# Patient Record
Sex: FEMALE | Race: WHITE | Employment: UNEMPLOYED | RURAL
[De-identification: names, ages, dates, MRNs, and addresses within clinical notes are randomized per-mention and may not be internally consistent; named-entity substitution may affect disease eponyms.]

---

## 2021-08-04 ENCOUNTER — APPOINTMENT (OUTPATIENT)
Dept: CT IMAGING | Age: 31
End: 2021-08-04
Attending: EMERGENCY MEDICINE
Payer: COMMERCIAL

## 2021-08-04 ENCOUNTER — HOSPITAL ENCOUNTER (EMERGENCY)
Age: 31
Discharge: HOME | End: 2021-08-04
Attending: EMERGENCY MEDICINE
Payer: COMMERCIAL

## 2021-08-04 VITALS
BODY MASS INDEX: 48.15 KG/M2 | WEIGHT: 255 LBS | RESPIRATION RATE: 18 BRPM | SYSTOLIC BLOOD PRESSURE: 134 MMHG | HEIGHT: 61 IN | HEART RATE: 100 BPM | OXYGEN SATURATION: 99 % | DIASTOLIC BLOOD PRESSURE: 87 MMHG | TEMPERATURE: 99.4 F

## 2021-08-04 DIAGNOSIS — K62.5 RECTAL BLEEDING: Primary | ICD-10-CM

## 2021-08-04 LAB
ALBUMIN SERPL-MCNC: 3.5 G/DL (ref 3.5–5)
ALBUMIN/GLOB SERPL: 0.9 {RATIO} (ref 1.1–2.2)
ALP SERPL-CCNC: 115 U/L (ref 45–117)
ALT SERPL-CCNC: 43 U/L (ref 12–78)
ANION GAP SERPL CALC-SCNC: 11 MMOL/L (ref 5–15)
AST SERPL-CCNC: 23 U/L (ref 15–37)
BASOPHILS # BLD: 0 K/UL (ref 0–0.1)
BASOPHILS NFR BLD: 0 % (ref 0–1)
BILIRUB SERPL-MCNC: 0.3 MG/DL (ref 0.2–1)
BUN SERPL-MCNC: 10 MG/DL (ref 6–20)
BUN/CREAT SERPL: 11 (ref 12–20)
CALCIUM SERPL-MCNC: 9.1 MG/DL (ref 8.5–10.1)
CHLORIDE SERPL-SCNC: 103 MMOL/L (ref 97–108)
CO2 SERPL-SCNC: 26 MMOL/L (ref 21–32)
COMMENT, HOLDF: NORMAL
CREAT SERPL-MCNC: 0.92 MG/DL (ref 0.55–1.02)
DIFFERENTIAL METHOD BLD: ABNORMAL
EOSINOPHIL # BLD: 0.2 K/UL (ref 0–0.4)
EOSINOPHIL NFR BLD: 2 % (ref 0–7)
ERYTHROCYTE [DISTWIDTH] IN BLOOD BY AUTOMATED COUNT: 17.1 % (ref 11.5–14.5)
GLOBULIN SER CALC-MCNC: 3.9 G/DL (ref 2–4)
GLUCOSE SERPL-MCNC: 134 MG/DL (ref 65–100)
HCT VFR BLD AUTO: 38.5 % (ref 35–47)
HEMOCCULT STL QL: POSITIVE
HGB BLD-MCNC: 12.4 G/DL (ref 11.5–16)
IMM GRANULOCYTES # BLD AUTO: 0 K/UL (ref 0–0.04)
IMM GRANULOCYTES NFR BLD AUTO: 0 % (ref 0–0.5)
LYMPHOCYTES # BLD: 1.6 K/UL (ref 0.8–3.5)
LYMPHOCYTES NFR BLD: 17 % (ref 12–49)
MCH RBC QN AUTO: 26.9 PG (ref 26–34)
MCHC RBC AUTO-ENTMCNC: 32.2 G/DL (ref 30–36.5)
MCV RBC AUTO: 83.5 FL (ref 80–99)
MONOCYTES # BLD: 0.7 K/UL (ref 0–1)
MONOCYTES NFR BLD: 7 % (ref 5–13)
NEUTS SEG # BLD: 7.2 K/UL (ref 1.8–8)
NEUTS SEG NFR BLD: 74 % (ref 32–75)
NRBC # BLD: 0 K/UL (ref 0–0.01)
NRBC BLD-RTO: 0 PER 100 WBC
PLATELET # BLD AUTO: 249 K/UL (ref 150–400)
PMV BLD AUTO: 9.3 FL (ref 8.9–12.9)
POTASSIUM SERPL-SCNC: 3.8 MMOL/L (ref 3.5–5.1)
PROT SERPL-MCNC: 7.4 G/DL (ref 6.4–8.2)
RBC # BLD AUTO: 4.61 M/UL (ref 3.8–5.2)
SAMPLES BEING HELD,HOLD: NORMAL
SODIUM SERPL-SCNC: 140 MMOL/L (ref 136–145)
WBC # BLD AUTO: 9.7 K/UL (ref 3.6–11)

## 2021-08-04 PROCEDURE — 74176 CT ABD & PELVIS W/O CONTRAST: CPT

## 2021-08-04 PROCEDURE — 80053 COMPREHEN METABOLIC PANEL: CPT

## 2021-08-04 PROCEDURE — 99282 EMERGENCY DEPT VISIT SF MDM: CPT

## 2021-08-04 PROCEDURE — 85025 COMPLETE CBC W/AUTO DIFF WBC: CPT

## 2021-08-04 PROCEDURE — 82272 OCCULT BLD FECES 1-3 TESTS: CPT

## 2021-08-04 PROCEDURE — 36415 COLL VENOUS BLD VENIPUNCTURE: CPT

## 2021-08-04 RX ORDER — DOCUSATE SODIUM 100 MG/1
100 CAPSULE, LIQUID FILLED ORAL 2 TIMES DAILY
Qty: 60 CAPSULE | Refills: 2 | Status: SHIPPED | OUTPATIENT
Start: 2021-08-04 | End: 2021-11-02

## 2021-08-04 NOTE — ED TRIAGE NOTES
Patient presents to the ED with a complaint of rectal bleeding that is intermittent. Per the patient she started off with bright red blood now it's dark. Complains of abd pain one week ago, none now.

## 2021-08-04 NOTE — ED PROVIDER NOTES
EMERGENCY DEPARTMENT HISTORY AND PHYSICAL EXAM      Date: 8/4/2021  Patient Name: Sara Carlton    History of Presenting Illness     Chief Complaint   Patient presents with    Rectal Bleeding       History Provided By: Patient    HPI: Sara Carlton, 26 y.o. female with PMHx significant for ADHD, presents ambulatory to the ED with cc of rectal bleeding. Patient reports last week on Tuesday she had a bowel movement with some bright red blood in the toilet bowl afterwards. Directly before this bowel movement she had a brief episode of upper abdominal pain that only lasted a few seconds. It was an isolated event. She had no further bleeding. Last evening and again this morning she had 2 more bowel movements with bright red blood in the toilet although it was slightly darker than before. She denies any abdominal pain. She denies any nausea, vomiting. She denies any fevers or chills. She denies any family history of Crohn's or ulcerative colitis. She recently delivered a healthy baby 8 weeks ago and has not resumed her normal periods. She is currently breast-feeding. She is not on any NSAIDs or anticoagulants. PMHx: Significant for kidney stones  PSHx: Significant for lithotripsy  Social Hx: Non-smoker. There are no other complaints, changes, or physical findings at this time. PCP: Ralph, MD Brie    No current facility-administered medications on file prior to encounter. No current outpatient medications on file prior to encounter. Past History     Past Medical History:  History reviewed. No pertinent past medical history. Past Surgical History:  No past surgical history on file. Family History:  History reviewed. No pertinent family history.     Social History:  Social History     Tobacco Use    Smoking status: Not on file   Substance Use Topics    Alcohol use: Not on file    Drug use: Not on file       Allergies:  No Known Allergies      Review of Systems   Review of Systems   Constitutional: Negative for activity change, chills and fever. HENT: Negative for congestion and sore throat. Eyes: Negative for pain and redness. Respiratory: Negative for cough, chest tightness and shortness of breath. Cardiovascular: Negative for chest pain and palpitations. Gastrointestinal: Positive for anal bleeding and blood in stool. Negative for abdominal pain, constipation, diarrhea, nausea, rectal pain and vomiting. Genitourinary: Negative for dysuria, frequency and urgency. Musculoskeletal: Negative for back pain and neck pain. Skin: Negative for rash. Neurological: Negative for syncope, light-headedness and headaches. Psychiatric/Behavioral: Negative for confusion. All other systems reviewed and are negative. Physical Exam   Physical Exam  Vitals and nursing note reviewed. Constitutional:       General: She is not in acute distress. Appearance: She is well-developed. She is not diaphoretic. HENT:      Head: Normocephalic. Nose: Nose normal.      Mouth/Throat:      Pharynx: No oropharyngeal exudate. Eyes:      General: No scleral icterus. Conjunctiva/sclera: Conjunctivae normal.      Pupils: Pupils are equal, round, and reactive to light. Neck:      Thyroid: No thyromegaly. Vascular: No JVD. Trachea: No tracheal deviation. Cardiovascular:      Rate and Rhythm: Normal rate and regular rhythm. Heart sounds: No murmur heard. No friction rub. No gallop. Pulmonary:      Effort: Pulmonary effort is normal. No respiratory distress. Breath sounds: Normal breath sounds. No stridor. No wheezing or rales. Abdominal:      General: Bowel sounds are normal. There is no distension. Palpations: Abdomen is soft. Tenderness: There is no abdominal tenderness. There is no guarding or rebound. Genitourinary:     Comments: Rectal exam: External exam with 2 small nonthrombosed hemorrhoids at the 12 o'clock position.   Light brown stool. Trace bright red blood on FAIZAN  Musculoskeletal:         General: Normal range of motion. Cervical back: Normal range of motion and neck supple. Lymphadenopathy:      Cervical: No cervical adenopathy. Skin:     General: Skin is warm and dry. Findings: No erythema or rash. Neurological:      Mental Status: She is alert and oriented to person, place, and time. Cranial Nerves: No cranial nerve deficit. Motor: No abnormal muscle tone. Coordination: Coordination normal.   Psychiatric:         Behavior: Behavior normal.             Diagnostic Study Results     Labs -     Recent Results (from the past 12 hour(s))   CBC WITH AUTOMATED DIFF    Collection Time: 08/04/21 12:45 PM   Result Value Ref Range    WBC 9.7 3.6 - 11.0 K/uL    RBC 4.61 3.80 - 5.20 M/uL    HGB 12.4 11.5 - 16.0 g/dL    HCT 38.5 35.0 - 47.0 %    MCV 83.5 80.0 - 99.0 FL    MCH 26.9 26.0 - 34.0 PG    MCHC 32.2 30.0 - 36.5 g/dL    RDW 17.1 (H) 11.5 - 14.5 %    PLATELET 702 378 - 119 K/uL    MPV 9.3 8.9 - 12.9 FL    NRBC 0.0 0  WBC    ABSOLUTE NRBC 0.00 0.00 - 0.01 K/uL    NEUTROPHILS 74 32 - 75 %    LYMPHOCYTES 17 12 - 49 %    MONOCYTES 7 5 - 13 %    EOSINOPHILS 2 0 - 7 %    BASOPHILS 0 0 - 1 %    IMMATURE GRANULOCYTES 0 0.0 - 0.5 %    ABS. NEUTROPHILS 7.2 1.8 - 8.0 K/UL    ABS. LYMPHOCYTES 1.6 0.8 - 3.5 K/UL    ABS. MONOCYTES 0.7 0.0 - 1.0 K/UL    ABS. EOSINOPHILS 0.2 0.0 - 0.4 K/UL    ABS. BASOPHILS 0.0 0.0 - 0.1 K/UL    ABS. IMM.  GRANS. 0.0 0.00 - 0.04 K/UL    DF AUTOMATED     METABOLIC PANEL, COMPREHENSIVE    Collection Time: 08/04/21 12:45 PM   Result Value Ref Range    Sodium 140 136 - 145 mmol/L    Potassium 3.8 3.5 - 5.1 mmol/L    Chloride 103 97 - 108 mmol/L    CO2 26 21 - 32 mmol/L    Anion gap 11 5 - 15 mmol/L    Glucose 134 (H) 65 - 100 mg/dL    BUN 10 6 - 20 MG/DL    Creatinine 0.92 0.55 - 1.02 MG/DL    BUN/Creatinine ratio 11 (L) 12 - 20      GFR est AA >60 >60 ml/min/1.73m2    GFR est non-AA >60 >60 ml/min/1.73m2    Calcium 9.1 8.5 - 10.1 MG/DL    Bilirubin, total 0.3 0.2 - 1.0 MG/DL    ALT (SGPT) 43 12 - 78 U/L    AST (SGOT) 23 15 - 37 U/L    Alk. phosphatase 115 45 - 117 U/L    Protein, total 7.4 6.4 - 8.2 g/dL    Albumin 3.5 3.5 - 5.0 g/dL    Globulin 3.9 2.0 - 4.0 g/dL    A-G Ratio 0.9 (L) 1.1 - 2.2     SAMPLES BEING HELD    Collection Time: 08/04/21 12:45 PM   Result Value Ref Range    SAMPLES BEING HELD 1 SST     COMMENT        Add-on orders for these samples will be processed based on acceptable specimen integrity and analyte stability, which may vary by analyte. OCCULT BLOOD, STOOL    Collection Time: 08/04/21  1:04 PM   Result Value Ref Range    Occult blood, stool Positive (A) NEG         Radiologic Studies -   CT ABD PELV WO CONT   Final Result   1. No acute process. 2. Moderate hepatic steatosis. 3. Nonobstructing renal calculi. 4. Moderate, bilateral, sacroiliac osteoarthritis; advanced for age. CT Results  (Last 48 hours)               08/04/21 1338  CT ABD PELV WO CONT Final result    Impression:  1. No acute process. 2. Moderate hepatic steatosis. 3. Nonobstructing renal calculi. 4. Moderate, bilateral, sacroiliac osteoarthritis; advanced for age. Narrative:  CT ABDOMEN AND PELVIS WITHOUT CONTRAST. 8/4/2021 1:38 PM        INDICATION: Abdominal pain, gastrointestinal hemorrhage. COMPARISON: None. TECHNIQUE: CT of the abdomen and pelvis was performed without contrast. CT dose   reduction was achieved through use of a standardized protocol tailored for this   examination and automatic exposure control for dose modulation. FINDINGS: The study is limited by patient body habitus: redundant soft tissue   causes photon starvation, and the flank(s) touching the gantry cause(s)   truncation artifact. Abdomen: Hepatic steatosis is moderate, with focal fatty sparing around the   gallbladder fossa. Bilateral renal calculi are nonobstructing.  Lung bases are   clear. The heart size is normal. The distal esophagus, stomach, duodenum,   gallbladder, pancreas, spleen, and adrenals are normal.       Pelvis: The unenhanced small bowel, ileocecal junction, appendix, colon, and   bladder are normal. No free air or fluid, and no abdominopelvic lymphadenopathy. Bilateral sacroiliac osteoarthritis is moderate, and advanced for age. CXR Results  (Last 48 hours)    None            Medical Decision Making   I am the first provider for this patient. I reviewed the vital signs, available nursing notes, past medical history, past surgical history, family history and social history. Vital Signs-Reviewed the patient's vital signs. Patient Vitals for the past 12 hrs:   Temp Pulse Resp BP SpO2   08/04/21 1200 99.4 °F (37.4 °C) 100 18 134/87 99 %           Records Reviewed: Nursing notes reviewed    Provider Notes (Medical Decision Making):   DDX: Hemorrhoidal bleeding, internal hemorrhoid, GI bleed. Acute blood loss anemia. ED Course:   Initial assessment performed. The patients presenting problems have been discussed, and they are in agreement with the care plan formulated and outlined with them. I have encouraged them to ask questions as they arise throughout their visit. PROGRESS NOTE    Pt reevaluated. Start hemorrhoidal bleeding. Globin normal at 12.4. BMP grossly unremarkable. Normal BUN and creatinine. CT abdomen pelvis without acute findings. Incidental findings of hepatic steatosis, bilateral nonobstructing kidney stones and SI joint arthritis. Reviewed all these findings with patient. Recommended GI follow-up for possible colonoscopy. Patient agreed to return if any continued GI bleeding. Recommended daily Colace. Written by Emily Cotton MD     Progress note:    Pt noted to be feeling better and ready for discharge. Updated pt and/or family on all final lab and/or  imaging findings. Will follow up as instructed.  All questions have been answered, pt voiced understanding and agreement with plan. Specific return precautions provided as well as instructions to return to the ED should sx worsen at any time. Vital signs stable for discharge. I have also put together some discharge instructions for them that include: 1) educational information regarding their diagnosis, 2) how to care for their diagnosis at home, as well a 3) list of reasons why they would want to return to the ED prior to their follow-up appointment, should their condition change. Written by Alcides Childress MD        Critical Care Time:   0    Disposition:  Discharge    PLAN:  1. There are no discharge medications for this patient. 2.   Follow-up Information     Follow up With Specialties Details Why [address] [place] [address] [facility]  Schedule an appointment as soon as possible for a visit in 1 week  [facility] [address]  [facility] [address]    [address] [place] [address] Emergency Medicine Go in 1 day If symptoms worsen [address] [id number] [phone]        Return to ED if worse     Diagnosis     Clinical Impression:   1. Rectal bleeding              Please note that this dictation was completed with [employer], the computer voice recognition software. Quite often unanticipated grammatical, syntax, homophones, and other interpretive errors are inadvertently transcribed by the computer software. Please disregard these errors. Please excuse any errors that have escaped final proofreading.

## 2022-05-04 ENCOUNTER — APPOINTMENT (OUTPATIENT)
Dept: GENERAL RADIOLOGY | Age: 32
DRG: 720 | End: 2022-05-04
Attending: EMERGENCY MEDICINE
Payer: MEDICAID

## 2022-05-04 ENCOUNTER — ANESTHESIA EVENT (OUTPATIENT)
Dept: SURGERY | Age: 32
DRG: 720 | End: 2022-05-04
Payer: MEDICAID

## 2022-05-04 ENCOUNTER — APPOINTMENT (OUTPATIENT)
Dept: CT IMAGING | Age: 32
End: 2022-05-04
Attending: EMERGENCY MEDICINE
Payer: COMMERCIAL

## 2022-05-04 ENCOUNTER — HOSPITAL ENCOUNTER (EMERGENCY)
Age: 32
Discharge: HOME | End: 2022-05-04
Attending: EMERGENCY MEDICINE
Payer: COMMERCIAL

## 2022-05-04 ENCOUNTER — HOSPITAL ENCOUNTER (INPATIENT)
Age: 32
LOS: 7 days | Discharge: HOME | DRG: 720 | End: 2022-05-11
Attending: EMERGENCY MEDICINE | Admitting: STUDENT IN AN ORGANIZED HEALTH CARE EDUCATION/TRAINING PROGRAM
Payer: MEDICAID

## 2022-05-04 ENCOUNTER — ANESTHESIA (OUTPATIENT)
Dept: SURGERY | Age: 32
DRG: 720 | End: 2022-05-04
Payer: MEDICAID

## 2022-05-04 VITALS
HEART RATE: 70 BPM | SYSTOLIC BLOOD PRESSURE: 122 MMHG | WEIGHT: 255 LBS | HEIGHT: 61 IN | DIASTOLIC BLOOD PRESSURE: 64 MMHG | RESPIRATION RATE: 18 BRPM | BODY MASS INDEX: 48.15 KG/M2 | OXYGEN SATURATION: 99 % | TEMPERATURE: 97.5 F

## 2022-05-04 DIAGNOSIS — E66.01 MORBID OBESITY WITH BMI OF 45.0-49.9, ADULT (HCC): ICD-10-CM

## 2022-05-04 DIAGNOSIS — R65.21 SEPTIC SHOCK (HCC): ICD-10-CM

## 2022-05-04 DIAGNOSIS — A41.9 SEVERE SEPSIS (HCC): ICD-10-CM

## 2022-05-04 DIAGNOSIS — N20.1 URETERAL CALCULUS, LEFT: Primary | ICD-10-CM

## 2022-05-04 DIAGNOSIS — S40.262A TICK BITE OF LEFT SHOULDER, INITIAL ENCOUNTER: ICD-10-CM

## 2022-05-04 DIAGNOSIS — N20.1 URETERIC CALCULUS: ICD-10-CM

## 2022-05-04 DIAGNOSIS — R50.9 PERSISTENT FEVER: ICD-10-CM

## 2022-05-04 DIAGNOSIS — R65.20 SEVERE SEPSIS (HCC): ICD-10-CM

## 2022-05-04 DIAGNOSIS — N11.1 OBSTRUCTIVE PYELONEPHRITIS: ICD-10-CM

## 2022-05-04 DIAGNOSIS — N20.2 CALCULUS OF KIDNEY WITH CALCULUS OF URETER: ICD-10-CM

## 2022-05-04 DIAGNOSIS — R10.9 FLANK PAIN: ICD-10-CM
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
## 2022-05-04 DIAGNOSIS — A41.9 SEPTIC SHOCK (HCC): ICD-10-CM

## 2022-05-04 DIAGNOSIS — N13.30 HYDRONEPHROSIS OF LEFT KIDNEY: ICD-10-CM

## 2022-05-04 DIAGNOSIS — N20.0 BILATERAL NEPHROLITHIASIS: Primary | ICD-10-CM

## 2022-05-04 PROBLEM — N20.9 UROLITHIASIS: Status: ACTIVE | Noted: 2022-05-04

## 2022-05-04 PROBLEM — N39.0 UTI (URINARY TRACT INFECTION): Status: ACTIVE | Noted: 2022-05-04

## 2022-05-04 LAB
ALBUMIN SERPL-MCNC: 3.5 G/DL (ref 3.5–5)
ALBUMIN SERPL-MCNC: 3.7 G/DL (ref 3.5–5)
ALBUMIN/GLOB SERPL: 0.9 {RATIO} (ref 1.1–2.2)
ALBUMIN/GLOB SERPL: 0.9 {RATIO} (ref 1.1–2.2)
ALP SERPL-CCNC: 112 U/L (ref 45–117)
ALP SERPL-CCNC: 119 U/L (ref 45–117)
ALT SERPL-CCNC: 53 U/L (ref 12–78)
ALT SERPL-CCNC: 57 U/L (ref 12–78)
ANION GAP SERPL CALC-SCNC: 13 MMOL/L (ref 5–15)
ANION GAP SERPL CALC-SCNC: 8 MMOL/L (ref 5–15)
APPEARANCE UR: ABNORMAL
AST SERPL-CCNC: 28 U/L (ref 15–37)
AST SERPL-CCNC: 33 U/L (ref 15–37)
BACTERIA URNS QL MICRO: ABNORMAL /HPF
BASOPHILS # BLD: 0 K/UL (ref 0–0.1)
BASOPHILS # BLD: 0.1 K/UL (ref 0–0.1)
BASOPHILS NFR BLD: 0 % (ref 0–1)
BASOPHILS NFR BLD: 0 % (ref 0–1)
BILIRUB SERPL-MCNC: 0.2 MG/DL (ref 0.2–1)
BILIRUB SERPL-MCNC: 0.7 MG/DL (ref 0.2–1)
BILIRUB UR QL: NEGATIVE
BUN SERPL-MCNC: 14 MG/DL (ref 6–20)
BUN SERPL-MCNC: 15 MG/DL (ref 6–20)
BUN/CREAT SERPL: 12 (ref 12–20)
BUN/CREAT SERPL: 14 (ref 12–20)
CALCIUM SERPL-MCNC: 8 MG/DL (ref 8.5–10.1)
CALCIUM SERPL-MCNC: 8.3 MG/DL (ref 8.5–10.1)
CHLORIDE SERPL-SCNC: 104 MMOL/L (ref 97–108)
CHLORIDE SERPL-SCNC: 105 MMOL/L (ref 97–108)
CO2 SERPL-SCNC: 21 MMOL/L (ref 21–32)
CO2 SERPL-SCNC: 23 MMOL/L (ref 21–32)
COLOR UR: ABNORMAL
COMMENT, HOLDF: NORMAL
COVID-19 RAPID TEST, COVR: NOT DETECTED
CREAT SERPL-MCNC: 0.98 MG/DL (ref 0.55–1.02)
CREAT SERPL-MCNC: 1.28 MG/DL (ref 0.55–1.02)
DIFFERENTIAL METHOD BLD: ABNORMAL
DIFFERENTIAL METHOD BLD: ABNORMAL
EOSINOPHIL # BLD: 0 K/UL (ref 0–0.4)
EOSINOPHIL # BLD: 0.2 K/UL (ref 0–0.4)
EOSINOPHIL NFR BLD: 0 % (ref 0–7)
EOSINOPHIL NFR BLD: 1 % (ref 0–7)
EPITH CASTS URNS QL MICRO: ABNORMAL /LPF
ERYTHROCYTE [DISTWIDTH] IN BLOOD BY AUTOMATED COUNT: 14.6 % (ref 11.5–14.5)
ERYTHROCYTE [DISTWIDTH] IN BLOOD BY AUTOMATED COUNT: 15 % (ref 11.5–14.5)
FLUAV AG NPH QL IA: NEGATIVE
FLUBV AG NOSE QL IA: NEGATIVE
GLOBULIN SER CALC-MCNC: 3.7 G/DL (ref 2–4)
GLOBULIN SER CALC-MCNC: 4.1 G/DL (ref 2–4)
GLUCOSE SERPL-MCNC: 122 MG/DL (ref 65–100)
GLUCOSE SERPL-MCNC: 95 MG/DL (ref 65–100)
GLUCOSE UR STRIP.AUTO-MCNC: NEGATIVE MG/DL
HCG UR QL: NEGATIVE
HCT VFR BLD AUTO: 36.6 % (ref 35–47)
HCT VFR BLD AUTO: 41.3 % (ref 35–47)
HGB BLD-MCNC: 11.8 G/DL (ref 11.5–16)
HGB BLD-MCNC: 13.7 G/DL (ref 11.5–16)
HGB UR QL STRIP: ABNORMAL
IMM GRANULOCYTES # BLD AUTO: 0 K/UL (ref 0–0.04)
IMM GRANULOCYTES # BLD AUTO: 0 K/UL (ref 0–0.04)
IMM GRANULOCYTES NFR BLD AUTO: 0 % (ref 0–0.5)
IMM GRANULOCYTES NFR BLD AUTO: 0 % (ref 0–0.5)
KETONES UR QL STRIP.AUTO: NEGATIVE MG/DL
LACTATE BLD-SCNC: 3.48 MMOL/L (ref 0.4–2)
LACTATE BLD-SCNC: 4.08 MMOL/L (ref 0.4–2)
LACTATE SERPL-SCNC: 2.5 MMOL/L (ref 0.4–2)
LEUKOCYTE ESTERASE UR QL STRIP.AUTO: NEGATIVE
LIPASE SERPL-CCNC: 111 U/L (ref 73–393)
LYMPHOCYTES # BLD: 0.3 K/UL (ref 0.8–3.5)
LYMPHOCYTES # BLD: 2.6 K/UL (ref 0.8–3.5)
LYMPHOCYTES NFR BLD: 2 % (ref 12–49)
LYMPHOCYTES NFR BLD: 21 % (ref 12–49)
MCH RBC QN AUTO: 28.1 PG (ref 26–34)
MCH RBC QN AUTO: 28.3 PG (ref 26–34)
MCHC RBC AUTO-ENTMCNC: 32.2 G/DL (ref 30–36.5)
MCHC RBC AUTO-ENTMCNC: 33.2 G/DL (ref 30–36.5)
MCV RBC AUTO: 84.8 FL (ref 80–99)
MCV RBC AUTO: 87.8 FL (ref 80–99)
MONOCYTES # BLD: 0 K/UL (ref 0–1)
MONOCYTES # BLD: 1 K/UL (ref 0–1)
MONOCYTES NFR BLD: 0 % (ref 5–13)
MONOCYTES NFR BLD: 8 % (ref 5–13)
NEUTS BAND NFR BLD MANUAL: 19 %
NEUTS SEG # BLD: 16.3 K/UL (ref 1.8–8)
NEUTS SEG # BLD: 8.5 K/UL (ref 1.8–8)
NEUTS SEG NFR BLD: 70 % (ref 32–75)
NEUTS SEG NFR BLD: 79 % (ref 32–75)
NITRITE UR QL STRIP.AUTO: POSITIVE
NRBC # BLD: 0 K/UL (ref 0–0.01)
NRBC # BLD: 0 K/UL (ref 0–0.01)
NRBC BLD-RTO: 0 PER 100 WBC
NRBC BLD-RTO: 0 PER 100 WBC
PH UR STRIP: 6 [PH] (ref 5–8)
PLATELET # BLD AUTO: 186 K/UL (ref 150–400)
PLATELET # BLD AUTO: 290 K/UL (ref 150–400)
PLATELET COMMENTS,PCOM: ABNORMAL
PMV BLD AUTO: 10.2 FL (ref 8.9–12.9)
PMV BLD AUTO: 9.8 FL (ref 8.9–12.9)
POTASSIUM SERPL-SCNC: 3.9 MMOL/L (ref 3.5–5.1)
POTASSIUM SERPL-SCNC: 4 MMOL/L (ref 3.5–5.1)
PROT SERPL-MCNC: 7.2 G/DL (ref 6.4–8.2)
PROT SERPL-MCNC: 7.8 G/DL (ref 6.4–8.2)
PROT UR STRIP-MCNC: 30 MG/DL
RBC # BLD AUTO: 4.17 M/UL (ref 3.8–5.2)
RBC # BLD AUTO: 4.87 M/UL (ref 3.8–5.2)
RBC #/AREA URNS HPF: ABNORMAL /HPF (ref 0–5)
RBC MORPH BLD: ABNORMAL
SAMPLES BEING HELD,HOLD: NORMAL
SODIUM SERPL-SCNC: 135 MMOL/L (ref 136–145)
SODIUM SERPL-SCNC: 139 MMOL/L (ref 136–145)
SOURCE, COVRS: NORMAL
SP GR UR REFRACTOMETRY: 1.02 (ref 1–1.03)
TROPONIN-HIGH SENSITIVITY: <4 NG/L (ref 0–51)
UROBILINOGEN UR QL STRIP.AUTO: 0.2 EU/DL (ref 0.2–1)
WBC # BLD AUTO: 12.4 K/UL (ref 3.6–11)
WBC # BLD AUTO: 16.6 K/UL (ref 3.6–11)
WBC MORPH BLD: ABNORMAL
WBC URNS QL MICRO: ABNORMAL /HPF (ref 0–4)

## 2022-05-04 PROCEDURE — 80053 COMPREHEN METABOLIC PANEL: CPT

## 2022-05-04 PROCEDURE — 74011250636 HC RX REV CODE- 250/636: Performed by: NURSE ANESTHETIST, CERTIFIED REGISTERED

## 2022-05-04 PROCEDURE — 87086 URINE CULTURE/COLONY COUNT: CPT

## 2022-05-04 PROCEDURE — 74011000250 HC RX REV CODE- 250: Performed by: NURSE ANESTHETIST, CERTIFIED REGISTERED

## 2022-05-04 PROCEDURE — 99285 EMERGENCY DEPT VISIT HI MDM: CPT

## 2022-05-04 PROCEDURE — 87186 SC STD MICRODIL/AGAR DIL: CPT

## 2022-05-04 PROCEDURE — 83605 ASSAY OF LACTIC ACID: CPT

## 2022-05-04 PROCEDURE — 96374 THER/PROPH/DIAG INJ IV PUSH: CPT

## 2022-05-04 PROCEDURE — 36415 COLL VENOUS BLD VENIPUNCTURE: CPT

## 2022-05-04 PROCEDURE — 74011250636 HC RX REV CODE- 250/636: Performed by: EMERGENCY MEDICINE

## 2022-05-04 PROCEDURE — 87077 CULTURE AEROBIC IDENTIFY: CPT

## 2022-05-04 PROCEDURE — 74420 UROGRAPHY RTRGR +-KUB: CPT

## 2022-05-04 PROCEDURE — 87804 INFLUENZA ASSAY W/OPTIC: CPT

## 2022-05-04 PROCEDURE — 74011250637 HC RX REV CODE- 250/637: Performed by: EMERGENCY MEDICINE

## 2022-05-04 PROCEDURE — 93005 ELECTROCARDIOGRAM TRACING: CPT

## 2022-05-04 PROCEDURE — 74011000272 HC RX REV CODE- 272: Performed by: UROLOGY

## 2022-05-04 PROCEDURE — 87635 SARS-COV-2 COVID-19 AMP PRB: CPT

## 2022-05-04 PROCEDURE — 96375 TX/PRO/DX INJ NEW DRUG ADDON: CPT

## 2022-05-04 PROCEDURE — 74011000250 HC RX REV CODE- 250: Performed by: STUDENT IN AN ORGANIZED HEALTH CARE EDUCATION/TRAINING PROGRAM

## 2022-05-04 PROCEDURE — 81025 URINE PREGNANCY TEST: CPT

## 2022-05-04 PROCEDURE — 76010000138 HC OR TIME 0.5 TO 1 HR: Performed by: UROLOGY

## 2022-05-04 PROCEDURE — C2617 STENT, NON-COR, TEM W/O DEL: HCPCS | Performed by: UROLOGY

## 2022-05-04 PROCEDURE — 87040 BLOOD CULTURE FOR BACTERIA: CPT

## 2022-05-04 PROCEDURE — 74011000258 HC RX REV CODE- 258: Performed by: EMERGENCY MEDICINE

## 2022-05-04 PROCEDURE — 0T778DZ DILATION OF LEFT URETER WITH INTRALUMINAL DEVICE, VIA NATURAL OR ARTIFICIAL OPENING ENDOSCOPIC: ICD-10-PCS | Performed by: UROLOGY

## 2022-05-04 PROCEDURE — 96376 TX/PRO/DX INJ SAME DRUG ADON: CPT

## 2022-05-04 PROCEDURE — 81001 URINALYSIS AUTO W/SCOPE: CPT

## 2022-05-04 PROCEDURE — 71045 X-RAY EXAM CHEST 1 VIEW: CPT

## 2022-05-04 PROCEDURE — 74011250636 HC RX REV CODE- 250/636: Performed by: ANESTHESIOLOGY

## 2022-05-04 PROCEDURE — 76210000006 HC OR PH I REC 0.5 TO 1 HR: Performed by: UROLOGY

## 2022-05-04 PROCEDURE — 65270000046 HC RM TELEMETRY

## 2022-05-04 PROCEDURE — 83690 ASSAY OF LIPASE: CPT

## 2022-05-04 PROCEDURE — 74011250636 HC RX REV CODE- 250/636: Performed by: STUDENT IN AN ORGANIZED HEALTH CARE EDUCATION/TRAINING PROGRAM

## 2022-05-04 PROCEDURE — 74176 CT ABD & PELVIS W/O CONTRAST: CPT

## 2022-05-04 PROCEDURE — 85025 COMPLETE CBC W/AUTO DIFF WBC: CPT

## 2022-05-04 PROCEDURE — 77030010509 HC AIRWY LMA MSK TELE -A: Performed by: ANESTHESIOLOGY

## 2022-05-04 PROCEDURE — 84484 ASSAY OF TROPONIN QUANT: CPT

## 2022-05-04 PROCEDURE — 2709999900 HC NON-CHARGEABLE SUPPLY: Performed by: UROLOGY

## 2022-05-04 PROCEDURE — 77030012961 HC IRR KT CYSTO/TUR ICUM -A: Performed by: UROLOGY

## 2022-05-04 PROCEDURE — 76060000032 HC ANESTHESIA 0.5 TO 1 HR: Performed by: UROLOGY

## 2022-05-04 DEVICE — URETERAL STENT
Type: IMPLANTABLE DEVICE | Site: URETER | Status: FUNCTIONAL
Brand: POLARIS™ ULTRA

## 2022-05-04 RX ORDER — ONDANSETRON 2 MG/ML
INJECTION INTRAMUSCULAR; INTRAVENOUS AS NEEDED
Status: DISCONTINUED | OUTPATIENT
Start: 2022-05-04 | End: 2022-05-04 | Stop reason: HOSPADM

## 2022-05-04 RX ORDER — ONDANSETRON 4 MG/1
4 TABLET, ORALLY DISINTEGRATING ORAL
Qty: 6 TABLET | Refills: 0 | Status: SHIPPED | OUTPATIENT
Start: 2022-05-04

## 2022-05-04 RX ORDER — KETOROLAC TROMETHAMINE 30 MG/ML
30 INJECTION, SOLUTION INTRAMUSCULAR; INTRAVENOUS
Status: COMPLETED | OUTPATIENT
Start: 2022-05-04 | End: 2022-05-04

## 2022-05-04 RX ORDER — FENTANYL CITRATE 50 UG/ML
25 INJECTION, SOLUTION INTRAMUSCULAR; INTRAVENOUS
Status: DISCONTINUED | OUTPATIENT
Start: 2022-05-04 | End: 2022-05-04 | Stop reason: HOSPADM

## 2022-05-04 RX ORDER — ONDANSETRON 2 MG/ML
4 INJECTION INTRAMUSCULAR; INTRAVENOUS AS NEEDED
Status: DISCONTINUED | OUTPATIENT
Start: 2022-05-04 | End: 2022-05-04 | Stop reason: HOSPADM

## 2022-05-04 RX ORDER — SODIUM CHLORIDE 0.9 % (FLUSH) 0.9 %
5-40 SYRINGE (ML) INJECTION EVERY 8 HOURS
Status: DISCONTINUED | OUTPATIENT
Start: 2022-05-04 | End: 2022-05-11 | Stop reason: HOSPADM

## 2022-05-04 RX ORDER — HYDROMORPHONE HYDROCHLORIDE 1 MG/ML
1 INJECTION, SOLUTION INTRAMUSCULAR; INTRAVENOUS; SUBCUTANEOUS
Status: COMPLETED | OUTPATIENT
Start: 2022-05-04 | End: 2022-05-04

## 2022-05-04 RX ORDER — POLYETHYLENE GLYCOL 3350 17 G/17G
17 POWDER, FOR SOLUTION ORAL DAILY PRN
Status: DISCONTINUED | OUTPATIENT
Start: 2022-05-04 | End: 2022-05-11 | Stop reason: HOSPADM

## 2022-05-04 RX ORDER — ONDANSETRON 2 MG/ML
8 INJECTION INTRAMUSCULAR; INTRAVENOUS ONCE
Status: COMPLETED | OUTPATIENT
Start: 2022-05-04 | End: 2022-05-04

## 2022-05-04 RX ORDER — HYDROCODONE BITARTRATE AND ACETAMINOPHEN 5; 325 MG/1; MG/1
1 TABLET ORAL
Status: COMPLETED | OUTPATIENT
Start: 2022-05-04 | End: 2022-05-04

## 2022-05-04 RX ORDER — SODIUM CHLORIDE 0.9 % (FLUSH) 0.9 %
5-40 SYRINGE (ML) INJECTION EVERY 8 HOURS
Status: DISCONTINUED | OUTPATIENT
Start: 2022-05-04 | End: 2022-05-04 | Stop reason: HOSPADM

## 2022-05-04 RX ORDER — TAMSULOSIN HYDROCHLORIDE 0.4 MG/1
0.4 CAPSULE ORAL DAILY
Qty: 7 CAPSULE | Refills: 0 | Status: SHIPPED | OUTPATIENT
Start: 2022-05-04 | End: 2022-05-11

## 2022-05-04 RX ORDER — LIDOCAINE HYDROCHLORIDE 20 MG/ML
INJECTION, SOLUTION EPIDURAL; INFILTRATION; INTRACAUDAL; PERINEURAL AS NEEDED
Status: DISCONTINUED | OUTPATIENT
Start: 2022-05-04 | End: 2022-05-04 | Stop reason: HOSPADM

## 2022-05-04 RX ORDER — SODIUM CHLORIDE 9 MG/ML
75 INJECTION, SOLUTION INTRAVENOUS CONTINUOUS
Status: DISCONTINUED | OUTPATIENT
Start: 2022-05-04 | End: 2022-05-10

## 2022-05-04 RX ORDER — MIDAZOLAM HYDROCHLORIDE 1 MG/ML
0.5 INJECTION, SOLUTION INTRAMUSCULAR; INTRAVENOUS
Status: DISCONTINUED | OUTPATIENT
Start: 2022-05-04 | End: 2022-05-04 | Stop reason: HOSPADM

## 2022-05-04 RX ORDER — HYDROMORPHONE HYDROCHLORIDE 1 MG/ML
.2-.5 INJECTION, SOLUTION INTRAMUSCULAR; INTRAVENOUS; SUBCUTANEOUS
Status: DISCONTINUED | OUTPATIENT
Start: 2022-05-04 | End: 2022-05-04 | Stop reason: HOSPADM

## 2022-05-04 RX ORDER — FENTANYL CITRATE 50 UG/ML
INJECTION, SOLUTION INTRAMUSCULAR; INTRAVENOUS AS NEEDED
Status: DISCONTINUED | OUTPATIENT
Start: 2022-05-04 | End: 2022-05-04 | Stop reason: HOSPADM

## 2022-05-04 RX ORDER — IBUPROFEN 600 MG/1
600 TABLET ORAL
Qty: 20 TABLET | Refills: 0 | Status: SHIPPED | OUTPATIENT
Start: 2022-05-04

## 2022-05-04 RX ORDER — SODIUM CHLORIDE 0.9 % (FLUSH) 0.9 %
5-40 SYRINGE (ML) INJECTION AS NEEDED
Status: DISCONTINUED | OUTPATIENT
Start: 2022-05-04 | End: 2022-05-04 | Stop reason: HOSPADM

## 2022-05-04 RX ORDER — PROPOFOL 10 MG/ML
INJECTION, EMULSION INTRAVENOUS AS NEEDED
Status: DISCONTINUED | OUTPATIENT
Start: 2022-05-04 | End: 2022-05-04 | Stop reason: HOSPADM

## 2022-05-04 RX ORDER — CEFTRIAXONE 1 G/1
INJECTION, POWDER, FOR SOLUTION INTRAMUSCULAR; INTRAVENOUS
Status: DISPENSED
Start: 2022-05-04 | End: 2022-05-05

## 2022-05-04 RX ORDER — MORPHINE SULFATE 4 MG/ML
8 INJECTION INTRAVENOUS ONCE
Status: COMPLETED | OUTPATIENT
Start: 2022-05-04 | End: 2022-05-04

## 2022-05-04 RX ORDER — PHENYLEPHRINE HCL IN 0.9% NACL 0.4MG/10ML
SYRINGE (ML) INTRAVENOUS AS NEEDED
Status: DISCONTINUED | OUTPATIENT
Start: 2022-05-04 | End: 2022-05-04 | Stop reason: HOSPADM

## 2022-05-04 RX ORDER — HYDROCODONE BITARTRATE AND ACETAMINOPHEN 5; 325 MG/1; MG/1
1 TABLET ORAL
Qty: 10 TABLET | Refills: 0 | Status: SHIPPED | OUTPATIENT
Start: 2022-05-04 | End: 2022-05-11

## 2022-05-04 RX ORDER — MORPHINE SULFATE 2 MG/ML
2 INJECTION, SOLUTION INTRAMUSCULAR; INTRAVENOUS
Status: DISCONTINUED | OUTPATIENT
Start: 2022-05-04 | End: 2022-05-05

## 2022-05-04 RX ORDER — SODIUM CHLORIDE 0.9 % (FLUSH) 0.9 %
5-40 SYRINGE (ML) INJECTION AS NEEDED
Status: DISCONTINUED | OUTPATIENT
Start: 2022-05-04 | End: 2022-05-11 | Stop reason: HOSPADM

## 2022-05-04 RX ORDER — SODIUM CHLORIDE 0.9 % (FLUSH) 0.9 %
5-10 SYRINGE (ML) INJECTION ONCE
Status: DISCONTINUED | OUTPATIENT
Start: 2022-05-04 | End: 2022-05-04 | Stop reason: HOSPADM

## 2022-05-04 RX ORDER — ACETAMINOPHEN 325 MG/1
650 TABLET ORAL
Status: DISCONTINUED | OUTPATIENT
Start: 2022-05-04 | End: 2022-05-10

## 2022-05-04 RX ORDER — HYDROMORPHONE HYDROCHLORIDE 2 MG/ML
INJECTION, SOLUTION INTRAMUSCULAR; INTRAVENOUS; SUBCUTANEOUS AS NEEDED
Status: DISCONTINUED | OUTPATIENT
Start: 2022-05-04 | End: 2022-05-04 | Stop reason: HOSPADM

## 2022-05-04 RX ORDER — ONDANSETRON 2 MG/ML
4 INJECTION INTRAMUSCULAR; INTRAVENOUS
Status: DISCONTINUED | OUTPATIENT
Start: 2022-05-04 | End: 2022-05-11 | Stop reason: HOSPADM

## 2022-05-04 RX ORDER — MIDAZOLAM HYDROCHLORIDE 1 MG/ML
INJECTION, SOLUTION INTRAMUSCULAR; INTRAVENOUS AS NEEDED
Status: DISCONTINUED | OUTPATIENT
Start: 2022-05-04 | End: 2022-05-04 | Stop reason: HOSPADM

## 2022-05-04 RX ORDER — KETOROLAC TROMETHAMINE 30 MG/ML
10 INJECTION, SOLUTION INTRAMUSCULAR; INTRAVENOUS
Status: COMPLETED | OUTPATIENT
Start: 2022-05-04 | End: 2022-05-04

## 2022-05-04 RX ORDER — HYDROMORPHONE HYDROCHLORIDE 1 MG/ML
0.5 INJECTION, SOLUTION INTRAMUSCULAR; INTRAVENOUS; SUBCUTANEOUS
Status: DISCONTINUED | OUTPATIENT
Start: 2022-05-04 | End: 2022-05-04

## 2022-05-04 RX ORDER — MORPHINE SULFATE 2 MG/ML
2 INJECTION, SOLUTION INTRAMUSCULAR; INTRAVENOUS
Status: DISCONTINUED | OUTPATIENT
Start: 2022-05-04 | End: 2022-05-04 | Stop reason: HOSPADM

## 2022-05-04 RX ORDER — LORAZEPAM 2 MG/ML
1 INJECTION INTRAMUSCULAR
Status: COMPLETED | OUTPATIENT
Start: 2022-05-04 | End: 2022-05-04

## 2022-05-04 RX ORDER — FENTANYL CITRATE 50 UG/ML
50 INJECTION, SOLUTION INTRAMUSCULAR; INTRAVENOUS AS NEEDED
Status: DISCONTINUED | OUTPATIENT
Start: 2022-05-04 | End: 2022-05-04 | Stop reason: HOSPADM

## 2022-05-04 RX ORDER — LIDOCAINE HYDROCHLORIDE 10 MG/ML
0.1 INJECTION, SOLUTION EPIDURAL; INFILTRATION; INTRACAUDAL; PERINEURAL AS NEEDED
Status: DISCONTINUED | OUTPATIENT
Start: 2022-05-04 | End: 2022-05-04 | Stop reason: HOSPADM

## 2022-05-04 RX ORDER — DIPHENHYDRAMINE HYDROCHLORIDE 50 MG/ML
12.5 INJECTION, SOLUTION INTRAMUSCULAR; INTRAVENOUS AS NEEDED
Status: DISCONTINUED | OUTPATIENT
Start: 2022-05-04 | End: 2022-05-04 | Stop reason: HOSPADM

## 2022-05-04 RX ORDER — DEXAMETHASONE SODIUM PHOSPHATE 4 MG/ML
INJECTION, SOLUTION INTRA-ARTICULAR; INTRALESIONAL; INTRAMUSCULAR; INTRAVENOUS; SOFT TISSUE AS NEEDED
Status: DISCONTINUED | OUTPATIENT
Start: 2022-05-04 | End: 2022-05-04 | Stop reason: HOSPADM

## 2022-05-04 RX ORDER — ACETAMINOPHEN 650 MG/1
650 SUPPOSITORY RECTAL
Status: DISCONTINUED | OUTPATIENT
Start: 2022-05-04 | End: 2022-05-10

## 2022-05-04 RX ORDER — ONDANSETRON 4 MG/1
4 TABLET, ORALLY DISINTEGRATING ORAL
Status: DISCONTINUED | OUTPATIENT
Start: 2022-05-04 | End: 2022-05-11 | Stop reason: HOSPADM

## 2022-05-04 RX ORDER — ONDANSETRON 2 MG/ML
4 INJECTION INTRAMUSCULAR; INTRAVENOUS
Status: COMPLETED | OUTPATIENT
Start: 2022-05-04 | End: 2022-05-04

## 2022-05-04 RX ORDER — SODIUM CHLORIDE 0.9 % (FLUSH) 0.9 %
5-10 SYRINGE (ML) INJECTION AS NEEDED
Status: DISCONTINUED | OUTPATIENT
Start: 2022-05-04 | End: 2022-05-06 | Stop reason: SDUPTHER

## 2022-05-04 RX ORDER — SODIUM CHLORIDE, SODIUM LACTATE, POTASSIUM CHLORIDE, CALCIUM CHLORIDE 600; 310; 30; 20 MG/100ML; MG/100ML; MG/100ML; MG/100ML
25 INJECTION, SOLUTION INTRAVENOUS CONTINUOUS
Status: DISCONTINUED | OUTPATIENT
Start: 2022-05-04 | End: 2022-05-04 | Stop reason: HOSPADM

## 2022-05-04 RX ORDER — SODIUM CHLORIDE 900 MG/100ML
INJECTION INTRAVENOUS
Status: DISPENSED
Start: 2022-05-04 | End: 2022-05-05

## 2022-05-04 RX ADMIN — MORPHINE SULFATE 8 MG: 4 INJECTION INTRAVENOUS at 11:21

## 2022-05-04 RX ADMIN — KETOROLAC TROMETHAMINE 10 MG: 30 INJECTION, SOLUTION INTRAMUSCULAR at 11:20

## 2022-05-04 RX ADMIN — SODIUM CHLORIDE, PRESERVATIVE FREE 10 ML: 5 INJECTION INTRAVENOUS at 22:38

## 2022-05-04 RX ADMIN — LIDOCAINE HYDROCHLORIDE 100 MG: 20 INJECTION, SOLUTION EPIDURAL; INFILTRATION; INTRACAUDAL; PERINEURAL at 20:24

## 2022-05-04 RX ADMIN — SODIUM CHLORIDE 1000 ML: 9 INJECTION, SOLUTION INTRAVENOUS at 18:12

## 2022-05-04 RX ADMIN — Medication 120 MCG: at 20:29

## 2022-05-04 RX ADMIN — Medication 120 MCG: at 20:31

## 2022-05-04 RX ADMIN — PROPOFOL 200 MG: 10 INJECTION, EMULSION INTRAVENOUS at 20:24

## 2022-05-04 RX ADMIN — FENTANYL CITRATE 25 MCG: 50 INJECTION, SOLUTION INTRAMUSCULAR; INTRAVENOUS at 21:55

## 2022-05-04 RX ADMIN — HYDROMORPHONE HYDROCHLORIDE 1 MG: 1 INJECTION, SOLUTION INTRAMUSCULAR; INTRAVENOUS; SUBCUTANEOUS at 18:11

## 2022-05-04 RX ADMIN — ONDANSETRON HYDROCHLORIDE 4 MG: 2 INJECTION, SOLUTION INTRAMUSCULAR; INTRAVENOUS at 20:33

## 2022-05-04 RX ADMIN — HYDROMORPHONE HYDROCHLORIDE 1 MG: 1 INJECTION, SOLUTION INTRAMUSCULAR; INTRAVENOUS; SUBCUTANEOUS at 14:23

## 2022-05-04 RX ADMIN — Medication 120 MCG: at 20:34

## 2022-05-04 RX ADMIN — LORAZEPAM 1 MG: 2 INJECTION INTRAMUSCULAR; INTRAVENOUS at 14:20

## 2022-05-04 RX ADMIN — ONDANSETRON 4 MG: 2 INJECTION INTRAMUSCULAR; INTRAVENOUS at 18:11

## 2022-05-04 RX ADMIN — Medication 120 MCG: at 20:41

## 2022-05-04 RX ADMIN — MORPHINE SULFATE 8 MG: 4 INJECTION INTRAVENOUS at 12:02

## 2022-05-04 RX ADMIN — FENTANYL CITRATE 25 MCG: 50 INJECTION, SOLUTION INTRAMUSCULAR; INTRAVENOUS at 21:50

## 2022-05-04 RX ADMIN — CEFTRIAXONE SODIUM 1 G: 1 INJECTION, POWDER, FOR SOLUTION INTRAMUSCULAR; INTRAVENOUS at 20:29

## 2022-05-04 RX ADMIN — Medication 120 MCG: at 20:36

## 2022-05-04 RX ADMIN — SODIUM CHLORIDE 1000 ML: 9 INJECTION, SOLUTION INTRAVENOUS at 11:28

## 2022-05-04 RX ADMIN — FENTANYL CITRATE 100 MCG: 50 INJECTION, SOLUTION INTRAMUSCULAR; INTRAVENOUS at 20:22

## 2022-05-04 RX ADMIN — SODIUM CHLORIDE 75 ML/HR: 9 INJECTION, SOLUTION INTRAVENOUS at 22:38

## 2022-05-04 RX ADMIN — HYDROMORPHONE HYDROCHLORIDE 0.6 MG: 2 INJECTION, SOLUTION INTRAMUSCULAR; INTRAVENOUS; SUBCUTANEOUS at 20:39

## 2022-05-04 RX ADMIN — SODIUM CHLORIDE 434 ML: 9 INJECTION, SOLUTION INTRAVENOUS at 18:15

## 2022-05-04 RX ADMIN — HYDROMORPHONE HYDROCHLORIDE 1 MG: 1 INJECTION, SOLUTION INTRAMUSCULAR; INTRAVENOUS; SUBCUTANEOUS at 13:14

## 2022-05-04 RX ADMIN — MIDAZOLAM HYDROCHLORIDE 2 MG: 1 INJECTION, SOLUTION INTRAMUSCULAR; INTRAVENOUS at 20:17

## 2022-05-04 RX ADMIN — HYDROMORPHONE HYDROCHLORIDE 0.6 MG: 2 INJECTION, SOLUTION INTRAMUSCULAR; INTRAVENOUS; SUBCUTANEOUS at 21:01

## 2022-05-04 RX ADMIN — SODIUM CHLORIDE 500 ML: 9 INJECTION, SOLUTION INTRAVENOUS at 22:38

## 2022-05-04 RX ADMIN — HYDROCODONE BITARTRATE AND ACETAMINOPHEN 1 TABLET: 5; 325 TABLET ORAL at 12:01

## 2022-05-04 RX ADMIN — DIPHENHYDRAMINE HYDROCHLORIDE 12.5 MG: 50 INJECTION, SOLUTION INTRAMUSCULAR; INTRAVENOUS at 21:40

## 2022-05-04 RX ADMIN — FENTANYL CITRATE 25 MCG: 50 INJECTION, SOLUTION INTRAMUSCULAR; INTRAVENOUS at 21:40

## 2022-05-04 RX ADMIN — ONDANSETRON 8 MG: 2 INJECTION INTRAMUSCULAR; INTRAVENOUS at 11:19

## 2022-05-04 RX ADMIN — KETOROLAC TROMETHAMINE 30 MG: 30 INJECTION, SOLUTION INTRAMUSCULAR at 18:11

## 2022-05-04 RX ADMIN — DEXAMETHASONE SODIUM PHOSPHATE 8 MG: 4 INJECTION, SOLUTION INTRAMUSCULAR; INTRAVENOUS at 20:33

## 2022-05-04 RX ADMIN — KETOROLAC TROMETHAMINE 10 MG: 30 INJECTION, SOLUTION INTRAMUSCULAR at 14:22

## 2022-05-04 RX ADMIN — Medication 120 MCG: at 20:39

## 2022-05-04 RX ADMIN — CEFTRIAXONE SODIUM 1 G: 1 INJECTION, POWDER, FOR SOLUTION INTRAMUSCULAR; INTRAVENOUS at 20:35

## 2022-05-04 RX ADMIN — FENTANYL CITRATE 25 MCG: 50 INJECTION, SOLUTION INTRAMUSCULAR; INTRAVENOUS at 21:45

## 2022-05-04 RX ADMIN — Medication 120 MCG: at 20:44

## 2022-05-04 NOTE — ANESTHESIA PREPROCEDURE EVALUATION
Relevant Problems   No relevant active problems       Anesthetic History   No history of anesthetic complications            Review of Systems / Medical History  Patient summary reviewed, nursing notes reviewed and pertinent labs reviewed    Pulmonary  Within defined limits                 Neuro/Psych   Within defined limits           Cardiovascular                  Exercise tolerance: <4 METS  Comments: EKG: sinus tach @ 133   GI/Hepatic/Renal         Renal disease: stones       Endo/Other        Morbid obesity     Other Findings   Comments: OBSTRUCTING LEFT KIDNEY STONE           Physical Exam    Airway  Mallampati: I    Neck ROM: normal range of motion   Mouth opening: Normal     Cardiovascular  Regular rate and rhythm,  S1 and S2 normal,  no murmur, click, rub, or gallop             Dental      Comments: 1 broken tooth   Pulmonary      Decreased breath sounds: bibasilar           Abdominal  GI exam deferred       Other Findings            Anesthetic Plan    ASA: 3  Anesthesia type: general    Monitoring Plan: BIS      Induction: Intravenous  Anesthetic plan and risks discussed with: Patient

## 2022-05-04 NOTE — ED NOTES
Assumed care of pt at this time, report received from Steven, [facility]; pt sitting up in bed, requesting to have her breast pump set up, given to pt and plugged in, she is tearful, alert, oriented.

## 2022-05-04 NOTE — ED TRIAGE NOTES
Pt arrives very tearful with c/o left flank pain that began at 0900. Pt reports N/V with the pain. She took ibuprofen PTA. Also of note is BF her infant.

## 2022-05-04 NOTE — ED PROVIDER NOTES
EMERGENCY DEPARTMENT HISTORY AND PHYSICAL EXAM      Date: 5/4/2022  Patient Name: Jordan Choe    History of Presenting Illness     Chief Complaint   Patient presents with    Transfer Of Care     arrives with EMS from [facility] for further tx of kidney stones x2       History Provided By: Patient    HPI: Jordan Choe, 33 y.o. female presents to the ED with cc of nephrolithiasis. 33 YOF with a history of nephrolithiasis presents emergency department with a chief complaint of transfer of care. Patient reports left flank pain that began at noon. Went to outside ED, underwent CT which showed a 4 mm obstructing stone. Was initially planned to be discharged however developed a fever in the emergency department. Blood cultures and elevated lactate of 2.4 was obtained. Patient was transferred prior to antibiotics. On arrival patient still moaning complaining of left flank pain. Reports some nausea and vomiting. Denies rhinorrhea, sore throat, chest pain but does report mild cough. Denies abdominal pain, nausea, vomiting or diarrhea. Patient also endorses palpitations here. HR elevated to 120s on monitor. There are no other complaints, changes, or physical findings at this time.     PCP: Other, MD Brie    Current Facility-Administered Medications on File Prior to Encounter   Medication Dose Route Frequency Provider Last Rate Last Admin    [COMPLETED] sodium chloride 0.9 % bolus infusion 1,000 mL  1,000 mL IntraVENous NOW Vivian Sanders MD   IV Completed at 05/04/22 1305    [COMPLETED] ketorolac (TORADOL) injection 10 mg  10 mg IntraVENous NOW Arnie Orozco MD   10 mg at 05/04/22 1120    [COMPLETED] morphine injection 8 mg  8 mg IntraVENous ONCE Arnie Orozco MD   8 mg at 05/04/22 1121    [COMPLETED] ondansetron (ZOFRAN) injection 8 mg  8 mg IntraVENous ONCE Arnie Orozco MD   8 mg at 05/04/22 1119    [COMPLETED] morphine injection 8 mg  8 mg IntraVENous ONCE Pam Yadira Landis MD   8 mg at 05/04/22 1202    [COMPLETED] HYDROcodone-acetaminophen (NORCO) 5-325 mg per tablet 1 Tablet  1 Tablet Oral NOW Yadira Orozco MD   1 Tablet at 05/04/22 1201    [COMPLETED] HYDROmorphone (DILAUDID) syringe 1 mg  1 mg IntraVENous NOW Yadira Orozco MD   1 mg at 05/04/22 1314    [COMPLETED] LORazepam (ATIVAN) injection 1 mg  1 mg IntraVENous NOW Yadira Orozco MD   1 mg at 05/04/22 1420    [COMPLETED] ketorolac (TORADOL) injection 10 mg  10 mg IntraVENous NOW Yadira Orozco MD   10 mg at 05/04/22 1422    [COMPLETED] HYDROmorphone (DILAUDID) syringe 1 mg  1 mg IntraVENous NOW Yadira Orozco MD   1 mg at 05/04/22 1423    [DISCONTINUED] sodium chloride (NS) flush 5-10 mL  5-10 mL IntraVENous ONCE Yadira Orozco MD         Current Outpatient Medications on File Prior to Encounter   Medication Sig Dispense Refill    tamsulosin (Flomax) 0.4 mg capsule Take 1 Capsule by mouth daily for 7 doses. 7 Capsule 0    ibuprofen (MOTRIN) 600 mg tablet Take 1 Tablet by mouth every six (6) hours as needed for Pain. 20 Tablet 0    HYDROcodone-acetaminophen (Norco) 5-325 mg per tablet Take 1 Tablet by mouth every six (6) hours as needed for Pain for up to 3 days. Max Daily Amount: 4 Tablets. 10 Tablet 0    ondansetron (ZOFRAN ODT) 4 mg disintegrating tablet Take 1 Tablet by mouth every eight (8) hours as needed for Nausea. 6 Tablet 0       Past History     Past Medical History:  History reviewed. No pertinent past medical history. Past Surgical History:  History reviewed. No pertinent surgical history. Family History:  History reviewed. No pertinent family history.     Social History:  Social History     Tobacco Use    Smoking status: Not on file    Smokeless tobacco: Not on file   Substance Use Topics    Alcohol use: Not on file    Drug use: Not on file       Allergies:  No Known Allergies      Review of Systems   Review of Systems   Constitutional: Positive for fever. Negative for activity change and chills. HENT: Negative for facial swelling and voice change. Eyes: Negative for redness. Respiratory: Negative for cough, shortness of breath and wheezing. Cardiovascular: Negative for chest pain and leg swelling. Gastrointestinal: Positive for abdominal pain, nausea and vomiting. Negative for diarrhea. Genitourinary: Negative for decreased urine volume. Musculoskeletal: Positive for back pain. Negative for gait problem. Skin: Negative for pallor and rash. Neurological: Negative for tremors and facial asymmetry. Psychiatric/Behavioral: Negative for agitation. All other systems reviewed and are negative. Physical Exam   Physical Exam  Vitals and nursing note reviewed. Constitutional:       Comments: Tearful, laying in bed. Appears uncomfortable   HENT:      Head: Normocephalic and atraumatic. Cardiovascular:      Rate and Rhythm: Regular rhythm. Tachycardia present. Heart sounds: No murmur heard. No friction rub. No gallop. Pulmonary:      Effort: Pulmonary effort is normal.      Breath sounds: Normal breath sounds. Abdominal:      Palpations: Abdomen is soft. Tenderness: There is no abdominal tenderness. Musculoskeletal:         General: Normal range of motion. Cervical back: Normal range of motion. Skin:     General: Skin is warm. Capillary Refill: Capillary refill takes less than 2 seconds. Neurological:      General: No focal deficit present. Mental Status: She is alert.    Psychiatric:         Mood and Affect: Mood normal.         Diagnostic Study Results     Labs -     Recent Results (from the past 12 hour(s))   CBC WITH AUTOMATED DIFF    Collection Time: 05/04/22 11:00 AM   Result Value Ref Range    WBC 12.4 (H) 3.6 - 11.0 K/uL    RBC 4.87 3.80 - 5.20 M/uL    HGB 13.7 11.5 - 16.0 g/dL    HCT 41.3 35.0 - 47.0 %    MCV 84.8 80.0 - 99.0 FL    MCH 28.1 26.0 - 34.0 PG    MCHC 33.2 30.0 - 36.5 g/dL    RDW 14.6 (H) 11.5 - 14.5 %    PLATELET 062 320 - 448 K/uL    MPV 10.2 8.9 - 12.9 FL    NRBC 0.0 0  WBC    ABSOLUTE NRBC 0.00 0.00 - 0.01 K/uL    NEUTROPHILS 70 32 - 75 %    LYMPHOCYTES 21 12 - 49 %    MONOCYTES 8 5 - 13 %    EOSINOPHILS 1 0 - 7 %    BASOPHILS 0 0 - 1 %    IMMATURE GRANULOCYTES 0 0.0 - 0.5 %    ABS. NEUTROPHILS 8.5 (H) 1.8 - 8.0 K/UL    ABS. LYMPHOCYTES 2.6 0.8 - 3.5 K/UL    ABS. MONOCYTES 1.0 0.0 - 1.0 K/UL    ABS. EOSINOPHILS 0.2 0.0 - 0.4 K/UL    ABS. BASOPHILS 0.1 0.0 - 0.1 K/UL    ABS. IMM. GRANS. 0.0 0.00 - 0.04 K/UL    DF AUTOMATED     METABOLIC PANEL, COMPREHENSIVE    Collection Time: 05/04/22 11:00 AM   Result Value Ref Range    Sodium 139 136 - 145 mmol/L    Potassium 4.0 3.5 - 5.1 mmol/L    Chloride 105 97 - 108 mmol/L    CO2 21 21 - 32 mmol/L    Anion gap 13 5 - 15 mmol/L    Glucose 122 (H) 65 - 100 mg/dL    BUN 14 6 - 20 MG/DL    Creatinine 0.98 0.55 - 1.02 MG/DL    BUN/Creatinine ratio 14 12 - 20      GFR est AA >60 >60 ml/min/1.73m2    GFR est non-AA >60 >60 ml/min/1.73m2    Calcium 8.3 (L) 8.5 - 10.1 MG/DL    Bilirubin, total 0.2 0.2 - 1.0 MG/DL    ALT (SGPT) 57 12 - 78 U/L    AST (SGOT) 33 15 - 37 U/L    Alk. phosphatase 119 (H) 45 - 117 U/L    Protein, total 7.8 6.4 - 8.2 g/dL    Albumin 3.7 3.5 - 5.0 g/dL    Globulin 4.1 (H) 2.0 - 4.0 g/dL    A-G Ratio 0.9 (L) 1.1 - 2.2     LIPASE    Collection Time: 05/04/22 11:00 AM   Result Value Ref Range    Lipase 111 73 - 393 U/L   SAMPLES BEING HELD    Collection Time: 05/04/22 11:00 AM   Result Value Ref Range    SAMPLES BEING HELD 1SST,1BLUE     COMMENT        Add-on orders for these samples will be processed based on acceptable specimen integrity and analyte stability, which may vary by analyte.    URINALYSIS W/ RFLX MICROSCOPIC    Collection Time: 05/04/22 12:49 PM   Result Value Ref Range    Color YELLOW/STRAW      Appearance HAZY (A) CLEAR      Specific gravity 1.018 1.003 - 1.030      pH (UA) 6.0 5.0 - 8.0      Protein 30 (A) NEG mg/dL    Glucose Negative NEG mg/dL    Ketone Negative NEG mg/dL    Bilirubin Negative NEG      Blood LARGE (A) NEG      Urobilinogen 0.2 0.2 - 1.0 EU/dL    Nitrites Positive (A) NEG      Leukocyte Esterase Negative NEG     URINE MICROSCOPIC ONLY    Collection Time: 05/04/22 12:49 PM   Result Value Ref Range    WBC 5-10 0 - 4 /hpf    RBC  0 - 5 /hpf    Epithelial cells FEW FEW /lpf    Bacteria 1+ (A) NEG /hpf   LACTIC ACID    Collection Time: 05/04/22  1:37 PM   Result Value Ref Range    Lactic acid 2.5 (HH) 0.4 - 2.0 MMOL/L   METABOLIC PANEL, COMPREHENSIVE    Collection Time: 05/04/22  6:29 PM   Result Value Ref Range    Sodium 135 (L) 136 - 145 mmol/L    Potassium 3.9 3.5 - 5.1 mmol/L    Chloride 104 97 - 108 mmol/L    CO2 23 21 - 32 mmol/L    Anion gap 8 5 - 15 mmol/L    Glucose 95 65 - 100 mg/dL    BUN 15 6 - 20 MG/DL    Creatinine 1.28 (H) 0.55 - 1.02 MG/DL    BUN/Creatinine ratio 12 12 - 20      GFR est AA 58 (L) >60 ml/min/1.73m2    GFR est non-AA 48 (L) >60 ml/min/1.73m2    Calcium 8.0 (L) 8.5 - 10.1 MG/DL    Bilirubin, total 0.7 0.2 - 1.0 MG/DL    ALT (SGPT) 53 12 - 78 U/L    AST (SGOT) 28 15 - 37 U/L    Alk.  phosphatase 112 45 - 117 U/L    Protein, total 7.2 6.4 - 8.2 g/dL    Albumin 3.5 3.5 - 5.0 g/dL    Globulin 3.7 2.0 - 4.0 g/dL    A-G Ratio 0.9 (L) 1.1 - 2.2     TROPONIN-HIGH SENSITIVITY    Collection Time: 05/04/22  6:29 PM   Result Value Ref Range    Troponin-High Sensitivity <4 0 - 51 ng/L   COVID-19 RAPID TEST    Collection Time: 05/04/22  6:29 PM   Result Value Ref Range    Specimen source Nasopharyngeal      COVID-19 rapid test Not detected NOTD     INFLUENZA A+B VIRAL AGS    Collection Time: 05/04/22  6:29 PM   Result Value Ref Range    Influenza A Antigen Negative NEG      Influenza B Antigen Negative NEG     EKG, 12 LEAD, INITIAL    Collection Time: 05/04/22  6:29 PM   Result Value Ref Range    Ventricular Rate 133 BPM    Atrial Rate 133 BPM    P-R Interval 112 ms    QRS Duration 70 ms    Q-T Interval 378 ms    QTC Calculation (Bezet) 562 ms    Calculated R Axis 26 degrees    Calculated T Axis 40 degrees    Diagnosis       Sinus tachycardia  Low voltage QRS  No previous ECGs available     POC LACTIC ACID    Collection Time: 05/04/22  6:48 PM   Result Value Ref Range    Lactic Acid (POC) 4.08 (HH) 0.40 - 2.00 mmol/L   CBC WITH AUTOMATED DIFF    Collection Time: 05/04/22  7:46 PM   Result Value Ref Range    WBC 16.6 (H) 3.6 - 11.0 K/uL    RBC 4.17 3.80 - 5.20 M/uL    HGB 11.8 11.5 - 16.0 g/dL    HCT 36.6 35.0 - 47.0 %    MCV 87.8 80.0 - 99.0 FL    MCH 28.3 26.0 - 34.0 PG    MCHC 32.2 30.0 - 36.5 g/dL    RDW 15.0 (H) 11.5 - 14.5 %    PLATELET 459 105 - 209 K/uL    MPV 9.8 8.9 - 12.9 FL    NRBC 0.0 0  WBC    ABSOLUTE NRBC 0.00 0.00 - 0.01 K/uL    NEUTROPHILS PENDING %    LYMPHOCYTES PENDING %    MONOCYTES PENDING %    EOSINOPHILS PENDING %    BASOPHILS PENDING %    IMMATURE GRANULOCYTES PENDING %    ABS. NEUTROPHILS PENDING K/UL    ABS. LYMPHOCYTES PENDING K/UL    ABS. MONOCYTES PENDING K/UL    ABS. EOSINOPHILS PENDING K/UL    ABS. BASOPHILS PENDING K/UL    ABS. IMM. GRANS. PENDING K/UL    DF PENDING    POC LACTIC ACID    Collection Time: 05/04/22  7:49 PM   Result Value Ref Range    Lactic Acid (POC) 3.48 (HH) 0.40 - 2.00 mmol/L   HCG URINE, QL. - POC    Collection Time: 05/04/22  8:13 PM   Result Value Ref Range    Pregnancy test,urine (POC) Negative NEG         Radiologic Studies -   XR CHEST PORT   Final Result   No acute cardiopulmonary disease. XR RETROGRADE PYELOGRAM BILAT    (Results Pending)     CT Results  (Last 48 hours)               05/04/22 1138  CT ABD PELV WO CONT Final result    Impression:  Mild hydroureteronephrosis on the left with proximal left ureteral calculus   measuring 4 mm in size. There are right renal calculi that are nonobstructive. Incidental and/or nonemergent findings are as described in detail above.            Narrative:  CLINICAL HISTORY: ap, left flank pain    INDICATION: ap, left flank pain   COMPARISON: 8/4/2021   CONTRAST:  None. TECHNIQUE:    Thin axial images were obtained through the abdomen and pelvis. Coronal and   sagittal reformats were generated. Oral contrast was not administered. CT dose   reduction was achieved through use of a standardized protocol tailored for this   examination and automatic exposure control for dose modulation. The absence of intravenous contrast material reduces the sensitivity for   evaluation of visceral organs and vasculature including presence of small mass   lesions, hemodynamically significant stenoses, dissections, mucosal   abnormalities etc.       FINDINGS:    LOWER THORAX: No significant abnormality in the incidentally imaged lower chest.   LIVER/GALLBLADDER: Hepatic steatosis Gallbladder is within normal limits. CBD is   not dilated. SPLEEN/PANCREAS:  within normal limits. ADRENALS/KIDNEYS: No adrenal mass. Mild hydroureteronephrosis on the left. There   is a 4 mm calculus in the proximal left ureter. Right renal calculi measuring up   to 8 mm in size. STOMACH: Unremarkable. SMALL BOWEL/COLON: No dilatation or wall thickening. No dilatation or wall   thickening. APPENDIX: Unremarkable. PERITONEUM: No ascites or pneumoperitoneum. RETROPERITONEUM: No lymphadenopathy or aortic aneurysm. REPRODUCTIVE ORGANS: Likely small uterine fibroid. URINARY BLADDER: No mass or calculus. BONES: Generative changes at L5-S1. ADDITIONAL COMMENTS: N/A               CXR Results  (Last 48 hours)               05/04/22 1908  XR CHEST PORT Final result    Impression:  No acute cardiopulmonary disease. Narrative:  INDICATION: Evaluate for infiltrate. Portable AP view of the chest.       There is no prior chest x-ray for direct comparison. Cardiomediastinal silhouette is within normal limits. Lungs are clear   bilaterally.  Pleural spaces are normal and there is no pneumothorax. Osseous   structures are intact. Medical Decision Making   I am the first provider for this patient. I reviewed the vital signs, available nursing notes, past medical history, past surgical history, family history and social history. Vital Signs-Reviewed the patient's vital signs. Patient Vitals for the past 12 hrs:   Temp Pulse Resp BP SpO2   05/04/22 1951 (!) 102.4 °F (39.1 °C) (!) 136 18 120/80 95 %   05/04/22 1850 -- (!) 141 11 -- 93 %   05/04/22 1835 -- (!) 125 27 -- 100 %   05/04/22 1820 -- (!) 139 28 112/79 100 %   05/04/22 1805 -- (!) 126 22 130/80 97 %   05/04/22 1756 (!) 102.1 °F (38.9 °C) (!) 135 28 130/88 97 %   05/04/22 1750 -- -- -- 130/88 96 %     Records Reviewed: Nursing Notes and Old Medical Records    Provider Notes (Medical Decision Making):     41-year-old female presents emergency department as a transfer from outside hospital with a 4 mm obstructing kidney stone. She is tachycardic upon arrival with fever. Concern for sepsis secondary to obstructive nephrolithiasis. Lactate at outside hospital 2.5. Will give dose of ceftriaxone after taking cultures and lactate here. Bolus fluids. Medicate for pain. ED Course:   Initial assessment performed. The patients presenting problems have been discussed, and they are in agreement with the care plan formulated and outlined with them. I have encouraged them to ask questions as they arise throughout their visit. ED Course as of 05/04/22 2017   Wed May 04, 2022   2351 Preliminary EKG interpreted by me. Shows sinus tachycardia with with a HR of 133. No ST elevations or depressions concerning for ischemia. Normal intervals. [MB]   1841 Discussed with Rickey Coley from urology. Dr. Evaristo Naqvi on-call at [address] [place] [address] performing stent.  [MB]   9896 [facility] ED SEPSIS NOTE:     6:49 PM The patient now meets criteria for: Septic Shock    Fluid resuscitation with: 30 mL/kg crystalloid bolus  Due to concern for rapidly advancing infection and deterioration of patient's condition, antibiotics are started STAT and cultures ordered. [MB]   9481 Discussed with Dr. Maury Barnhart. Plan for OR to call for patient. [MB]   1226 I performed a sepsis reassessment of the patient. [MB]   1955 Lactic Acid (POC)(!!): 3.48 [MB]   2010 Dr. Maury Barnhart at bedside, patient to take to OR. Discussed with hospitalist for admission post-procedure. Labs with leukocytosis, COVID and flu negative. CXR without PNA. [MB]      ED Course User Index  [MB] Miri Mojica MD     Critical Care Time:   CRITICAL CARE NOTE :    6:06 PM    IMPENDING DETERIORATION -Cardiovascular, Metabolic and Renal  ASSOCIATED RISK FACTORS - Metabolic changes and Dehydration  MANAGEMENT- Bedside Assessment  INTERPRETATION -  Xrays, ECG and Blood Pressure  INTERVENTIONS - hemodynamic mngmt and Metobolic interventions  CASE REVIEW - Hospitalist/Intensivist, Medical Sub-Specialist and Nursing  TREATMENT RESPONSE -Stable  PERFORMED BY - Self    NOTES   :  I have spent 45 minutes of critical care time involved in lab review, consultations with specialist, family decision- making, bedside attention and documentation. This time excludes time spent in any separate billed procedures. During this entire length of time I was immediately available to the patient . Mee Travis MD      Disposition:    Admitted    Diagnosis     Clinical Impression:   1. Septic shock ([address]    2. Obstructive pyelonephritis    3. Flank pain        Attestations:    Mee Travis MD    Please note that this dictation was completed with [employer]eg[employer], the computer voice recognition software. Quite often unanticipated grammatical, syntax, homophones, and other interpretive errors are inadvertently transcribed by the computer software. Please disregard these errors. Please excuse any errors that have escaped final proofreading. Thank you.

## 2022-05-04 NOTE — PROGRESS NOTES
Writer contacted [facility] and spoke with Vicente Hartley to arrange BLS transport for this patient to [facility] ED. Requested information provided ( Dx, wt, ht, room air, saline lock, no isolation precautions and insurance information given) ETA [id number]-- RITA Edwards made aware.

## 2022-05-04 NOTE — Clinical Note
[address] [place] [address] EMERGENCY DEP  [address] [facility] [address] [place] [id number]  [phone]    Work/School Note    Date: 5/4/2022    To Whom It May concern:    Mario Matias was seen and treated today in the emergency room by the following provider(s):  Attending Provider: Damari Weber MD.      Mario Matias is excused from work/school on 05/04/22 and 05/05/22. She is medically clear to return to work/school on 5/6/2022. Sincerely,          Louise Parrish.  MD Pam

## 2022-05-04 NOTE — ED PROVIDER NOTES
EMERGENCY DEPARTMENT HISTORY AND PHYSICAL EXAM          Date: 5/4/2022  Patient Name: Rebecca Fraser    History of Presenting Illness     Chief Complaint   Patient presents with    Flank Pain       History Provided By: Patient    HPI: Rebecca Fraser is a 33 y.o. female, without significant history presents ambulatory to the ER for left flank pain. Patient states she has been doing okay until 930 this morning when she developed sudden onset flank pain. Notes it radiates around to her abdomen and is associated with fevers, chills, nausea, vomiting and diarrhea. Her temperature at home was 100. She has not had any blood in her vomit or stools. She is currently exclusively for sleep breast-feeding so only took Motrin for pain and currently rates it at 10/10. PCP: Ralph, MD Brie    Allergies: None known  Social Hx: -tobacco, -vaping, -EtOH, -Illicit Drugs; There are no other complaints, changes, or physical findings at this time. Past History     Past Medical History:  History reviewed. No pertinent past medical history. Past Surgical History:  No past surgical history on file. Family History:  History reviewed. No pertinent family history. Social History:  Social History     Tobacco Use    Smoking status: Not on file    Smokeless tobacco: Not on file   Substance Use Topics    Alcohol use: Not on file    Drug use: Not on file       Allergies:  No Known Allergies      Review of Systems   Review of Systems   Constitutional: Negative for activity change, appetite change, chills, fever and unexpected weight change. HENT: Negative for congestion. Eyes: Negative for pain and visual disturbance. Respiratory: Negative for cough and shortness of breath. Cardiovascular: Negative for chest pain. Gastrointestinal: Negative for abdominal pain, diarrhea, nausea and vomiting. Genitourinary: Positive for flank pain. Negative for dysuria. Musculoskeletal: Negative for back pain. Skin: Negative for rash. Neurological: Negative for headaches. Physical Exam   Physical Exam  Vitals and nursing note reviewed. Constitutional:       Appearance: She is well-developed. She is not diaphoretic. Comments: Obese young female screaming out and writhing in pain   HENT:      Head: Normocephalic and atraumatic. Eyes:      General:         Right eye: No discharge. Left eye: No discharge. Conjunctiva/sclera: Conjunctivae normal.      Pupils: Pupils are equal, round, and reactive to light. Cardiovascular:      Rate and Rhythm: Normal rate and regular rhythm. Heart sounds: Normal heart sounds. No murmur heard. Pulmonary:      Effort: Pulmonary effort is normal. No respiratory distress. Breath sounds: Normal breath sounds. No stridor. No wheezing, rhonchi or rales. Abdominal:      General: Bowel sounds are normal. There is no distension. Palpations: Abdomen is soft. Tenderness: There is abdominal tenderness (Left upper and lower quadrant). Musculoskeletal:         General: Normal range of motion. Cervical back: Normal range of motion and neck supple. Skin:     General: Skin is warm and dry. Findings: No rash. Neurological:      Mental Status: She is alert and oriented to person, place, and time. Cranial Nerves: No cranial nerve deficit. Motor: No abnormal muscle tone.        Diagnostic Study Results     Labs -     Recent Results (from the past 12 hour(s))   CBC WITH AUTOMATED DIFF    Collection Time: 05/04/22 11:00 AM   Result Value Ref Range    WBC 12.4 (H) 3.6 - 11.0 K/uL    RBC 4.87 3.80 - 5.20 M/uL    HGB 13.7 11.5 - 16.0 g/dL    HCT 41.3 35.0 - 47.0 %    MCV 84.8 80.0 - 99.0 FL    MCH 28.1 26.0 - 34.0 PG    MCHC 33.2 30.0 - 36.5 g/dL    RDW 14.6 (H) 11.5 - 14.5 %    PLATELET 414 310 - 986 K/uL    MPV 10.2 8.9 - 12.9 FL    NRBC 0.0 0  WBC    ABSOLUTE NRBC 0.00 0.00 - 0.01 K/uL    NEUTROPHILS 70 32 - 75 %    LYMPHOCYTES 21 12 - 49 %    MONOCYTES 8 5 - 13 %    EOSINOPHILS 1 0 - 7 %    BASOPHILS 0 0 - 1 %    IMMATURE GRANULOCYTES 0 0.0 - 0.5 %    ABS. NEUTROPHILS 8.5 (H) 1.8 - 8.0 K/UL    ABS. LYMPHOCYTES 2.6 0.8 - 3.5 K/UL    ABS. MONOCYTES 1.0 0.0 - 1.0 K/UL    ABS. EOSINOPHILS 0.2 0.0 - 0.4 K/UL    ABS. BASOPHILS 0.1 0.0 - 0.1 K/UL    ABS. IMM. GRANS. 0.0 0.00 - 0.04 K/UL    DF AUTOMATED     METABOLIC PANEL, COMPREHENSIVE    Collection Time: 05/04/22 11:00 AM   Result Value Ref Range    Sodium 139 136 - 145 mmol/L    Potassium 4.0 3.5 - 5.1 mmol/L    Chloride 105 97 - 108 mmol/L    CO2 21 21 - 32 mmol/L    Anion gap 13 5 - 15 mmol/L    Glucose 122 (H) 65 - 100 mg/dL    BUN 14 6 - 20 MG/DL    Creatinine 0.98 0.55 - 1.02 MG/DL    BUN/Creatinine ratio 14 12 - 20      GFR est AA >60 >60 ml/min/1.73m2    GFR est non-AA >60 >60 ml/min/1.73m2    Calcium 8.3 (L) 8.5 - 10.1 MG/DL    Bilirubin, total 0.2 0.2 - 1.0 MG/DL    ALT (SGPT) 57 12 - 78 U/L    AST (SGOT) 33 15 - 37 U/L    Alk. phosphatase 119 (H) 45 - 117 U/L    Protein, total 7.8 6.4 - 8.2 g/dL    Albumin 3.7 3.5 - 5.0 g/dL    Globulin 4.1 (H) 2.0 - 4.0 g/dL    A-G Ratio 0.9 (L) 1.1 - 2.2     LIPASE    Collection Time: 05/04/22 11:00 AM   Result Value Ref Range    Lipase 111 73 - 393 U/L   SAMPLES BEING HELD    Collection Time: 05/04/22 11:00 AM   Result Value Ref Range    SAMPLES BEING HELD 1SST,1BLUE     COMMENT        Add-on orders for these samples will be processed based on acceptable specimen integrity and analyte stability, which may vary by analyte. Radiologic Studies -   CT ABD PELV WO CONT   Final Result   Mild hydroureteronephrosis on the left with proximal left ureteral calculus   measuring 4 mm in size. There are right renal calculi that are nonobstructive. Incidental and/or nonemergent findings are as described in detail above.             CT Results  (Last 48 hours)               05/04/22 1138  CT ABD PELV WO CONT Final result    Impression:  Mild hydroureteronephrosis on the left with proximal left ureteral calculus   measuring 4 mm in size. There are right renal calculi that are nonobstructive. Incidental and/or nonemergent findings are as described in detail above. Narrative:  CLINICAL HISTORY: ap, left flank pain    INDICATION: ap, left flank pain   COMPARISON: 8/4/2021   CONTRAST:  None. TECHNIQUE:    Thin axial images were obtained through the abdomen and pelvis. Coronal and   sagittal reformats were generated. Oral contrast was not administered. CT dose   reduction was achieved through use of a standardized protocol tailored for this   examination and automatic exposure control for dose modulation. The absence of intravenous contrast material reduces the sensitivity for   evaluation of visceral organs and vasculature including presence of small mass   lesions, hemodynamically significant stenoses, dissections, mucosal   abnormalities etc.       FINDINGS:    LOWER THORAX: No significant abnormality in the incidentally imaged lower chest.   LIVER/GALLBLADDER: Hepatic steatosis Gallbladder is within normal limits. CBD is   not dilated. SPLEEN/PANCREAS:  within normal limits. ADRENALS/KIDNEYS: No adrenal mass. Mild hydroureteronephrosis on the left. There   is a 4 mm calculus in the proximal left ureter. Right renal calculi measuring up   to 8 mm in size. STOMACH: Unremarkable. SMALL BOWEL/COLON: No dilatation or wall thickening. No dilatation or wall   thickening. APPENDIX: Unremarkable. PERITONEUM: No ascites or pneumoperitoneum. RETROPERITONEUM: No lymphadenopathy or aortic aneurysm. REPRODUCTIVE ORGANS: Likely small uterine fibroid. URINARY BLADDER: No mass or calculus. BONES: Generative changes at L5-S1. ADDITIONAL COMMENTS: N/A               CXR Results  (Last 48 hours)    None            Medical Decision Making   I am the first provider for this patient.     I reviewed the vital signs, available nursing notes, past medical history, past surgical history, family history and social history. Vital Signs-Reviewed the patient's vital signs. Patient Vitals for the past 12 hrs:   Temp Pulse Resp BP SpO2   05/04/22 1151 -- 70 20 -- 99 %   05/04/22 1109 98.6 °F (37 °C) 71 30 (!) 125/90 98 %     Pulse Oximetry Analysis - 98% on RA    Records Reviewed: Nursing Notes, Old Medical Records, Previous Radiology Studies and Previous Laboratory Studies    Provider Notes (Medical Decision Making):   MDM: Female presenting with cute onset left flank pain. Currently hemodynamically stable with low suspicion for sepsis or dissection. Symptomatic medications to be provided while obtaining labs and imaging. ED Course:   Initial assessment performed. The patients presenting problems have been discussed, and they are in agreement with the care plan formulated and outlined with them. I have encouraged them to ask questions as they arise throughout their visit. PROGRESS NOTE:    ED Course as of 05/04/22 1201   Wed May 04, 2022   1135 Patient appears slightly more comfortable. She continues to cry but is no longer screaming out in pain. Vital signs remain normal. [JT]   1200 Continues with 8/10 pain. Repeat medications to be provided. Discussed CT results and recommendations for home care [JT]      ED Course User Index  [JT] Keith Peralta., MD        Discharge note:  Pt re-evaluated and noted to be feeling better, ready for discharge. Updated pt on all final lab and imaging findings. Will follow up as instructed. All questions have been answered, pt voiced understanding and agreement with plan. Narcotics were prescribed, pt was advised not to drive or operate heavy machinery. Specific return precautions provided as well as instructions to return to the ED should sx worsen at any time. Vital signs stable for discharge. Critical Care Time:   0      Diagnosis     Clinical Impression:   1.  Ureteral calculus, left        PLAN:  1. Current Discharge Medication List      START taking these medications    Details   tamsulosin (Flomax) 0.4 mg capsule Take 1 Capsule by mouth daily for 7 doses. Qty: 7 Capsule, Refills: 0  Start date: 5/4/2022, End date: 5/11/2022      ibuprofen (MOTRIN) 600 mg tablet Take 1 Tablet by mouth every six (6) hours as needed for Pain. Qty: 20 Tablet, Refills: 0  Start date: 5/4/2022      HYDROcodone-acetaminophen (Norco) 5-325 mg per tablet Take 1 Tablet by mouth every six (6) hours as needed for Pain for up to 3 days. Max Daily Amount: 4 Tablets. Qty: 10 Tablet, Refills: 0  Start date: 5/4/2022, End date: 5/7/2022    Associated Diagnoses: Ureteral calculus, left           2. Follow-up Information     Follow up With Specialties Details Why Contact [address] Emergency Medicine  If symptoms worsen [address] [address] [facility] Schedule an appointment as soon as possible for a visit   [address] [place] [address]  [phone]        Return to ED if worse     Disposition:  Home       Please note, this dictation was completed with [employer], the computer voice recognition software. Quite often unanticipated grammatical, syntax, homophones, and other interpretive errors are inadvertently transcribed by the computer software. Please disregard these errors. Please excuse any errors that have escaped final proof reading.

## 2022-05-05 LAB
ANION GAP SERPL CALC-SCNC: 7 MMOL/L (ref 5–15)
ATRIAL RATE: 133 BPM
BUN SERPL-MCNC: 14 MG/DL (ref 6–20)
BUN/CREAT SERPL: 12 (ref 12–20)
CALCIUM SERPL-MCNC: 7.8 MG/DL (ref 8.5–10.1)
CALCULATED R AXIS, ECG10: 26 DEGREES
CALCULATED T AXIS, ECG11: 40 DEGREES
CHLORIDE SERPL-SCNC: 110 MMOL/L (ref 97–108)
CO2 SERPL-SCNC: 22 MMOL/L (ref 21–32)
CREAT SERPL-MCNC: 1.13 MG/DL (ref 0.55–1.02)
DIAGNOSIS, 93000: NORMAL
ERYTHROCYTE [DISTWIDTH] IN BLOOD BY AUTOMATED COUNT: 15.5 % (ref 11.5–14.5)
GLUCOSE SERPL-MCNC: 147 MG/DL (ref 65–100)
HCT VFR BLD AUTO: 36.4 % (ref 35–47)
HGB BLD-MCNC: 11.4 G/DL (ref 11.5–16)
LACTATE SERPL-SCNC: 2.8 MMOL/L (ref 0.4–2)
MCH RBC QN AUTO: 28.1 PG (ref 26–34)
MCHC RBC AUTO-ENTMCNC: 31.3 G/DL (ref 30–36.5)
MCV RBC AUTO: 89.7 FL (ref 80–99)
NRBC # BLD: 0 K/UL (ref 0–0.01)
NRBC BLD-RTO: 0 PER 100 WBC
P-R INTERVAL, ECG05: 112 MS
PLATELET # BLD AUTO: 191 K/UL (ref 150–400)
PMV BLD AUTO: 10.2 FL (ref 8.9–12.9)
POTASSIUM SERPL-SCNC: 4.5 MMOL/L (ref 3.5–5.1)
Q-T INTERVAL, ECG07: 378 MS
QRS DURATION, ECG06: 70 MS
QTC CALCULATION (BEZET), ECG08: 562 MS
RBC # BLD AUTO: 4.06 M/UL (ref 3.8–5.2)
SODIUM SERPL-SCNC: 139 MMOL/L (ref 136–145)
VENTRICULAR RATE, ECG03: 133 BPM
WBC # BLD AUTO: 32.3 K/UL (ref 3.6–11)

## 2022-05-05 PROCEDURE — 74011250636 HC RX REV CODE- 250/636: Performed by: STUDENT IN AN ORGANIZED HEALTH CARE EDUCATION/TRAINING PROGRAM

## 2022-05-05 PROCEDURE — 65270000046 HC RM TELEMETRY

## 2022-05-05 PROCEDURE — 80048 BASIC METABOLIC PNL TOTAL CA: CPT

## 2022-05-05 PROCEDURE — 74011000258 HC RX REV CODE- 258: Performed by: STUDENT IN AN ORGANIZED HEALTH CARE EDUCATION/TRAINING PROGRAM

## 2022-05-05 PROCEDURE — 83605 ASSAY OF LACTIC ACID: CPT

## 2022-05-05 PROCEDURE — 74011000250 HC RX REV CODE- 250: Performed by: STUDENT IN AN ORGANIZED HEALTH CARE EDUCATION/TRAINING PROGRAM

## 2022-05-05 PROCEDURE — 74011250636 HC RX REV CODE- 250/636: Performed by: INTERNAL MEDICINE

## 2022-05-05 PROCEDURE — 85027 COMPLETE CBC AUTOMATED: CPT

## 2022-05-05 PROCEDURE — 36415 COLL VENOUS BLD VENIPUNCTURE: CPT

## 2022-05-05 PROCEDURE — 74011250637 HC RX REV CODE- 250/637: Performed by: STUDENT IN AN ORGANIZED HEALTH CARE EDUCATION/TRAINING PROGRAM

## 2022-05-05 RX ORDER — TAMSULOSIN HYDROCHLORIDE 0.4 MG/1
0.4 CAPSULE ORAL DAILY
Status: DISCONTINUED | OUTPATIENT
Start: 2022-05-06 | End: 2022-05-11 | Stop reason: HOSPADM

## 2022-05-05 RX ORDER — HYDROCODONE BITARTRATE AND ACETAMINOPHEN 5; 325 MG/1; MG/1
1 TABLET ORAL
Status: DISCONTINUED | OUTPATIENT
Start: 2022-05-05 | End: 2022-05-10

## 2022-05-05 RX ORDER — HYDROMORPHONE HYDROCHLORIDE 1 MG/ML
1 INJECTION, SOLUTION INTRAMUSCULAR; INTRAVENOUS; SUBCUTANEOUS
Status: DISCONTINUED | OUTPATIENT
Start: 2022-05-05 | End: 2022-05-10

## 2022-05-05 RX ADMIN — MORPHINE SULFATE 2 MG: 2 INJECTION, SOLUTION INTRAMUSCULAR; INTRAVENOUS at 12:25

## 2022-05-05 RX ADMIN — MORPHINE SULFATE 2 MG: 2 INJECTION, SOLUTION INTRAMUSCULAR; INTRAVENOUS at 04:16

## 2022-05-05 RX ADMIN — MORPHINE SULFATE 2 MG: 2 INJECTION, SOLUTION INTRAMUSCULAR; INTRAVENOUS at 08:06

## 2022-05-05 RX ADMIN — HYDROMORPHONE HYDROCHLORIDE 1 MG: 1 INJECTION, SOLUTION INTRAMUSCULAR; INTRAVENOUS; SUBCUTANEOUS at 22:50

## 2022-05-05 RX ADMIN — HYDROMORPHONE HYDROCHLORIDE 1 MG: 1 INJECTION, SOLUTION INTRAMUSCULAR; INTRAVENOUS; SUBCUTANEOUS at 14:33

## 2022-05-05 RX ADMIN — SODIUM CHLORIDE, PRESERVATIVE FREE 10 ML: 5 INJECTION INTRAVENOUS at 06:41

## 2022-05-05 RX ADMIN — HYDROMORPHONE HYDROCHLORIDE 1 MG: 1 INJECTION, SOLUTION INTRAMUSCULAR; INTRAVENOUS; SUBCUTANEOUS at 18:40

## 2022-05-05 RX ADMIN — ACETAMINOPHEN 325MG 650 MG: 325 TABLET ORAL at 21:50

## 2022-05-05 RX ADMIN — MORPHINE SULFATE 2 MG: 2 INJECTION, SOLUTION INTRAMUSCULAR; INTRAVENOUS at 00:09

## 2022-05-05 RX ADMIN — SODIUM CHLORIDE, PRESERVATIVE FREE 10 ML: 5 INJECTION INTRAVENOUS at 20:12

## 2022-05-05 RX ADMIN — CEFTRIAXONE 1 G: 1 INJECTION, POWDER, FOR SOLUTION INTRAMUSCULAR; INTRAVENOUS at 20:10

## 2022-05-05 NOTE — OP NOTES
[address]  OPERATIVE REPORT    Name:  Freddy Armenta  MR#:  [id number]  :  1990  ACCOUNT #:  [de-identified]  DATE OF SERVICE:  2022      PREOPERATIVE DIAGNOSIS:  Left ureteral stone with urinary tract infection, rule out sepsis. POSTOPERATIVE DIAGNOSIS:  Left ureteral stone with urinary tract infection, rule out sepsis. PROCEDURE PERFORMED:  Cystoscopy with left ureteral stent placement. SURGEON:  Татьяна Perez MD    ASSISTANT:  None. ANESTHESIA:  General.    COMPLICATIONS:  None. SPECIMENS REMOVED:  Urine from the bladder sent for culture. IMPLANTS:  6-Uzbek 22-cm double-J stent. ESTIMATED BLOOD LOSS:  None. INDICATIONS:  See diagnosis. PROCEDURE IN DETAIL:  She underwent a general anesthetic and was placed in the dorsal lithotomy position, and she was prepped and draped in a sterile fashion. She did receive Rocephin 1 g and I requested another gram IV. A time-out was called, confirming the planned procedure. The 21-Uzbek cystoscope with a 30-degree lens and video camera was used. The bladder was severely retroflexed with a deep base. The scope was angulated, but able to find the left ureteral orifice. The left ureteral orifice was intubated with 0.035 angled tip wire through a TigerTail catheter and fluoroscopy was used to guide the wire into the left renal pelvis without difficulty. The stone was not independently seen on fluoroscopy. The TigerTail catheter was taken off. Then the 6-Uzbek 22-cm double-J stent was placed over the wire through the cystoscope under fluoroscopic guidance and confirmed to be in good position both fluoroscopically and cystoscopically. There was some turbid urine with debris that was collected for urine culture. The bladder was emptied and the scope was withdrawn, completing the procedure. There were no complications and no blood loss. She was stable on my departure from the operative suite.   She is to be admitted to the hospitalist team on the urosepsis protocol and we will follow her tomorrow to arrange followup in the office with KUB and urinalysis to confirm clearance of the infection before definitive management of the stone.         MD BRADLEY [email or web address]  D:  05/04/2022 20:58  T:  05/05/2022 5:44  JOB #:  [id number]

## 2022-05-05 NOTE — ED NOTES
Pt placed on bedpan, voided 100ml of light chuy colored urine, pt on her menstrual cycle light pink in color, poc pregnancy bedside test started  Negative urine preg resulted, see chart.

## 2022-05-05 NOTE — PROGRESS NOTES
2217: TRANSFER - IN REPORT:    Verbal report received from Jovanna Olsen RN(name) on Mario Matias  being received from [facility]) for routine post - op      Report consisted of patients Situation, Background, Assessment and   Recommendations(SBAR). Information from the following report(s) SBAR, Kardex, ED Summary, Procedure Summary, Intake/Output, MAR, Recent Results and Cardiac Rhythm Sinus Tach was reviewed with the receiving nurse. Opportunity for questions and clarification was provided. Assessment completed upon patients arrival to unit and care assumed. End of Shift Note    Bedside shift change report given to Shawna Tyler (oncoming nurse) by Gladis Nye RN (offgoing nurse). Report included the following information SBAR, Kardex, ED Summary, Intake/Output, MAR, Recent Results and Cardiac Rhythm ST    Shift worked:  [id number]     Shift summary and any significant changes:    Blood cx from [facility] resulted with 4/4 gram negative rods. Lactic 2.8. Pain complained, morphine given. SEE MAR. Breast pumping every 4 hours. Concerns for physician to address:  Want to speak to lactaction consultant regarding pain meds. Zone phone for oncoming shift:          Activity:  Activity Level:  Up with Assistance  Number times ambulated in hallways past shift: 0  Number of times OOB to chair past shift: 0    Cardiac:   Cardiac Monitoring: Yes      Cardiac Rhythm: Sinus Tachy    Access:   Current line(s): PIV     Genitourinary:   Urinary status: voiding and external catheter    Respiratory:   O2 Device: None (Room air)  Chronic home O2 use?: NO  Incentive spirometer at bedside: NO       GI:  Last Bowel Movement Date:  (PTA)  Current diet:  ADULT DIET Regular  Passing flatus: YES  Tolerating current diet: YES       Pain Management:   Patient states pain is manageable on current regimen: YES    Skin:  Gómez Score: 23  Interventions: float heels, increase time out of bed and internal/external urinary devices    Patient Safety:  Fall Score:  Total Score: 1  Interventions: bed/chair alarm, assistive device (walker, cane, etc), gripper socks, pt to call before getting OOB and stay with me (per policy)       Length of Stay:  Expected LOS: - - -  Actual LOS: [address] RN

## 2022-05-05 NOTE — LACTATION NOTE
Met with mother to review medications and discuss breastfeeding her 9 month old. Mother states that he is exclusively . She has maintained her supply overnight pumping twice with her Spectra breast pump. She obtained and total of 10oz. The medication of concern is Morphine 2mg IV PRN. Researched using Ahn's Medications and Mother's Milk. Morphine is an L3 with a short half life and safe to breastfeed an 9 month old. Discussed with mother and provided pump cleaning equipment.      Artist Old RNC, IBCLC

## 2022-05-05 NOTE — ANESTHESIA POSTPROCEDURE EVALUATION
Procedure(s):  CYSTOSCOPY, LEFT  URETERAL STENT INSERTION. general    Anesthesia Post Evaluation        Patient location during evaluation: PACU  Note status: Adequate. Level of consciousness: responsive to verbal stimuli and sleepy but conscious  Pain management: satisfactory to patient  Airway patency: patent  Anesthetic complications: no  Cardiovascular status: acceptable  Respiratory status: acceptable  Hydration status: acceptable  Comments: +Post-Anesthesia Evaluation and Assessment    Patient: Franco Estrella MRN: [id number]  SSN: [id number]   YOB: 1990  Age: 33 y.o. Sex: female      Cardiovascular Function/Vital Signs    BP (!) 93/49   Pulse (!) 119   Temp (!) 38.7 °C (101.6 °F)   Resp 17   Ht 5' 1\" (1.549 m)   Wt 115.7 kg (255 lb)   SpO2 96%   BMI 48.18 kg/m²     Patient is status post Procedure(s):  CYSTOSCOPY, LEFT  URETERAL STENT INSERTION. Nausea/Vomiting: Controlled. Postoperative hydration reviewed and adequate. Pain:  Pain Scale 1: Numeric (0 - 10) (05/04/22 2055)  Pain Intensity 1: 6 (05/04/22 2055)   Managed. Neurological Status:   Neuro (WDL): Exceptions to WDL (05/04/22 2055)   At baseline. Mental Status and Level of Consciousness: Arousable. Pulmonary Status:   O2 Device: Nasal cannula (05/04/22 2107)   Adequate oxygenation and airway patent. Complications related to anesthesia: None    Post-anesthesia assessment completed. No concerns. Signed By: Irma Stevenson MD    5/4/2022  Post anesthesia nausea and vomiting:  controlled      INITIAL Post-op Vital signs:   Vitals Value Taken Time   BP 93/49 05/04/22 2120   Temp 38.7 °C (101.6 °F) 05/04/22 2107   Pulse 119 05/04/22 2125   Resp 20 05/04/22 2125   SpO2 97 % 05/04/22 2125   Vitals shown include unvalidated device data.

## 2022-05-05 NOTE — ED NOTES
Bedside report completed with Waleska Jett RN from the OR. Pt clothing removed and bagged, labeled, pair of shoes, pants, underwear, 1 necklace with a cross white metal for both pieces; pt has her breast pump bag, purse;   Security took wallet, cash, cards, some pieces of small jewelry 2 rings and earring white metal each, security form/receipt given to [address] [place] [address]

## 2022-05-05 NOTE — ADT AUTH CERT NOTES
[address]     FACILITY NPI :[id number]       [address]  [facility] 2 PROGRESSIVE CARE  [address] [facility] [address] [phone]     [address]: [phone]  Fax: [phone]      Patient Name :Jaja Escoto   : 1990 (31 yrs)  MRN : [id number]     Patient Mailing Address [address] [place] [address] [47] , [id number]                                                             .         Insurance Plan Payor: Ping Hicks / Plan: Treinta Y Krishan 5747 PPO / Product Type: PPO /      Primary Coverage Subscriber ID :  [id number]         Current Patient Class : INPATIENT  Admit Date : 2022     REQUESTED LEVEL OF CARE: INPATIENT [101]                                                           Diagnosis : Severe sepsis (HCC);UTI (urinary tract infection); Urolithiasis                          ICD10 Code : Severe sepsis ([facility] [address] [A41.9, R65.20]  UTI (urinary tract infection) [N39.0]  Urolithiasis [N20.9]     Current Room and Bed      Admitting and Attending Info:  Admitting Provider : Aldo Pal MD   NPI: [id number]  Admitting Provider Phone.  [phone]  Admitting Provider Address: [address] [place] [address], [address] [place] [address] Way     Attending Provider Rose Huizar MD   [id number]  Attending Provider Address:  [address] [place] [address]     Attending Provider Phone: Chetan jolley phone: [phone]               Utilization Reviews         Urinary Tract Infection (UTI) - Care Day 2 (2022) by Ryan Sosa       Review Entered Review Status   2022 14:36 Completed      Criteria Review      Care Day: 2 Care Date: 2022 Level of Care: Step Down    Guideline Day 2    Level Of Care    ( ) Floor    5/5/2022 14:36:20 EDT by Tonie Agee      Stepdown    Clinical Status    (X) * Hypotension absent    5/5/2022 14:36:20 EDT by Tonie Agee      99/55, HR up to 121, 22, RA sat 97%    (X) * Mental status at baseline    5/5/2022 14:36:20 EDT by Tonie Agee      baseline    (X) * Afebrile or fever improved    5/5/2022 14:36:20 EDT by Tonie Agee      temp 98.9    (X) * Vomiting absent or improved    5/5/2022 14:36:20 EDT by Tonie Agee      no vomiting    Activity    (X) * Ambulatory    5/5/2022 14:36:20 EDT by Nolene Goodell act as sussy    Routes    (X) IV fluids    5/5/2022 14:36:20 EDT by Nolene Goodell NS at 75cc/hr    (X) IV medications    5/5/2022 14:36:20 EDT by Tonie Agee      IV abx and IV pain meds as doc    (X) Advance diet as tolerated    5/5/2022 14:36:20 EDT by Tonie Agee      reg diet    Interventions    (X) * Reversible urinary system abnormality (eg, obstruction, abscess) absent, addressed, or to be treated at next level of care    5/5/2022 14:36:20 EDT by Mendel Breed is POD 1 s/p cysto and stent placement. Urine cx sent 5/4 resulted positive for GNR. Bld cx from 5/4 at [address] [place] [address] (repeat) both resulted GNR    (X) WBC    5/5/2022 14:36:20 EDT by Tonie Agee      WBC 32.3.    (X) Renal function tests    5/5/2022 14:36:20 EDT by Tonie Agee      Lactic acid 2.8. Chl 110, gluc 147, creat 1.13, phuong 7.8. Medications    (X) Antibiotics    5/5/2022 14:36:20 EDT by Tonie Agee      Rocephin 1g IV Q24H    (X) Possible analgesics    5/5/2022 14:36:20 EDT by Tonie Agee      morphine 2mg IV Q4H PRN x4 and dc at 1413.  New order for dilaudid 1mg IV Q4H PRN started    * Milestone   Additional Notes   Plan:       - discussed with pt need for definitive stone treatment and stent removal. Follow up arranged.    -follow cultures, target abx therapy once able.    -pain management of stent colic           Urinary Tract Infection (UTI) - Care Day 1 (5/4/2022) by Brie Jaime Entered Review Status   5/5/2022 09:55 Completed      Criteria Review      Care Day: 1 Care Date: 5/4/2022 Level of Care: Step Down    Guideline Day 1    Level Of Care    ( ) Floor    5/5/2022 09:55:51 EDT by Margart Snellen Stepdown    Clinical Status    (X) * Clinical Indications met    5/5/2022 09:55:51 EDT by Teresa Earl 31yr old female transferred to this facility from other facility where presented for acute onset lt flank pain. Tachycardic, hypotensive, febrile. RR 20, RA sat 95%. 115.7kg. To OR urgently at 2045 now s/p cysto, lt ureteral stent    (X) Fever    5/5/2022 09:55:51 EDT by Margart Snellen      Febrile up to 102.4. Tachycardic with HR sust 117-141, hypotensive with BP down to 87/46    (X) Possible dysuria, chills, and flank pain    5/5/2022 09:55:51 EDT by Margart Snellen      Lt flank pain 10/10 associated with n/v. 12 lead EKG: sinus tachy, rate 133. Routes    (X) IV fluids    5/5/2022 09:55:51 EDT by Margart Snellen      NS IVF bolus 500cc x2, 1000cc x1 and then at 75cc/hr    (X) IV medications    5/5/2022 09:55:51 EDT by Margart Snellen      Zofran 4mg IV x1    (X) Diet as tolerated    5/5/2022 09:55:51 EDT by Shaheen Tolberton diet post op    Interventions    (X) Urinalysis and urine culture    5/5/2022 09:55:51 EDT by Margart Snellen      UA: hazy, 30prot, lg bld, nitrite POSITIVE, 1+bact. Urine cx pend    (X) Possible blood cultures    5/5/2022 09:55:51 EDT by Belinda Duncan9 K 66 cx from 5/4 at transferring facility POSITIVE for GNR 4/4 bottles. Bld cx x2 more sent at 1915    (X) Renal function tests, CBC    5/5/2022 09:55:51 EDT by Margart Snellen      WBC 16.6. LA 2.5. Na 135, creat 1.28, phuong 8.0,    (X) Possible CT, ultrasound, or other imaging test    5/5/2022 09:55:51 EDT by Margart Snellen      CT abd/pelvis: mild hydroureteronephrosis on lt w/prox lt ureteral calculus 4mm. CXR: no acute cp disease. Medications    (X) Antibiotics    5/5/2022 09:55:51 EDT by Yamini Lemus      Rocephin 2g IV x1 and 1g IV Q24H    (X) Possible analgesics    5/5/2022 09:55:51 EDT by Yamini Lemus      Dilaudid 1mg IV x1, toradol 30mg IV x1, Benadryl 12.5mg IV x1    * Milestone   Additional Notes   Assessment / Plan:   Severe sepsis    D/t UTI   Obstructing Left ureteral stone   Mild hydroureteronephrosis       Leukocytosis, Lactic acidosis on presentation   CT abdomen:   Mild hydroureteronephrosis on the left with proximal left ureteral calculus   measuring 4 mm in size. There are right renal calculi that are nonobstructive.       S/p urgent cystoscopy and stenting by urology this evening    S/p 30 cc/kg fluids at outside hospital   Phenylephrine ordered in OR but never started. Recent BP in low 100s, remains tachycardiac, will give 1 more bolus and c/w iv fluids   Follow up urine, blood culture   Follow up urology   Repeat Lactate until less than 2   Continue with iv Rocephin   Monitor in step down unit for now, if needs pressors, would consider transfer to ICU      PHYSICAL EXAM:    General:          Alert, cooperative, no distress, appears stated age.      HEENT:           Atraumatic, anicteric sclerae, pink conjunctivae                           No oral ulcers, mucosa moist, throat clear, dentition fair   Neck:               Supple, symmetrical,  thyroid: non tender   Lungs:             Clear to auscultation bilaterally.  No Wheezing or Rhonchi. No rales. Chest wall:      No tenderness  No Accessory muscle use. Heart:              Regular  rhythm,  No  murmur   No edema   Abdomen:        Left flank tenderness   Extremities:     No cyanosis.  No clubbing,                             Skin turgor normal, Capillary refill normal, Radial dial pulse 2+   Skin:                Not pale.  Not Jaundiced  No rashes    Psych:             Good insight.  Not depressed.  Not anxious or agitated. Neurologic:      EOMs intact.  No facial asymmetry. No aphasia or slurred speech. Symmetrical strength, Sensation grossly intact. Alert and oriented X 4.         Urinary Tract Infection (UTI) - Clinical Indications for Admission to Inpatient Care by Deborah Tillman       Review Entered Review Status   5/5/2022 09:41 Completed      Criteria Review      Clinical Indications for Admission to Inpatient Care    Most Recent : Tommie Braswell Most Recent Date: 5/5/2022 09:41:31 EDT    (X) Admission is indicated for  1 or more  of the following  (1) (2) (3) (4) (5) (6) (7) (8):       (X) Persistence or worsening of clinical finding (eg, fever, pain, dehydration, vomiting) despite       observation care       5/5/2022 09:41:31 EDT by Tommie Braswell         Lft flank pain       (X) Parenteral antibiotics needed beyond observation care (eg, patient cannot take oral medication,       known or suspected pathogen resistance to oral agents)       5/5/2022 09:41:31 EDT by Tommie Braswell         Req OR and IV abx    Notes:    5/5/2022 09:41:31 EDT by Tommie Braswell    Subject: Additional Clinical Information      * 31yr old female with no sig PMH to ED c/o left flank pain with acute onset at 0930 today. Pt screaming out in pain and writhing in bed in ed, rating pain at 10/10, lactic acidosis of 2.4. Transferred to this ED from another facility for urologic management of nephrolithiais sec to obstructing kidney stone 4mm. Endorses palpitations. Admit           H&P Notes       H&P by Cassi Rodrigues MD at 05/04/22 2211 documented on ED to Hosp-Admission (Current) from 5/4/2022 in [facility] 2 PROGRESSIVE CARE    Author: Cassi Rodrigues MD Author Type: Physician Filed: 05/04/22 2239   Note Status: Addendum Cosign: Cosign Not Required Date of Service: 05/04/22 2211   : Cassi Rodrigues MD (Physician)       Prior Versions: 1. Cassi Rodrigues MD (Physician) at 05/04/22 2234 - Addendum    2.  Cassi Rodrigues MD (Physician) at 05/04/22 2234 - Addendum 3. Adi Cintron MD (Physician) at 22 - Addendum    4. Adi Cintron MD (Physician) at 22 - Addendum    5. Adi Cintron MD (Physician) at 22 - Addendum    6. Adi Cintron MD (Physician) at 22 - Addendum    7. Adi Cintron MD (Physician) at 22 - One [facility] All                    Hospitalist Admission Note     NAME:            Tristen Mera   :               1990   MRN:               [id number]      Date/Time:      2022 10:12 PM     Patient PCP: Brie Rosas MD  ______________________________________________________________________  Given the patient's current clinical presentation, I have a high level of concern for decompensation if discharged from the emergency department.  Complex decision making was performed, which includes reviewing the patient's available past medical records, laboratory results, and x-ray films.        My assessment of this patient's clinical condition and my plan of care is as follows.     Assessment / Plan:  Severe sepsis   D/t UTI  Obstructing Left ureteral stone  Mild hydroureteronephrosis     Leukocytosis, Lactic acidosis on presentation  CT abdomen:  Mild hydroureteronephrosis on the left with proximal left ureteral calculus  measuring 4 mm in size. There are right renal calculi that are nonobstructive.     S/p urgent cystoscopy and stenting by urology this evening   S/p 30 cc/kg fluids at outside hospital  Phenylephrine ordered in OR but never started.   Recent BP in low 100s, remains tachycardiac, will give 1 more bolus and c/w iv fluids  Follow up urine, blood culture  Follow up urology  Repeat Lactate until less than 2  Continue with iv Rocephin  Monitor in step down unit for now, if needs pressors, would consider transfer to ICU     Code Status: Full code  Surrogate Decision Maker: Her fiancee     DVT Prophylaxis: No ac for now, as complains of hematuria  GI Prophylaxis: not indicated     Baseline: From home, independent                 Subjective:   CHIEF COMPLAINT: Belly pain     HISTORY OF PRESENT ILLNESS:     Rin Orta is a 33 y.o. female with no significant past medical history presented with left flank pain since this morning. The pain was sudden in onset severe in intensity, associate with some blood in the urine. [facility] and was transferred here. She underwent cystoscopy and left ureteral stent earlier this evening. She denies any chest pain, shortness of breath, lower extremity edema or tenderness. She mentions having some loose bowel movement this morning. I evaluated her after her transfer from PACU and she feels drowsy, continues to have left flank pain.     We were asked to admit for work up and evaluation of the above problems.           Past Medical History:   Diagnosis Date    Nephrolithiasis           History reviewed. No pertinent surgical history.     Social History           Tobacco Use    Smoking status: Not on file    Smokeless tobacco: Not on file   Substance Use Topics    Alcohol use: Not on file         History reviewed. No pertinent family history. No Known Allergies              Prior to Admission medications    Medication Sig Start Date End Date Taking? Authorizing Provider   tamsulosin (Flomax) 0.4 mg capsule Take 1 Capsule by mouth daily for 7 doses. 5/4/22 5/11/22   Manuel Orozco MD   ibuprofen (MOTRIN) 600 mg tablet Take 1 Tablet by mouth every six (6) hours as needed for Pain. 5/4/22     Manuel Orozco MD   HYDROcodone-acetaminophen (Norco) 5-325 mg per tablet Take 1 Tablet by mouth every six (6) hours as needed for Pain for up to 3 days. Max Daily Amount: 4 Tablets. 5/4/22 5/7/22   Manuel Orozco MD   ondansetron (ZOFRAN ODT) 4 mg disintegrating tablet Take 1 Tablet by mouth every eight (8) hours as needed for Nausea.  5/4/22     Manuel Orozco MD         REVIEW OF SYSTEMS:       Total of 12 systems reviewed as follows:                                        POSITIVE= underlined text  Negative = text not underlined  General:                     fever, chills, sweats, generalized weakness, weight loss/gain,                                       loss of appetite   Eyes:                           blurred vision, eye pain, loss of vision, double vision  ENT:                            rhinorrhea, pharyngitis   Respiratory:               cough, sputum production, SOB, HAMMOND, wheezing, pleuritic pain   Cardiology:                chest pain, palpitations, orthopnea, PND, edema, syncope   Gastrointestinal:       abdominal pain , N/V, diarrhea, dysphagia, constipation, bleeding   Genitourinary:           frequency, urgency, dysuria, hematuria, incontinence   Muskuloskeletal :      arthralgia, myalgia, back pain  Hematology:              easy bruising, nose or gum bleeding, lymphadenopathy   Dermatological:         rash, ulceration, pruritis, color change / jaundice  Endocrine:                 hot flashes or polydipsia   Neurological:             headache, dizziness, confusion, focal weakness, paresthesia,                                      Speech difficulties, memory loss, gait difficulty  Psychological:          Feelings of anxiety, depression, agitation     Objective:   VITALS:    Visit Vitals  BP (!) 97/59   Pulse (!) 124   Temp (!) 100.8 °F (38.2 °C)   Resp 20   Ht 5' 1\" (1.549 m)   Wt 115.7 kg (255 lb)   SpO2 97%   BMI 48.18 kg/m²         PHYSICAL EXAM:     General:          Alert, cooperative, no distress, appears stated age. HEENT:           Atraumatic, anicteric sclerae, pink conjunctivae                          No oral ulcers, mucosa moist, throat clear, dentition fair  Neck:               Supple, symmetrical,  thyroid: non tender  Lungs:             Clear to auscultation bilaterally. No Wheezing or Rhonchi. No rales.   Chest wall:      No tenderness  No Accessory muscle use.  Heart:              Regular  rhythm,  No  murmur   No edema  Abdomen:        Left flank tenderness  Extremities:     No cyanosis. No clubbing,                            Skin turgor normal, Capillary refill normal, Radial dial pulse 2+  Skin:                Not pale. Not Jaundiced  No rashes   Psych:             Good insight. Not depressed. Not anxious or agitated. Neurologic:      EOMs intact. No facial asymmetry. No aphasia or slurred speech. Symmetrical strength, Sensation grossly intact.  Alert and oriented X 4.      _______________________________________________________________________  Care Plan discussed with:      Comments   Patient y     Family        RN y     Care Manager                      Consultant:        _______________________________________________________________________  Expected  Disposition:   Home with Family y   HH/PT/OT/RN     SNF/LTC     KEDAR     ________________________________________________________________________  TOTAL TIME:  40 Minutes     Critical Care Provided     Minutes non procedure based         Comments       Reviewed previous records   >50% of visit spent in counseling and coordination of care   Discussion with patient and/or family and questions answered         ________________________________________________________________________  Signed: Marycarmen Ludwig MD     Procedures: see electronic medical records for all procedures/Xrays and details which were not copied into this note but were reviewed prior to creation of Plan.     LAB DATA REVIEWED:    Recent Results          Recent Results (from the past 24 hour(s))   CBC WITH AUTOMATED DIFF     Collection Time: 05/04/22 11:00 AM   Result Value Ref Range     WBC 12.4 (H) 3.6 - 11.0 K/uL     RBC 4.87 3.80 - 5.20 M/uL     HGB 13.7 11.5 - 16.0 g/dL     HCT 41.3 35.0 - 47.0 %     MCV 84.8 80.0 - 99.0 FL     MCH 28.1 26.0 - 34.0 PG     MCHC 33.2 30.0 - 36.5 g/dL     RDW 14.6 (H) 11.5 - 14.5 %     PLATELET 133 309 - 400 K/uL     MPV 10.2 8.9 - 12.9 FL     NRBC 0.0 0  WBC     ABSOLUTE NRBC 0.00 0.00 - 0.01 K/uL     NEUTROPHILS 70 32 - 75 %     LYMPHOCYTES 21 12 - 49 %     MONOCYTES 8 5 - 13 %     EOSINOPHILS 1 0 - 7 %     BASOPHILS 0 0 - 1 %     IMMATURE GRANULOCYTES 0 0.0 - 0.5 %     ABS. NEUTROPHILS 8.5 (H) 1.8 - 8.0 K/UL     ABS. LYMPHOCYTES 2.6 0.8 - 3.5 K/UL     ABS. MONOCYTES 1.0 0.0 - 1.0 K/UL     ABS. EOSINOPHILS 0.2 0.0 - 0.4 K/UL     ABS. BASOPHILS 0.1 0.0 - 0.1 K/UL     ABS. IMM. GRANS. 0.0 0.00 - 0.04 K/UL     DF AUTOMATED     METABOLIC PANEL, COMPREHENSIVE     Collection Time: 05/04/22 11:00 AM   Result Value Ref Range     Sodium 139 136 - 145 mmol/L     Potassium 4.0 3.5 - 5.1 mmol/L     Chloride 105 97 - 108 mmol/L     CO2 21 21 - 32 mmol/L     Anion gap 13 5 - 15 mmol/L     Glucose 122 (H) 65 - 100 mg/dL     BUN 14 6 - 20 MG/DL     Creatinine 0.98 0.55 - 1.02 MG/DL     BUN/Creatinine ratio 14 12 - 20       GFR est AA >60 >60 ml/min/1.73m2     GFR est non-AA >60 >60 ml/min/1.73m2     Calcium 8.3 (L) 8.5 - 10.1 MG/DL     Bilirubin, total 0.2 0.2 - 1.0 MG/DL     ALT (SGPT) 57 12 - 78 U/L     AST (SGOT) 33 15 - 37 U/L     Alk. phosphatase 119 (H) 45 - 117 U/L     Protein, total 7.8 6.4 - 8.2 g/dL     Albumin 3.7 3.5 - 5.0 g/dL     Globulin 4.1 (H) 2.0 - 4.0 g/dL     A-G Ratio 0.9 (L) 1.1 - 2.2     LIPASE     Collection Time: 05/04/22 11:00 AM   Result Value Ref Range     Lipase 111 73 - 393 U/L   SAMPLES BEING HELD     Collection Time: 05/04/22 11:00 AM   Result Value Ref Range     SAMPLES BEING HELD 1SST,1BLUE       COMMENT           Add-on orders for these samples will be processed based on acceptable specimen integrity and analyte stability, which may vary by analyte.    URINALYSIS W/ RFLX MICROSCOPIC     Collection Time: 05/04/22 12:49 PM   Result Value Ref Range     Color YELLOW/STRAW       Appearance HAZY (A) CLEAR       Specific gravity 1.018 1.003 - 1.030       pH (UA) 6.0 5.0 - 8.0       Protein 30 (A) NEG mg/dL     Glucose Negative NEG mg/dL     Ketone Negative NEG mg/dL     Bilirubin Negative NEG       Blood LARGE (A) NEG       Urobilinogen 0.2 0.2 - 1.0 EU/dL     Nitrites Positive (A) NEG       Leukocyte Esterase Negative NEG     URINE MICROSCOPIC ONLY     Collection Time: 05/04/22 12:49 PM   Result Value Ref Range     WBC 5-10 0 - 4 /hpf     RBC  0 - 5 /hpf     Epithelial cells FEW FEW /lpf     Bacteria 1+ (A) NEG /hpf   LACTIC ACID     Collection Time: 05/04/22  1:37 PM   Result Value Ref Range     Lactic acid 2.5 (HH) 0.4 - 2.0 MMOL/L   METABOLIC PANEL, COMPREHENSIVE     Collection Time: 05/04/22  6:29 PM   Result Value Ref Range     Sodium 135 (L) 136 - 145 mmol/L     Potassium 3.9 3.5 - 5.1 mmol/L     Chloride 104 97 - 108 mmol/L     CO2 23 21 - 32 mmol/L     Anion gap 8 5 - 15 mmol/L     Glucose 95 65 - 100 mg/dL     BUN 15 6 - 20 MG/DL     Creatinine 1.28 (H) 0.55 - 1.02 MG/DL     BUN/Creatinine ratio 12 12 - 20       GFR est AA 58 (L) >60 ml/min/1.73m2     GFR est non-AA 48 (L) >60 ml/min/1.73m2     Calcium 8.0 (L) 8.5 - 10.1 MG/DL     Bilirubin, total 0.7 0.2 - 1.0 MG/DL     ALT (SGPT) 53 12 - 78 U/L     AST (SGOT) 28 15 - 37 U/L     Alk.  phosphatase 112 45 - 117 U/L     Protein, total 7.2 6.4 - 8.2 g/dL     Albumin 3.5 3.5 - 5.0 g/dL     Globulin 3.7 2.0 - 4.0 g/dL     A-G Ratio 0.9 (L) 1.1 - 2.2     TROPONIN-HIGH SENSITIVITY     Collection Time: 05/04/22  6:29 PM   Result Value Ref Range     Troponin-High Sensitivity <4 0 - 51 ng/L   COVID-19 RAPID TEST     Collection Time: 05/04/22  6:29 PM   Result Value Ref Range     Specimen source Nasopharyngeal       COVID-19 rapid test Not detected NOTD     INFLUENZA A+B VIRAL AGS     Collection Time: 05/04/22  6:29 PM   Result Value Ref Range     Influenza A Antigen Negative NEG       Influenza B Antigen Negative NEG     EKG, 12 LEAD, INITIAL     Collection Time: 05/04/22  6:29 PM   Result Value Ref Range     Ventricular Rate 133 BPM     Atrial Rate 133 BPM     P-R Interval 112 ms     QRS Duration 70 ms     Q-T Interval 378 ms     QTC Calculation (Bezet) 562 ms     Calculated R Axis 26 degrees     Calculated T Axis 40 degrees     Diagnosis           Sinus tachycardia  Low voltage QRS  No previous ECGs available      POC LACTIC ACID     Collection Time: 05/04/22  6:48 PM   Result Value Ref Range     Lactic Acid (POC) 4.08 (HH) 0.40 - 2.00 mmol/L   CBC WITH AUTOMATED DIFF     Collection Time: 05/04/22  7:46 PM   Result Value Ref Range     WBC 16.6 (H) 3.6 - 11.0 K/uL     RBC 4.17 3.80 - 5.20 M/uL     HGB 11.8 11.5 - 16.0 g/dL     HCT 36.6 35.0 - 47.0 %     MCV 87.8 80.0 - 99.0 FL     MCH 28.3 26.0 - 34.0 PG     MCHC 32.2 30.0 - 36.5 g/dL     RDW 15.0 (H) 11.5 - 14.5 %     PLATELET 125 246 - 281 K/uL     MPV 9.8 8.9 - 12.9 FL     NRBC 0.0 0  WBC     ABSOLUTE NRBC 0.00 0.00 - 0.01 K/uL     NEUTROPHILS 79 (H) 32 - 75 %     BAND NEUTROPHILS 19 %     LYMPHOCYTES 2 (L) 12 - 49 %     MONOCYTES 0 (L) 5 - 13 %     EOSINOPHILS 0 0 - 7 %     BASOPHILS 0 0 - 1 %     IMMATURE GRANULOCYTES 0 0.0 - 0.5 %     ABS. NEUTROPHILS 16.3 (H) 1.8 - 8.0 K/UL     ABS. LYMPHOCYTES 0.3 (L) 0.8 - 3.5 K/UL     ABS. MONOCYTES 0.0 0.0 - 1.0 K/UL     ABS. EOSINOPHILS 0.0 0.0 - 0.4 K/UL     ABS. BASOPHILS 0.0 0.0 - 0.1 K/UL     ABS. IMM.  GRANS. 0.0 0.00 - 0.04 K/UL     DF MANUAL       PLATELET COMMENTS CLUMPED PLATELETS       RBC COMMENTS NORMOCYTIC, NORMOCHROMIC       WBC COMMENTS VACUOLATED POLYS     POC LACTIC ACID     Collection Time: 05/04/22  7:49 PM   Result Value Ref Range     Lactic Acid (POC) 3.48 (HH) 0.40 - 2.00 mmol/L   HCG URINE, QL. - POC     Collection Time: 05/04/22  8:13 PM   Result Value Ref Range     Pregnancy test,urine (POC) Negative NEG

## 2022-05-05 NOTE — H&P
Hospitalist Admission Note    NAME: Yvette Alfonso   :  1990   MRN:  [id number]     Date/Time:  2022 10:12 PM    Patient PCP: Brie Rosas MD  ______________________________________________________________________  Given the patient's current clinical presentation, I have a high level of concern for decompensation if discharged from the emergency department. Complex decision making was performed, which includes reviewing the patient's available past medical records, laboratory results, and x-ray films. My assessment of this patient's clinical condition and my plan of care is as follows. Assessment / Plan:  Severe sepsis   D/t UTI  Obstructing Left ureteral stone  Mild hydroureteronephrosis    Leukocytosis, Lactic acidosis on presentation  CT abdomen:  Mild hydroureteronephrosis on the left with proximal left ureteral calculus  measuring 4 mm in size. There are right renal calculi that are nonobstructive. S/p urgent cystoscopy and stenting by urology this evening   S/p 30 cc/kg fluids at outside hospital  Phenylephrine ordered in OR but never started. Recent BP in low 100s, remains tachycardiac, will give 1 more bolus and c/w iv fluids  Follow up urine, blood culture  Follow up urology  Repeat Lactate until less than 2  Continue with iv Rocephin  Monitor in step down unit for now, if needs pressors, would consider transfer to ICU    Code Status: Full code  Surrogate Decision Maker: Her fiancee    DVT Prophylaxis: No ac for now, as complains of hematuria  GI Prophylaxis: not indicated    Baseline: From home, independent      Subjective:   CHIEF COMPLAINT: Belly pain    HISTORY OF PRESENT ILLNESS:     Ana Paula Schmidt is a 33 y.o. female with no significant past medical history presented with left flank pain since this morning. The pain was sudden in onset severe in intensity, associate with some blood in the urine. She went to hospitals for this and was transferred here.   She underwent cystoscopy and left ureteral stent earlier this evening. She denies any chest pain, shortness of breath, lower extremity edema or tenderness. She mentions having some loose bowel movement this morning. I evaluated her after her transfer from PACU and she feels drowsy, continues to have left flank pain. We were asked to admit for work up and evaluation of the above problems. Past Medical History:   Diagnosis Date    Nephrolithiasis         History reviewed. No pertinent surgical history. Social History     Tobacco Use    Smoking status: Not on file    Smokeless tobacco: Not on file   Substance Use Topics    Alcohol use: Not on file        History reviewed. No pertinent family history. No Known Allergies     Prior to Admission medications    Medication Sig Start Date End Date Taking? Authorizing Provider   tamsulosin (Flomax) 0.4 mg capsule Take 1 Capsule by mouth daily for 7 doses. 5/4/22 5/11/22  Sriram Orozco MD   ibuprofen (MOTRIN) 600 mg tablet Take 1 Tablet by mouth every six (6) hours as needed for Pain. 5/4/22   Sriram Orozco MD   HYDROcodone-acetaminophen (Norco) 5-325 mg per tablet Take 1 Tablet by mouth every six (6) hours as needed for Pain for up to 3 days. Max Daily Amount: 4 Tablets. 5/4/22 5/7/22  Sriram Orozco MD   ondansetron (ZOFRAN ODT) 4 mg disintegrating tablet Take 1 Tablet by mouth every eight (8) hours as needed for Nausea.  5/4/22   Sriram Orozco MD       REVIEW OF SYSTEMS:         Total of 12 systems reviewed as follows:       POSITIVE= underlined text  Negative = text not underlined  General:  fever, chills, sweats, generalized weakness, weight loss/gain,      loss of appetite   Eyes:    blurred vision, eye pain, loss of vision, double vision  ENT:    rhinorrhea, pharyngitis   Respiratory:   cough, sputum production, SOB, HAMMOND, wheezing, pleuritic pain   Cardiology:   chest pain, palpitations, orthopnea, PND, edema, syncope Gastrointestinal:  abdominal pain , N/V, diarrhea, dysphagia, constipation, bleeding   Genitourinary:  frequency, urgency, dysuria, hematuria, incontinence   Muskuloskeletal :  arthralgia, myalgia, back pain  Hematology:  easy bruising, nose or gum bleeding, lymphadenopathy   Dermatological: rash, ulceration, pruritis, color change / jaundice  Endocrine:   hot flashes or polydipsia   Neurological:  headache, dizziness, confusion, focal weakness, paresthesia,     Speech difficulties, memory loss, gait difficulty  Psychological: Feelings of anxiety, depression, agitation    Objective:   VITALS:    Visit Vitals  BP (!) 97/59   Pulse (!) 124   Temp (!) 100.8 °F (38.2 °C)   Resp 20   Ht 5' 1\" (1.549 m)   Wt 115.7 kg (255 lb)   SpO2 97%   BMI 48.18 kg/m²       PHYSICAL EXAM:    General:    Alert, cooperative, no distress, appears stated age. HEENT: Atraumatic, anicteric sclerae, pink conjunctivae     No oral ulcers, mucosa moist, throat clear, dentition fair  Neck:  Supple, symmetrical,  thyroid: non tender  Lungs:   Clear to auscultation bilaterally. No Wheezing or Rhonchi. No rales. Chest wall:  No tenderness  No Accessory muscle use. Heart:   Regular  rhythm,  No  murmur   No edema  Abdomen:   Left flank tenderness  Extremities: No cyanosis. No clubbing,      Skin turgor normal, Capillary refill normal, Radial dial pulse 2+  Skin:     Not pale. Not Jaundiced  No rashes   Psych:  Good insight. Not depressed. Not anxious or agitated. Neurologic: EOMs intact. No facial asymmetry. No aphasia or slurred speech. Symmetrical strength, Sensation grossly intact.  Alert and oriented X 4.     _______________________________________________________________________  Care Plan discussed with:    Comments   Patient y    Family      RN y    Care Manager                    Consultant:      _______________________________________________________________________  Expected  Disposition:   Home with Family y   HH/PT/OT/RN SNF/LTC    KEDAR    ________________________________________________________________________  TOTAL TIME:  37 Minutes    Critical Care Provided     Minutes non procedure based      Comments     Reviewed previous records   >50% of visit spent in counseling and coordination of care  Discussion with patient and/or family and questions answered       ________________________________________________________________________  Signed: Aram Scheuermann, MD    Procedures: see electronic medical records for all procedures/Xrays and details which were not copied into this note but were reviewed prior to creation of Plan. LAB DATA REVIEWED:    Recent Results (from the past 24 hour(s))   CBC WITH AUTOMATED DIFF    Collection Time: 05/04/22 11:00 AM   Result Value Ref Range    WBC 12.4 (H) 3.6 - 11.0 K/uL    RBC 4.87 3.80 - 5.20 M/uL    HGB 13.7 11.5 - 16.0 g/dL    HCT 41.3 35.0 - 47.0 %    MCV 84.8 80.0 - 99.0 FL    MCH 28.1 26.0 - 34.0 PG    MCHC 33.2 30.0 - 36.5 g/dL    RDW 14.6 (H) 11.5 - 14.5 %    PLATELET 155 176 - 470 K/uL    MPV 10.2 8.9 - 12.9 FL    NRBC 0.0 0  WBC    ABSOLUTE NRBC 0.00 0.00 - 0.01 K/uL    NEUTROPHILS 70 32 - 75 %    LYMPHOCYTES 21 12 - 49 %    MONOCYTES 8 5 - 13 %    EOSINOPHILS 1 0 - 7 %    BASOPHILS 0 0 - 1 %    IMMATURE GRANULOCYTES 0 0.0 - 0.5 %    ABS. NEUTROPHILS 8.5 (H) 1.8 - 8.0 K/UL    ABS. LYMPHOCYTES 2.6 0.8 - 3.5 K/UL    ABS. MONOCYTES 1.0 0.0 - 1.0 K/UL    ABS. EOSINOPHILS 0.2 0.0 - 0.4 K/UL    ABS. BASOPHILS 0.1 0.0 - 0.1 K/UL    ABS. IMM.  GRANS. 0.0 0.00 - 0.04 K/UL    DF AUTOMATED     METABOLIC PANEL, COMPREHENSIVE    Collection Time: 05/04/22 11:00 AM   Result Value Ref Range    Sodium 139 136 - 145 mmol/L    Potassium 4.0 3.5 - 5.1 mmol/L    Chloride 105 97 - 108 mmol/L    CO2 21 21 - 32 mmol/L    Anion gap 13 5 - 15 mmol/L    Glucose 122 (H) 65 - 100 mg/dL    BUN 14 6 - 20 MG/DL    Creatinine 0.98 0.55 - 1.02 MG/DL    BUN/Creatinine ratio 14 12 - 20      GFR est AA >60 >60 ml/min/1.73m2    GFR est non-AA >60 >60 ml/min/1.73m2    Calcium 8.3 (L) 8.5 - 10.1 MG/DL    Bilirubin, total 0.2 0.2 - 1.0 MG/DL    ALT (SGPT) 57 12 - 78 U/L    AST (SGOT) 33 15 - 37 U/L    Alk. phosphatase 119 (H) 45 - 117 U/L    Protein, total 7.8 6.4 - 8.2 g/dL    Albumin 3.7 3.5 - 5.0 g/dL    Globulin 4.1 (H) 2.0 - 4.0 g/dL    A-G Ratio 0.9 (L) 1.1 - 2.2     LIPASE    Collection Time: 05/04/22 11:00 AM   Result Value Ref Range    Lipase 111 73 - 393 U/L   SAMPLES BEING HELD    Collection Time: 05/04/22 11:00 AM   Result Value Ref Range    SAMPLES BEING HELD 1SST,1BLUE     COMMENT        Add-on orders for these samples will be processed based on acceptable specimen integrity and analyte stability, which may vary by analyte.    URINALYSIS W/ RFLX MICROSCOPIC    Collection Time: 05/04/22 12:49 PM   Result Value Ref Range    Color YELLOW/STRAW      Appearance HAZY (A) CLEAR      Specific gravity 1.018 1.003 - 1.030      pH (UA) 6.0 5.0 - 8.0      Protein 30 (A) NEG mg/dL    Glucose Negative NEG mg/dL    Ketone Negative NEG mg/dL    Bilirubin Negative NEG      Blood LARGE (A) NEG      Urobilinogen 0.2 0.2 - 1.0 EU/dL    Nitrites Positive (A) NEG      Leukocyte Esterase Negative NEG     URINE MICROSCOPIC ONLY    Collection Time: 05/04/22 12:49 PM   Result Value Ref Range    WBC 5-10 0 - 4 /hpf    RBC  0 - 5 /hpf    Epithelial cells FEW FEW /lpf    Bacteria 1+ (A) NEG /hpf   LACTIC ACID    Collection Time: 05/04/22  1:37 PM   Result Value Ref Range    Lactic acid 2.5 (HH) 0.4 - 2.0 MMOL/L   METABOLIC PANEL, COMPREHENSIVE    Collection Time: 05/04/22  6:29 PM   Result Value Ref Range    Sodium 135 (L) 136 - 145 mmol/L    Potassium 3.9 3.5 - 5.1 mmol/L    Chloride 104 97 - 108 mmol/L    CO2 23 21 - 32 mmol/L    Anion gap 8 5 - 15 mmol/L    Glucose 95 65 - 100 mg/dL    BUN 15 6 - 20 MG/DL    Creatinine 1.28 (H) 0.55 - 1.02 MG/DL    BUN/Creatinine ratio 12 12 - 20      GFR est AA 58 (L) >60 ml/min/1.73m2    GFR est non-AA 48 (L) >60 ml/min/1.73m2    Calcium 8.0 (L) 8.5 - 10.1 MG/DL    Bilirubin, total 0.7 0.2 - 1.0 MG/DL    ALT (SGPT) 53 12 - 78 U/L    AST (SGOT) 28 15 - 37 U/L    Alk.  phosphatase 112 45 - 117 U/L    Protein, total 7.2 6.4 - 8.2 g/dL    Albumin 3.5 3.5 - 5.0 g/dL    Globulin 3.7 2.0 - 4.0 g/dL    A-G Ratio 0.9 (L) 1.1 - 2.2     TROPONIN-HIGH SENSITIVITY    Collection Time: 05/04/22  6:29 PM   Result Value Ref Range    Troponin-High Sensitivity <4 0 - 51 ng/L   COVID-19 RAPID TEST    Collection Time: 05/04/22  6:29 PM   Result Value Ref Range    Specimen source Nasopharyngeal      COVID-19 rapid test Not detected NOTD     INFLUENZA A+B VIRAL AGS    Collection Time: 05/04/22  6:29 PM   Result Value Ref Range    Influenza A Antigen Negative NEG      Influenza B Antigen Negative NEG     EKG, 12 LEAD, INITIAL    Collection Time: 05/04/22  6:29 PM   Result Value Ref Range    Ventricular Rate 133 BPM    Atrial Rate 133 BPM    P-R Interval 112 ms    QRS Duration 70 ms    Q-T Interval 378 ms    QTC Calculation (Bezet) 562 ms    Calculated R Axis 26 degrees    Calculated T Axis 40 degrees    Diagnosis       Sinus tachycardia  Low voltage QRS  No previous ECGs available     POC LACTIC ACID    Collection Time: 05/04/22  6:48 PM   Result Value Ref Range    Lactic Acid (POC) 4.08 (HH) 0.40 - 2.00 mmol/L   CBC WITH AUTOMATED DIFF    Collection Time: 05/04/22  7:46 PM   Result Value Ref Range    WBC 16.6 (H) 3.6 - 11.0 K/uL    RBC 4.17 3.80 - 5.20 M/uL    HGB 11.8 11.5 - 16.0 g/dL    HCT 36.6 35.0 - 47.0 %    MCV 87.8 80.0 - 99.0 FL    MCH 28.3 26.0 - 34.0 PG    MCHC 32.2 30.0 - 36.5 g/dL    RDW 15.0 (H) 11.5 - 14.5 %    PLATELET 186 156 - 215 K/uL    MPV 9.8 8.9 - 12.9 FL    NRBC 0.0 0  WBC    ABSOLUTE NRBC 0.00 0.00 - 0.01 K/uL    NEUTROPHILS 79 (H) 32 - 75 %    BAND NEUTROPHILS 19 %    LYMPHOCYTES 2 (L) 12 - 49 %    MONOCYTES 0 (L) 5 - 13 %    EOSINOPHILS 0 0 - 7 %    BASOPHILS 0 0 - 1 %    IMMATURE GRANULOCYTES 0 0.0 - 0.5 %    ABS. NEUTROPHILS 16.3 (H) 1.8 - 8.0 K/UL    ABS. LYMPHOCYTES 0.3 (L) 0.8 - 3.5 K/UL    ABS. MONOCYTES 0.0 0.0 - 1.0 K/UL    ABS. EOSINOPHILS 0.0 0.0 - 0.4 K/UL    ABS. BASOPHILS 0.0 0.0 - 0.1 K/UL    ABS. IMM.  GRANS. 0.0 0.00 - 0.04 K/UL    DF MANUAL      PLATELET COMMENTS CLUMPED PLATELETS      RBC COMMENTS NORMOCYTIC, NORMOCHROMIC      WBC COMMENTS VACUOLATED POLYS     POC LACTIC ACID    Collection Time: 05/04/22  7:49 PM   Result Value Ref Range    Lactic Acid (POC) 3.48 (HH) 0.40 - 2.00 mmol/L   HCG URINE, QL. - POC    Collection Time: 05/04/22  8:13 PM   Result Value Ref Range    Pregnancy test,urine (POC) Negative NEG

## 2022-05-05 NOTE — PROGRESS NOTES
Hospitalist Progress Note    NAME: Arturo Hammond   :  1990   MRN:  [id number]       Assessment / Plan:  Left-sided hydronephrosis due to nephrolithiasis  Severe sepsis  Gram-negative kayli bacteremia  -S/p cystoscopy with stent placement  -Blood cultures are growing gram-negative bacteremia. Continue ceftriaxone. Follow blood culture results. Follow urine culture results.  -Continue Dilaudid and Norco for pain control  -Add Flomax  -Urology is following  -Continue IV fluids  -Check labs in a.m. tomorrow      Body mass index is 48.18 kg/m². Code status: Full  Prophylaxis: Lovenox  Recommended Disposition: Home w/Family     Subjective:     Chief Complaint / Reason for Physician Visit  Reports that flank pain is about 5 x 10. Mild nausea but no vomiting  Fever curve is better      Review of Systems:  Symptom Y/N Comments  Symptom Y/N Comments   Fever/Chills    Chest Pain     Poor Appetite    Edema     Cough    Abdominal Pain     Sputum    Joint Pain     SOB/HAMMOND    Pruritis/Rash     Nausea/vomit    Tolerating PT/OT     Diarrhea    Tolerating Diet     Constipation    Other       Could NOT obtain due to:      Objective:     VITALS:   Last 24hrs VS reviewed since prior progress note.  Most recent are:  Patient Vitals for the past 24 hrs:   Temp Pulse Resp BP SpO2   22 1433 -- 91 19 -- 97 %   22 1400 -- 89 12 111/60 97 %   22 1300 -- 95 22 107/69 95 %   22 1200 -- 99 21 103/76 97 %   22 1100 98.4 °F (36.9 °C) (!) 101 17 (!) 113/58 97 %   22 1000 -- 98 21 119/81 95 %   22 0900 -- 99 14 118/81 97 %   22 0800 98.8 °F (37.1 °C) 91 18 (!) 99/55 96 %   22 0400 98.9 °F (37.2 °C) 92 15 92/65 96 %   22 0000 -- (!) 121 23 (!) 99/58 94 %   22 2217 99.4 °F (37.4 °C) (!) 118 17 (!) 101/58 95 %   22 2200 99.6 °F (37.6 °C) (!) 124 20 (!) 97/59 97 %   22 -- (!) 118 18 109/60 98 %   22 -- (!) 113 19 (!) 98/53 98 %   22 -- (!) 114 17 (!) 91/55 97 %   05/04/22 2120 (!) 100.8 °F (38.2 °C) (!) 119 17 (!) 93/49 96 %   05/04/22 2115 -- (!) 117 18 (!) 91/51 96 %   05/04/22 2110 -- (!) 119 19 (!) 94/44 95 %   05/04/22 2107 (!) 101.6 °F (38.7 °C) (!) 121 12 (!) 91/51 99 %   05/04/22 2105 -- (!) 121 19 (!) 91/51 95 %   05/04/22 2100 -- (!) 120 20 (!) 96/55 95 %   05/04/22 2055 -- (!) 123 22 (!) 87/46 96 %   05/04/22 1951 (!) 102.4 °F (39.1 °C) (!) 136 18 120/80 95 %   05/04/22 1850 -- (!) 141 11 -- 93 %   05/04/22 1835 -- (!) 125 27 -- 100 %   05/04/22 1820 -- (!) 139 28 112/79 100 %   05/04/22 1805 -- (!) 126 22 130/80 97 %   05/04/22 1756 (!) 102.1 °F (38.9 °C) (!) 135 28 130/88 97 %   05/04/22 1750 -- -- -- 130/88 96 %       Intake/Output Summary (Last 24 hours) at 5/5/2022 1435  Last data filed at 5/5/2022 1433  Gross per 24 hour   Intake 2436.5 ml   Output 4850 ml   Net -2413.5 ml        PHYSICAL EXAM:  General: WD, WN. Alert, cooperative, no acute distress    EENT:  EOMI. Anicteric sclerae. MMM  Resp:  CTA bilaterally, no wheezing or rales. No accessory muscle use  CV:  Regular  rhythm,  No edema  GI:  Soft, left flank tenderness, +Bowel sounds  Neurologic:  Alert and oriented X 3, normal speech,   Psych:   Good insight. Not anxious nor agitated  Skin:  No rashes.   No jaundice    Reviewed most current lab test results and cultures  YES  Reviewed most current radiology test results   YES  Review and summation of old records today    NO  Reviewed patient's current orders and MAR    YES  PMH/SH reviewed - no change compared to H&P          Current Facility-Administered Medications:     HYDROmorphone (DILAUDID) injection 1 mg, 1 mg, IntraVENous, Q4H PRN, Brant Osullivan MD, 1 mg at 05/05/22 1433    sodium chloride (NS) flush 5-10 mL, 5-10 mL, IntraVENous, PRN, Melissa Mendoza MD    0.9% sodium chloride infusion, 75 mL/hr, IntraVENous, CONTINUOUS, Lorelei Jorgensen MD, Last Rate: 75 mL/hr at 05/04/22 2238, 75 mL/hr at 05/04/22 2238    sodium chloride (NS) flush 5-40 mL, 5-40 mL, IntraVENous, Q8H, Krystle Owens MD, 10 mL at 05/05/22 0641    sodium chloride (NS) flush 5-40 mL, 5-40 mL, IntraVENous, PRN, Krystle Owens MD    acetaminophen (TYLENOL) tablet 650 mg, 650 mg, Oral, Q6H PRN **OR** acetaminophen (TYLENOL) suppository 650 mg, 650 mg, Rectal, Q6H PRN, Krystle Owens MD    polyethylene glycol (MIRALAX) packet 17 g, 17 g, Oral, DAILY PRN, Krystle Owens MD    ondansetron (ZOFRAN ODT) tablet 4 mg, 4 mg, Oral, Q8H PRN **OR** ondansetron (ZOFRAN) injection 4 mg, 4 mg, IntraVENous, Q6H PRN, Krystle Owens MD    cefTRIAXone (ROCEPHIN) 1 g in 0.9% sodium chloride (MBP/ADV) 50 mL MBP, 1 g, IntraVENous, Q24H, Krystle Owens MD  ________________________________________________________________________  Care Plan discussed with:    Comments   Patient y    Family      RN y    Care Manager     Consultant                        Multidiciplinary team rounds were held today with , nursing, pharmacist and clinical coordinator. Patient's plan of care was discussed; medications were reviewed and discharge planning was addressed. ________________________________________________________________________  Total NON critical care TIME: 35  Minutes    Total CRITICAL CARE TIME Spent:   Minutes non procedure based      Comments   >50% of visit spent in counseling and coordination of care     ________________________________________________________________________  Giuseppe Cannon MD     Procedures: see electronic medical records for all procedures/Xrays and details which were not copied into this note but were reviewed prior to creation of Plan. LABS:  I reviewed today's most current labs and imaging studies.   Pertinent labs include:  Recent Labs     05/05/22  0424 05/04/22  1946 05/04/22  1100   WBC 32.3* 16.6* 12.4*   HGB 11.4* 11.8 13.7   HCT 36.4 36.6 41.3    186 290     Recent Labs 05/05/22  0424 05/04/22  1829 05/04/22  1100    135* 139   K 4.5 3.9 4.0   * 104 105   CO2 22 23 21   * 95 122*   BUN 14 15 14   CREA 1.13* 1.28* 0.98   CA 7.8* 8.0* 8.3*   ALB  --  3.5 3.7   TBILI  --  0.7 0.2   ALT  --  53 57       Signed: Mortimer Rusk, MD

## 2022-05-05 NOTE — PERIOP NOTES
TRANSFER - OUT REPORT:    Verbal report given to Jayashree RN(name) on [facility]  being transferred to [facility](unit) for routine post - op       Report consisted of patients Situation, Background, Assessment and   Recommendations(SBAR). Information from the following report(s) SBAR, Kardex, OR Summary, Intake/Output, MAR and Cardiac Rhythm ST was reviewed with the receiving nurse. Opportunity for questions and clarification was provided.       Patient transported with:   Monitor  Registered Nurse

## 2022-05-05 NOTE — PROGRESS NOTES
Progress Note    Patient: Lexii Juarez MRN: [id number]  SSN: [id number]    YOB: 1990  Age: 33 y.o. Sex: female        ADMITTED:  2022 to Deidra Smith MD  for [facility] [A41.9, R65.20]  UTI (urinary tract infection) [N39.0]  Urolithiasis [N20.9]         Lexii Juarez is 1 Day Post-Op Procedure(s):  CYSTOSCOPY, LEFT  URETERAL STENT INSERTION. She is doing well. She has moderate pain. She has no nausea. She is tolerating a solid diet. Patient is voiding without difficulty. Voiding in purwick, urine clear yellow. Reports moderate stent colic, has a hx of stent colic in the past. Currently managed on pain regime. Still with dysuria. Generalized weakness improved. Till hypotensive this morning however heart rate normalizing and is now afebrile. TMAx last night 102. 4. on rocephin. Creat normalized. WBC bump to 32.3. Bcx prelim GNR. Urine cx still pending. Vitals:  Temp (24hrs), Av.9 °F (37.7 °C), Min:97.5 °F (36.4 °C), Max:102.4 °F (39.1 °C)     Blood pressure 92/65, pulse 92, temperature 98.9 °F (37.2 °C), resp. rate 15, height 5' 1\" (1.549 m), weight 115.7 kg (255 lb), SpO2 96 %.       I&O's:  1901 -  07  In: 2436.5 [I.V.:2436.5]  Out: 2800 [Urine:2800]    07 -  190  In: -   Out: 450 [Urine:450]     Exam:   abdomen soft, obese  Alert and oriented  Aerating well, no resp distress  Generalized weakness improved  Urine clear yellow     Labs:   Recent Labs     22  0424 22  1946 22  1100   WBC 32.3* 16.6* 12.4*   HGB 11.4* 11.8 13.7   HCT 36.4 36.6 41.3    186 290     Recent Labs     22  0424 22  1829 22  1100    135* 139   K 4.5 3.9 4.0   * 104 105   CO2 22 23 21   * 95 122*   BUN 14 15 14   CREA 1.13* 1.28* 0.98   CA 7.8* 8.0* 8.3*        Cultures:      Imaging:       Assessment:     - 1 Day Post-Op Procedure(s):  CYSTOSCOPY, LEFT  URETERAL STENT INSERTION    Active Problems:    Urolithiasis (5/4/2022)      UTI (urinary tract infection) (5/4/2022)      Severe sepsis ([address] (5/4/2022)        Plan:     - discussed with pt need for definitive stone treatment and stent removal. Follow up arranged.   -follow cultures, target abx therapy once able.   -pain management of stent colic  -call if needed, will sign off    Supervising Dr. Kimberlee DELACRUZ By: Remberto Umana NP - May 5, 2022

## 2022-05-05 NOTE — ED NOTES
Rocephin 1 gm in 50ml pulled from pyxis, primed and given to OR nurses that state they will start it in the OR.

## 2022-05-05 NOTE — PROGRESS NOTES
Transition of Care Plan:    RUR: 10% - low risk  Disposition: Home  Follow up appointments: Specialist. Pt does not have a PCP and declined CM assistance. DME needed: No needs identified. Has crutches at home. Transportation at Discharge: shirley Andersonprudence Nikki or means to access home:  Pedro Gilmore has access. IM Medicare Letter: N/A  Is patient a BCPI-A Bundle: No          If yes, was Bundle Letter given?: No   Is patient a [place] and connected with the [place]? No               If yes, was [employer] transfer form completed and VA notified? Caregiver Contact: Fani Kimmyshirley, [phone]  Discharge Caregiver contacted prior to discharge? CM to contact prior to discharge. Care Conference needed?: Not indicated at this time. Reason for Admission:  Severe sepsis due to UTI, obstructing ureteral stone, and mild hydroureteronephrosis. RUR Score:   10% - low risk                  Plan for utilizing home health:    Not at this time. PCP: First and Last name:  No PCP. Pt declined for CM to assist. States she is looking and will arrange. Name of Practice:    Are you a current patient: Yes/No:    Approximate date of last visit:    Can you participate in a virtual visit with your PCP:                     Current Advanced Directive/Advance Care Plan: Full Code   Advance Care Planning     General Advance Care Planning (ACP) Conversation      Date of Conversation: 5/5/2022  Conducted with: Patient with Sania 153:     Supplemental (Other) Decision Maker: Sidney Levine Dohjvir - [phone]  Click here to complete [address] including selection of the Healthcare Decision Maker Relationship (ie \"Primary\")      Today we discussed decision makers. Pt states it would be shirley Jean Baptiste.     Content/Action Overview:   Has NO ACP documents/care preferences - information provided, considering goals and options  Reviewed DNR/DNI and patient elects Full Code (Attempt Resuscitation)    Length of Voluntary ACP Conversation in minutes:  <16 minutes (Non-Billable)    Healthcare Decision Maker:       Supplemental (Other) Decision Maker: Haider Olivire - Life Partner - [phone]                  Transition of Care Plan:  Home                    CM met with patient at the bedside to complete initial assessment. CM introduced role, verified demographics, and discussed discharge planning. Ms. Thierry Maria is a 33year old female admitted for severe sepsis due to UTI, obstructing ureteral stone, and mild hydroureteronephrosis. Pt is insured with [employer]. She uses the [address] [place] [address] [facility] in [place]. Pt lives with her fiance' and 3 children in a 1-story house with 5 steps to enter. Pt is independent with ADLs and IADLs. Pt drives. Pt denies hx of HH, SNF, or Inpt Rehab. At time of discharge, pt's shirley will transport her home. Unit CM will continue to follow. Care Management Interventions  PCP Verified by CM: Yes (No PCP. Pt declined for CM to assist. )  Mode of Transport at Discharge: Other (see comment) (fiance')  Transition of Care Consult (CM Consult): Discharge Planning  Discharge Durable Medical Equipment: No  Physical Therapy Consult: No  Occupational Therapy Consult: No  Speech Therapy Consult: No  Support Systems: Spouse/Significant Other  Confirm Follow Up Transport: Family  The Plan for Transition of Care is Related to the Following Treatment Goals : Home  Discharge Location  Patient Expects to be Discharged to[de-identified] Home    Reji Dunham RN, BSN, [address] [place] [address] Care Manager  [phone]      .

## 2022-05-05 NOTE — PERIOP NOTES
Handoff Report from Operating Room to PACU    Report received from [facility]  and Joshua Wynne CRNA regarding Mamie Handing. Surgeon(s):  Ron Jennings MD  And Procedure(s) (LRB):  CYSTOSCOPY, LEFT  URETERAL STENT INSERTION (Left)  confirmed   and discussed. Anesthesia type, drugs, patient history, complications, estimated blood loss, vital signs, intake and output, and last pain medication were reviewed.

## 2022-05-05 NOTE — BRIEF OP NOTE
Brief Postoperative Note    Patient: Wilver Gates  YOB: 1990  MRN: [id number]    Date of Procedure: 5/4/2022     Pre-Op Diagnosis: OBSTRUCTING LEFT KIDNEY STONE, UTI    Post-Op Diagnosis: SAME, plus cystocele      Procedure(s):  CYSTOSCOPY, LEFT  URETERAL STENT INSERTION    Surgeon(s):  Allison Spencer MD    Surgical Assistant: None    Anesthesia: General     Estimated Blood Loss (mL): 0    Complications: None    Specimens: urine for culture    Implants:   Implant Name Type Inv.  Item Serial No.  Lot No. LRB No. Used Action   STENT URET DBL-PGTL [id number] -- PERCUFLEX - SNA  STENT URET DBL-PGTL [id number] -- PERCUFLEX NA [employer]_ [id number] Left 1 Implanted       Drains:   Nephroureteral Drain 05/04/22 Left Ureter (Active)       Findings: cystocele    Electronically Signed by Judy Wynne MD on 5/4/2022 at 8:43 PM

## 2022-05-06 LAB
ANION GAP SERPL CALC-SCNC: 5 MMOL/L (ref 5–15)
BACTERIA SPEC CULT: NORMAL
BACTERIA SPEC CULT: NORMAL
BUN SERPL-MCNC: 14 MG/DL (ref 6–20)
BUN/CREAT SERPL: 16 (ref 12–20)
CALCIUM SERPL-MCNC: 8.3 MG/DL (ref 8.5–10.1)
CHLORIDE SERPL-SCNC: 108 MMOL/L (ref 97–108)
CO2 SERPL-SCNC: 25 MMOL/L (ref 21–32)
CREAT SERPL-MCNC: 0.85 MG/DL (ref 0.55–1.02)
ERYTHROCYTE [DISTWIDTH] IN BLOOD BY AUTOMATED COUNT: 15.9 % (ref 11.5–14.5)
GLUCOSE SERPL-MCNC: 104 MG/DL (ref 65–100)
HCT VFR BLD AUTO: 35.8 % (ref 35–47)
HGB BLD-MCNC: 11.4 G/DL (ref 11.5–16)
LACTATE SERPL-SCNC: 1.8 MMOL/L (ref 0.4–2)
MCH RBC QN AUTO: 28.1 PG (ref 26–34)
MCHC RBC AUTO-ENTMCNC: 31.8 G/DL (ref 30–36.5)
MCV RBC AUTO: 88.4 FL (ref 80–99)
NRBC # BLD: 0 K/UL (ref 0–0.01)
NRBC BLD-RTO: 0 PER 100 WBC
PLATELET # BLD AUTO: 167 K/UL (ref 150–400)
PMV BLD AUTO: 10.3 FL (ref 8.9–12.9)
POTASSIUM SERPL-SCNC: 3.5 MMOL/L (ref 3.5–5.1)
RBC # BLD AUTO: 4.05 M/UL (ref 3.8–5.2)
SERVICE CMNT-IMP: NORMAL
SODIUM SERPL-SCNC: 138 MMOL/L (ref 136–145)
WBC # BLD AUTO: 15.4 K/UL (ref 3.6–11)

## 2022-05-06 PROCEDURE — 74011000250 HC RX REV CODE- 250: Performed by: STUDENT IN AN ORGANIZED HEALTH CARE EDUCATION/TRAINING PROGRAM

## 2022-05-06 PROCEDURE — 36415 COLL VENOUS BLD VENIPUNCTURE: CPT

## 2022-05-06 PROCEDURE — 65270000046 HC RM TELEMETRY

## 2022-05-06 PROCEDURE — 74011250636 HC RX REV CODE- 250/636: Performed by: INTERNAL MEDICINE

## 2022-05-06 PROCEDURE — 74011250636 HC RX REV CODE- 250/636: Performed by: STUDENT IN AN ORGANIZED HEALTH CARE EDUCATION/TRAINING PROGRAM

## 2022-05-06 PROCEDURE — 74011250637 HC RX REV CODE- 250/637: Performed by: INTERNAL MEDICINE

## 2022-05-06 PROCEDURE — 85027 COMPLETE CBC AUTOMATED: CPT

## 2022-05-06 PROCEDURE — 74011250637 HC RX REV CODE- 250/637: Performed by: STUDENT IN AN ORGANIZED HEALTH CARE EDUCATION/TRAINING PROGRAM

## 2022-05-06 PROCEDURE — 83605 ASSAY OF LACTIC ACID: CPT

## 2022-05-06 PROCEDURE — 80048 BASIC METABOLIC PNL TOTAL CA: CPT

## 2022-05-06 PROCEDURE — 74011000258 HC RX REV CODE- 258: Performed by: INTERNAL MEDICINE

## 2022-05-06 PROCEDURE — 87040 BLOOD CULTURE FOR BACTERIA: CPT

## 2022-05-06 RX ORDER — ENOXAPARIN SODIUM 100 MG/ML
30 INJECTION SUBCUTANEOUS EVERY 12 HOURS
Status: DISCONTINUED | OUTPATIENT
Start: 2022-05-06 | End: 2022-05-11 | Stop reason: HOSPADM

## 2022-05-06 RX ORDER — IBUPROFEN 400 MG/1
400 TABLET ORAL
Status: DISCONTINUED | OUTPATIENT
Start: 2022-05-06 | End: 2022-05-10

## 2022-05-06 RX ADMIN — HYDROMORPHONE HYDROCHLORIDE 1 MG: 1 INJECTION, SOLUTION INTRAMUSCULAR; INTRAVENOUS; SUBCUTANEOUS at 10:45

## 2022-05-06 RX ADMIN — HYDROMORPHONE HYDROCHLORIDE 1 MG: 1 INJECTION, SOLUTION INTRAMUSCULAR; INTRAVENOUS; SUBCUTANEOUS at 02:43

## 2022-05-06 RX ADMIN — PIPERACILLIN AND TAZOBACTAM 4.5 G: 4; .5 INJECTION, POWDER, LYOPHILIZED, FOR SOLUTION INTRAVENOUS at 10:01

## 2022-05-06 RX ADMIN — TAMSULOSIN HYDROCHLORIDE 0.4 MG: 0.4 CAPSULE ORAL at 08:41

## 2022-05-06 RX ADMIN — SODIUM CHLORIDE, PRESERVATIVE FREE 10 ML: 5 INJECTION INTRAVENOUS at 13:50

## 2022-05-06 RX ADMIN — HYDROMORPHONE HYDROCHLORIDE 1 MG: 1 INJECTION, SOLUTION INTRAMUSCULAR; INTRAVENOUS; SUBCUTANEOUS at 06:42

## 2022-05-06 RX ADMIN — IBUPROFEN 400 MG: 400 TABLET, FILM COATED ORAL at 13:49

## 2022-05-06 RX ADMIN — ACETAMINOPHEN 325MG 650 MG: 325 TABLET ORAL at 19:40

## 2022-05-06 RX ADMIN — ACETAMINOPHEN 325MG 650 MG: 325 TABLET ORAL at 06:35

## 2022-05-06 RX ADMIN — HYDROMORPHONE HYDROCHLORIDE 1 MG: 1 INJECTION, SOLUTION INTRAMUSCULAR; INTRAVENOUS; SUBCUTANEOUS at 15:02

## 2022-05-06 RX ADMIN — SODIUM CHLORIDE, PRESERVATIVE FREE 10 ML: 5 INJECTION INTRAVENOUS at 21:36

## 2022-05-06 RX ADMIN — PIPERACILLIN AND TAZOBACTAM 3.38 G: 3; .375 INJECTION, POWDER, LYOPHILIZED, FOR SOLUTION INTRAVENOUS at 16:20

## 2022-05-06 RX ADMIN — ACETAMINOPHEN 325MG 650 MG: 325 TABLET ORAL at 12:14

## 2022-05-06 RX ADMIN — SODIUM CHLORIDE, PRESERVATIVE FREE 10 ML: 5 INJECTION INTRAVENOUS at 06:36

## 2022-05-06 RX ADMIN — ENOXAPARIN SODIUM 30 MG: 100 INJECTION SUBCUTANEOUS at 16:20

## 2022-05-06 RX ADMIN — SODIUM CHLORIDE 75 ML/HR: 9 INJECTION, SOLUTION INTRAVENOUS at 00:40

## 2022-05-06 RX ADMIN — HYDROMORPHONE HYDROCHLORIDE 1 MG: 1 INJECTION, SOLUTION INTRAMUSCULAR; INTRAVENOUS; SUBCUTANEOUS at 19:40

## 2022-05-06 RX ADMIN — IBUPROFEN 400 MG: 400 TABLET, FILM COATED ORAL at 21:34

## 2022-05-06 NOTE — PROGRESS NOTES
1900: Bedside shift change report given to Devyn Wilkins (oncoming nurse) by Sabrina Martino (offgoing nurse). Report included the following information SBAR, Kardex, ED Summary, Intake/Output, MAR, Recent Results and Cardiac Rhythm NSR. Patient complaining of IV hurting. 2000: Called L&D for IV placement but unable to come. Called ED for IV placement, will send somebody. 2020: Patient complaining of headache 3/10 pain. Offered tylenol but refuse and wants advil in her purse instead, but unable to give her due to policy. Educated her she cannot take home medicine without doctors order. [facility] NP Tonia Pena. 2027: ED Tech in the room. New midline IV obtained. 2041Joneen Ganser NP declines order for advil due to OLI. Explained to patient but patient appears agitated. Offered norco and tylenol but patient refused. 2130: Patient complaining of headache 3/10 pain again, accepted tylenol. End of Shift Note    Bedside shift change report given to 1500  Place (oncoming nurse) by Massimo Hardy RN (offgoing nurse). Report included the following information SBAR, Kardex, ED Summary, Intake/Output, MAR, Recent Results and Cardiac Rhythm NSR/ST    Shift worked:  [id number]     Shift summary and any significant changes:     Continues to complain of pain, oxycodone and tylenol given. SEE MAR. Patient refuse norco because it causes her to have adverse reactions. Concerns for physician to address:  prefers ibuprofen over tylenol, better pain control. tx?      Zone phone for oncoming shift:          Activity:  Activity Level: Bed Rest  Number times ambulated in hallways past shift: 0  Number of times OOB to chair past shift: 0    Cardiac:   Cardiac Monitoring: Yes      Cardiac Rhythm: Sinus Tachy    Access:   Current line(s): PIV and midline     Genitourinary:   Urinary status: voiding    Respiratory:   O2 Device: None (Room air)  Chronic home O2 use?: NO  Incentive spirometer at bedside: NO       GI:  Last Bowel Movement Date:  (PTA)  Current diet:  ADULT DIET Regular  Passing flatus: YES  Tolerating current diet: YES       Pain Management:   Patient states pain is manageable on current regimen: YES    Skin:  Gómez Score: 22  Interventions: float heels, increase time out of bed, PT/OT consult and internal/external urinary devices    Patient Safety:  Fall Score:  Total Score: 2  Interventions: bed/chair alarm, assistive device (walker, cane, etc), gripper socks, pt to call before getting OOB and stay with me (per policy)  High Fall Risk: Yes    Length of Stay:  Expected LOS: - - -  Actual LOS: 2      Janet Hernandez RN

## 2022-05-06 NOTE — PROGRESS NOTES
Transition of Care Plan:     RUR: 10% - low risk  Disposition: Home vs Home with [facility]  Follow up appointments: Specialist. Pt does not have a PCP and declined CM assistance. DME needed: No needs identified. Has crutches at home. Transportation at Discharge: shirley Palma Gimado or means to access home:  Cornell Fitting' has access. IM Medicare Letter: N/A  Is patient a BCPI-A Bundle: No                     If yes, was Bundle Letter given?: No   Is patient a  and [facility]? No               If yes, was [employer] transfer form completed and VA notified? Caregiver Contact: shirley Rodriguez, [phone]  Discharge Caregiver contacted prior to discharge? CM to contact prior to discharge. Care Conference needed?: Not indicated at this time. Initial Note 09:40 am: In review of chart, pt is not medically stable for discharge. Barriers: cultures. Unit CM will continue to follow.      Zoe Dang, RN, BSN, [address] [place] [address] Care Manager  [phone]

## 2022-05-06 NOTE — CONSULTS
Called up from the ED to gain additional access for this patient 20ga endurance catheter placed in the left forearm under guidance of ultrasound. Line confirmed with colorimetric flow under ultrasound at 2027.      Blessing Washburn NRP

## 2022-05-06 NOTE — PROGRESS NOTES
Hospitalist Progress Note    NAME: Salo Lobato   :  1990   MRN:  [id number]       Assessment / Plan:  Left-sided hydronephrosis due to nephrolithiasis s/p cystoscopy and stent placement  Severe sepsis  Gram-negative kayli bacteremia  -S/p cystoscopy with stent placement  -Blood cultures are growing gram-negative bacteremia.   -She continues to have fever in spite of ceftriaxone will change antibiotics to Zosyn. We will repeat blood cultures this evening after giving 2 doses of Zosyn.  -Follow final urine and blood culture results. Adjust antibiotics  -Continue Dilaudid and Norco for pain control. Continue Flomax  -We will add ibuprofen for fever as well  -Increase IV fluids to 125 mill per hour  -Check lactic acid  -Check CBC and BMP in a.m. tomorrow          Body mass index is 49.9 kg/m². Code status: Full  Prophylaxis: Lovenox  Recommended Disposition: Home w/Family     Subjective:     Chief Complaint / Reason for Physician Visit  She continues to have fever in spite of ceftriaxone  Reports headache  No abdominal pain, mild nausea but no vomiting      Review of Systems:  Symptom Y/N Comments  Symptom Y/N Comments   Fever/Chills    Chest Pain     Poor Appetite    Edema     Cough    Abdominal Pain     Sputum    Joint Pain     SOB/HAMMOND    Pruritis/Rash     Nausea/vomit    Tolerating PT/OT     Diarrhea    Tolerating Diet     Constipation    Other       Could NOT obtain due to:      Objective:     VITALS:   Last 24hrs VS reviewed since prior progress note.  Most recent are:  Patient Vitals for the past 24 hrs:   Temp Pulse Resp BP SpO2   22 1026 -- (!) 103 22 121/80 97 %   22 1021 99.9 °F (37.7 °C) 98 19 121/80 99 %   22 0844 99.2 °F (37.3 °C) (!) 104 20 106/68 93 %   22 0642 (!) 102 °F (38.9 °C) (!) 118 20 (!) 150/99 97 %   22 0255 99.5 °F (37.5 °C) 100 17 108/71 95 %   22 0000 -- (!) 116 24 -- 96 %   22 2306 98.3 °F (36.8 °C) (!) 111 14 121/62 96 %   22 2000 -- (!) 101 26 -- --   05/05/22 1935 98.4 °F (36.9 °C) (!) 103 10 122/72 97 %   05/05/22 1800 -- (!) 102 25 117/71 95 %   05/05/22 1700 -- (!) 112 21 102/65 94 %   05/05/22 1600 -- (!) 105 14 101/74 97 %   05/05/22 1500 -- (!) 110 20 114/70 96 %   05/05/22 1433 -- 91 19 -- 97 %       Intake/Output Summary (Last 24 hours) at 5/6/2022 1429  Last data filed at 5/6/2022 0255  Gross per 24 hour   Intake 647.5 ml   Output 900 ml   Net -252.5 ml        PHYSICAL EXAM:  General: WD, WN. Alert, cooperative, no acute distress    EENT:  EOMI. Anicteric sclerae. MMM  Resp:  CTA bilaterally, no wheezing or rales. No accessory muscle use  CV:  Regular  rhythm,  No edema  GI:  Soft, left flank tenderness, +Bowel sounds  Neurologic:  Alert and oriented X 3, normal speech,   Psych:   Good insight. Not anxious nor agitated  Skin:  No rashes.   No jaundice    Reviewed most current lab test results and cultures  YES  Reviewed most current radiology test results   YES  Review and summation of old records today    NO  Reviewed patient's current orders and MAR    YES  PMH/SH reviewed - no change compared to H&P          Current Facility-Administered Medications:     [COMPLETED] piperacillin-tazobactam (ZOSYN) 4.5 g in 0.9% sodium chloride (MBP/ADV) 100 mL MBP, 4.5 g, IntraVENous, ONCE, Last Rate: 200 mL/hr at 05/06/22 1001, 4.5 g at 05/06/22 1001 **FOLLOWED BY** piperacillin-tazobactam (ZOSYN) 3.375 g in 0.9% sodium chloride (MBP/ADV) 100 mL MBP, 3.375 g, IntraVENous, Q8H, Brant Fuller MD    enoxaparin (LOVENOX) injection 30 mg, 30 mg, SubCUTAneous, Q12H, Brant Fuller MD    ibuprofen (MOTRIN) tablet 400 mg, 400 mg, Oral, Q6H PRN, Janiece Cabot, Varun K, MD, 400 mg at 05/06/22 1349    HYDROmorphone (DILAUDID) injection 1 mg, 1 mg, IntraVENous, Q4H PRN, Janiece Cabot, Varun K, MD, 1 mg at 05/06/22 1045    tamsulosin (FLOMAX) capsule 0.4 mg, 0.4 mg, Oral, DAILY, Brant Fuller MD, 0.4 mg at 05/06/22 0841    HYDROcodone-acetaminophen ([facility]) 5-325 mg per tablet 1 Tablet, 1 Tablet, Oral, Q6H PRN, Ashwin Michaels MD    0.9% sodium chloride infusion, 125 mL/hr, IntraVENous, CONTINUOUS, Jesus Marinelli MD, Last Rate: 125 mL/hr at 05/06/22 1348, 125 mL/hr at 05/06/22 1348    sodium chloride (NS) flush 5-40 mL, 5-40 mL, IntraVENous, Q8H, Johanne Morales MD, 10 mL at 05/06/22 1350    sodium chloride (NS) flush 5-40 mL, 5-40 mL, IntraVENous, PRN, Johanne Morales MD    acetaminophen (TYLENOL) tablet 650 mg, 650 mg, Oral, Q6H PRN, 650 mg at 05/06/22 1214 **OR** acetaminophen (TYLENOL) suppository 650 mg, 650 mg, Rectal, Q6H PRN, Johanne Morales MD    polyethylene glycol (MIRALAX) packet 17 g, 17 g, Oral, DAILY PRN, Johanne Morales MD    ondansetron (ZOFRAN ODT) tablet 4 mg, 4 mg, Oral, Q8H PRN **OR** ondansetron (ZOFRAN) injection 4 mg, 4 mg, IntraVENous, Q6H PRN, Johanne Morales MD  ________________________________________________________________________  Care Plan discussed with:    Comments   Patient y    Family      RN y    Care Manager     Consultant                        Multidiciplinary team rounds were held today with , nursing, pharmacist and clinical coordinator. Patient's plan of care was discussed; medications were reviewed and discharge planning was addressed. ________________________________________________________________________  Total NON critical care TIME: 35  Minutes    Total CRITICAL CARE TIME Spent:   Minutes non procedure based      Comments   >50% of visit spent in counseling and coordination of care     ________________________________________________________________________  Tamica Armenta MD     Procedures: see electronic medical records for all procedures/Xrays and details which were not copied into this note but were reviewed prior to creation of Plan. LABS:  I reviewed today's most current labs and imaging studies.   Pertinent labs include:  Recent Labs     05/06/22  0249 05/05/22  0424 05/04/22 1946   WBC 15.4* 32.3* 16.6*   HGB 11.4* 11.4* 11.8   HCT 35.8 36.4 36.6    191 186     Recent Labs     05/06/22  0249 05/05/22  0424 05/04/22  1829 05/04/22  1100 05/04/22  1100    139 135*   < > 139   K 3.5 4.5 3.9   < > 4.0    110* 104   < > 105   CO2 25 22 23   < > 21   * 147* 95   < > 122*   BUN 14 14 15   < > 14   CREA 0.85 1.13* 1.28*   < > 0.98   CA 8.3* 7.8* 8.0*   < > 8.3*   ALB  --   --  3.5  --  3.7   TBILI  --   --  0.7  --  0.2   ALT  --   --  53  --  57    < > = values in this interval not displayed.        Signed: Zehra Salinas MD

## 2022-05-06 NOTE — PROGRESS NOTES
Shift report    1115 pt c/o chills, temp 102.7. Dr Julianne Onofre made aware, tylenol given  1320 temp 103.1. MD ordered ibuprofen, IV fluids increased and lab  Urology notified of fevers and meds given. No new orders given. 1500 patient resting in bed, verbalizes feeling better. Per Dr. Julianne Onofre, blood cultures to be drawn after 2nd dose of zosyn is given. Endorsed to coming shift.

## 2022-05-07 ENCOUNTER — APPOINTMENT (OUTPATIENT)
Dept: CT IMAGING | Age: 32
DRG: 720 | End: 2022-05-07
Attending: INTERNAL MEDICINE
Payer: MEDICAID

## 2022-05-07 LAB
ANION GAP SERPL CALC-SCNC: 5 MMOL/L (ref 5–15)
BACTERIA SPEC CULT: ABNORMAL
BUN SERPL-MCNC: 9 MG/DL (ref 6–20)
BUN/CREAT SERPL: 11 (ref 12–20)
CALCIUM SERPL-MCNC: 8 MG/DL (ref 8.5–10.1)
CC UR VC: ABNORMAL
CHLORIDE SERPL-SCNC: 109 MMOL/L (ref 97–108)
CO2 SERPL-SCNC: 23 MMOL/L (ref 21–32)
CREAT SERPL-MCNC: 0.84 MG/DL (ref 0.55–1.02)
ERYTHROCYTE [DISTWIDTH] IN BLOOD BY AUTOMATED COUNT: 15.6 % (ref 11.5–14.5)
GLUCOSE SERPL-MCNC: 120 MG/DL (ref 65–100)
HCT VFR BLD AUTO: 35.2 % (ref 35–47)
HGB BLD-MCNC: 11.2 G/DL (ref 11.5–16)
MCH RBC QN AUTO: 27.9 PG (ref 26–34)
MCHC RBC AUTO-ENTMCNC: 31.8 G/DL (ref 30–36.5)
MCV RBC AUTO: 87.6 FL (ref 80–99)
NRBC # BLD: 0 K/UL (ref 0–0.01)
NRBC BLD-RTO: 0 PER 100 WBC
PLATELET # BLD AUTO: 147 K/UL (ref 150–400)
PMV BLD AUTO: 10.1 FL (ref 8.9–12.9)
POTASSIUM SERPL-SCNC: 3.3 MMOL/L (ref 3.5–5.1)
RBC # BLD AUTO: 4.02 M/UL (ref 3.8–5.2)
SERVICE CMNT-IMP: ABNORMAL
SODIUM SERPL-SCNC: 137 MMOL/L (ref 136–145)
WBC # BLD AUTO: 5.7 K/UL (ref 3.6–11)

## 2022-05-07 PROCEDURE — 74011250637 HC RX REV CODE- 250/637: Performed by: INTERNAL MEDICINE

## 2022-05-07 PROCEDURE — 74011250636 HC RX REV CODE- 250/636: Performed by: INTERNAL MEDICINE

## 2022-05-07 PROCEDURE — 74011250636 HC RX REV CODE- 250/636: Performed by: UROLOGY

## 2022-05-07 PROCEDURE — 76937 US GUIDE VASCULAR ACCESS: CPT

## 2022-05-07 PROCEDURE — 74011250636 HC RX REV CODE- 250/636: Performed by: STUDENT IN AN ORGANIZED HEALTH CARE EDUCATION/TRAINING PROGRAM

## 2022-05-07 PROCEDURE — 74011250637 HC RX REV CODE- 250/637

## 2022-05-07 PROCEDURE — 85027 COMPLETE CBC AUTOMATED: CPT

## 2022-05-07 PROCEDURE — 36415 COLL VENOUS BLD VENIPUNCTURE: CPT

## 2022-05-07 PROCEDURE — 74011000258 HC RX REV CODE- 258: Performed by: INTERNAL MEDICINE

## 2022-05-07 PROCEDURE — 65270000046 HC RM TELEMETRY

## 2022-05-07 PROCEDURE — 80048 BASIC METABOLIC PNL TOTAL CA: CPT

## 2022-05-07 PROCEDURE — 74011000250 HC RX REV CODE- 250: Performed by: STUDENT IN AN ORGANIZED HEALTH CARE EDUCATION/TRAINING PROGRAM

## 2022-05-07 PROCEDURE — 74176 CT ABD & PELVIS W/O CONTRAST: CPT

## 2022-05-07 RX ORDER — KETOROLAC TROMETHAMINE 30 MG/ML
30 INJECTION, SOLUTION INTRAMUSCULAR; INTRAVENOUS
Status: COMPLETED | OUTPATIENT
Start: 2022-05-07 | End: 2022-05-07

## 2022-05-07 RX ORDER — POTASSIUM CHLORIDE 750 MG/1
40 TABLET, FILM COATED, EXTENDED RELEASE ORAL
Status: COMPLETED | OUTPATIENT
Start: 2022-05-07 | End: 2022-05-07

## 2022-05-07 RX ORDER — POTASSIUM CHLORIDE 20 MEQ/1
40 TABLET, EXTENDED RELEASE ORAL ONCE
Status: COMPLETED | OUTPATIENT
Start: 2022-05-07 | End: 2022-05-07

## 2022-05-07 RX ORDER — BUTALBITAL, ACETAMINOPHEN AND CAFFEINE 50; 325; 40 MG/1; MG/1; MG/1
1 TABLET ORAL
Status: DISCONTINUED | OUTPATIENT
Start: 2022-05-07 | End: 2022-05-10

## 2022-05-07 RX ADMIN — SODIUM CHLORIDE, PRESERVATIVE FREE 10 ML: 5 INJECTION INTRAVENOUS at 05:27

## 2022-05-07 RX ADMIN — PIPERACILLIN AND TAZOBACTAM 3.38 G: 3; .375 INJECTION, POWDER, LYOPHILIZED, FOR SOLUTION INTRAVENOUS at 23:54

## 2022-05-07 RX ADMIN — SODIUM CHLORIDE 125 ML/HR: 9 INJECTION, SOLUTION INTRAVENOUS at 05:26

## 2022-05-07 RX ADMIN — HYDROMORPHONE HYDROCHLORIDE 1 MG: 1 INJECTION, SOLUTION INTRAMUSCULAR; INTRAVENOUS; SUBCUTANEOUS at 06:27

## 2022-05-07 RX ADMIN — PIPERACILLIN AND TAZOBACTAM 3.38 G: 3; .375 INJECTION, POWDER, LYOPHILIZED, FOR SOLUTION INTRAVENOUS at 08:31

## 2022-05-07 RX ADMIN — HYDROMORPHONE HYDROCHLORIDE 1 MG: 1 INJECTION, SOLUTION INTRAMUSCULAR; INTRAVENOUS; SUBCUTANEOUS at 14:40

## 2022-05-07 RX ADMIN — ENOXAPARIN SODIUM 30 MG: 100 INJECTION SUBCUTANEOUS at 16:48

## 2022-05-07 RX ADMIN — HYDROMORPHONE HYDROCHLORIDE 1 MG: 1 INJECTION, SOLUTION INTRAMUSCULAR; INTRAVENOUS; SUBCUTANEOUS at 10:26

## 2022-05-07 RX ADMIN — HYDROMORPHONE HYDROCHLORIDE 1 MG: 1 INJECTION, SOLUTION INTRAMUSCULAR; INTRAVENOUS; SUBCUTANEOUS at 01:27

## 2022-05-07 RX ADMIN — IBUPROFEN 400 MG: 400 TABLET, FILM COATED ORAL at 03:20

## 2022-05-07 RX ADMIN — TAMSULOSIN HYDROCHLORIDE 0.4 MG: 0.4 CAPSULE ORAL at 08:33

## 2022-05-07 RX ADMIN — PIPERACILLIN AND TAZOBACTAM 3.38 G: 3; .375 INJECTION, POWDER, LYOPHILIZED, FOR SOLUTION INTRAVENOUS at 00:20

## 2022-05-07 RX ADMIN — ONDANSETRON 4 MG: 2 INJECTION INTRAMUSCULAR; INTRAVENOUS at 08:29

## 2022-05-07 RX ADMIN — SODIUM CHLORIDE, PRESERVATIVE FREE 10 ML: 5 INJECTION INTRAVENOUS at 23:54

## 2022-05-07 RX ADMIN — POTASSIUM CHLORIDE 40 MEQ: 750 TABLET, EXTENDED RELEASE ORAL at 12:17

## 2022-05-07 RX ADMIN — ENOXAPARIN SODIUM 30 MG: 100 INJECTION SUBCUTANEOUS at 03:20

## 2022-05-07 RX ADMIN — SODIUM CHLORIDE 125 ML/HR: 9 INJECTION, SOLUTION INTRAVENOUS at 23:54

## 2022-05-07 RX ADMIN — HYDROCODONE BITARTRATE AND ACETAMINOPHEN 1 TABLET: 5; 325 TABLET ORAL at 00:07

## 2022-05-07 RX ADMIN — ONDANSETRON 4 MG: 2 INJECTION INTRAMUSCULAR; INTRAVENOUS at 14:45

## 2022-05-07 RX ADMIN — PIPERACILLIN AND TAZOBACTAM 3.38 G: 3; .375 INJECTION, POWDER, LYOPHILIZED, FOR SOLUTION INTRAVENOUS at 16:48

## 2022-05-07 RX ADMIN — HYDROMORPHONE HYDROCHLORIDE 1 MG: 1 INJECTION, SOLUTION INTRAMUSCULAR; INTRAVENOUS; SUBCUTANEOUS at 18:23

## 2022-05-07 RX ADMIN — BUTALBITAL, ACETAMINOPHEN, AND CAFFEINE 1 TABLET: 50; 325; 40 TABLET ORAL at 19:48

## 2022-05-07 RX ADMIN — HYDROMORPHONE HYDROCHLORIDE 1 MG: 1 INJECTION, SOLUTION INTRAMUSCULAR; INTRAVENOUS; SUBCUTANEOUS at 22:40

## 2022-05-07 RX ADMIN — POTASSIUM CHLORIDE 40 MEQ: 20 TABLET, EXTENDED RELEASE ORAL at 06:19

## 2022-05-07 RX ADMIN — KETOROLAC TROMETHAMINE 30 MG: 30 INJECTION, SOLUTION INTRAMUSCULAR at 12:17

## 2022-05-07 RX ADMIN — SODIUM CHLORIDE, PRESERVATIVE FREE 10 ML: 5 INJECTION INTRAVENOUS at 14:40

## 2022-05-07 RX ADMIN — IBUPROFEN 400 MG: 400 TABLET, FILM COATED ORAL at 08:33

## 2022-05-07 NOTE — PROGRESS NOTES
Endurance Catheter insertion note     Inserted 20 G, 8 cm long  Endurance Extended Dwell Peripheral Catheter into right upper arm using ultrasound guidance and sterile technique. Positive blood return. Patient tolerated procedure well. Denies questions or concerns at this time. The Endurance catheter is an extended dwell peripheral catheter and may remain in place 29 days. Blood samples can be obtained from this catheter. To obtain labs, a tourniquet may be used, flush with 10ml NS, waste 3ml, draw labs, flush with 20ml NS. Dressings needs to be changed with central line dressing kit using bio patch and stat lock every 7 days by nurse. Primary Nurse, [facility] RN notified. Maria De Jesus Avina RN .   Vascular Access Team. PICC Nurse

## 2022-05-07 NOTE — PROGRESS NOTES
End of Shift Note    Bedside shift change report given to RN (oncoming nurse) by Salo Wright RN (offgoing nurse). Report included the following information SBAR, Intake/Output, MAR, Recent Results and Cardiac Rhythm NSR    Shift worked:  1803-2471     Shift summary and any significant changes:     Afebrile throughout shift. Patient requesting PRN pain coverage Q4 hours, even if drowsy/sleeping.  CT Abd/Pelv w/o contrast completed     Concerns for physician to address:       Zone phone for oncoming shift:

## 2022-05-07 NOTE — PROGRESS NOTES
3798- Notified by nursing regarding potassium of 3.3. Repleted with 40 mEq p.o. potassium. Please note that this note was dictated using Dragon computer voice recognition software. Quite often unanticipated grammatical, syntax, homophones, and other interpretive errors are inadvertently transcribed by the computer software. Please disregard these errors. Please excuse any errors that have escaped final proofreading.

## 2022-05-07 NOTE — PROGRESS NOTES
End of Shift Note    Bedside shift change report given to Alejandra Rodrigues RN (oncoming nurse) by Adelina Wolfe RN (offgoing nurse). Report included the following information SBAR    Shift worked:  7p-7a     Shift summary and any significant changes:    2200: Paired blood cultures drawn after 2 doses zosyn    2134: ibuprofen given fever 100.8 & pain    2200: ambulate to bathroom pain with urination    2300: Mothers milk taken to L&D floor    0007: [place] tab given for pain (does not help with pain)     0127: Dilaudid given for pain; patient in tears from severity of pain    0320: ambulate to bathroom;  ibuprofen given for headache    0400:  Mothers milk taken to L&D floor    0627: Dilaudid given for 7/10 pain     Potassium 3.3 NP notified; PO 40mEq K-ANTONELLA given   Concerns for physician to address:       Zone phone for oncoming shift:             Adelina Wolfe RN

## 2022-05-07 NOTE — PROGRESS NOTES
Progress Note    Patient: Mario Matias MRN: [id number]  SSN: [id number]    YOB: 1990  Age: 33 y.o. Sex: female        ADMITTED:  2022 to Fide Epstein MD  for Severe sepsis ([facility].) [A41.9, R65.20]  UTI (urinary tract infection) [N39.0]  Urolithiasis [N20.9]         [facility] [A41.9, R65.20]  UTI (urinary tract infection) [N39.0]  Urolithiasis [N20.9]. S/p  L stent  ecoli should be s to current abx, f/u blood cx neg so fat  Temp down  Still feels crummy with occasional chills    Vitals:  Temp (24hrs), Av.4 °F (38 °C), Min:98.3 °F (36.8 °C), Max:103.1 °F (39.5 °C)     Blood pressure 128/79, pulse 95, temperature 98.6 °F (37 °C), resp. rate 16, height 5' 1\" (1.549 m), weight 119.8 kg (264 lb 1.8 oz), SpO2 99 %. I&O's:  1901 -  07  In: 647.5 [I.V.:647.5]  Out: -     07 -  190  In: 300 [I.V.:300]  Out: -      Exam:   NAD. Sick looking     Labs:   Recent Labs     22   WBC 5.7 15.4* 32.3*   HGB 11.2* 11.4* 11.4*   HCT 35.2 35.8 36.4   * 167 191     Recent Labs     22    138 139   K 3.3* 3.5 4.5   * 108 110*   CO2 23 25 22   * 104* 147*   BUN 9 14 14   CREA 0.84 0.85 1.13*   CA 8.0* 8.3* 7.8*        Cultures:   as above   Imaging:  IMPRESSION     1. No evidence of renal abscess. Please note that evaluation of renal parenchyma  is markedly limited by lack of intravenous contrast, however there is no  perinephric inflammatory change. Appropriate positioning of left ureteral stent  or nonobstructive renal calculi are present bilaterally. Left hydronephrosis is  improved compared to May 4.  2. Trace bilateral pleural effusions. 3. Hepatic steatosis.      Assessment:     - Active Problems:    Urolithiasis (2022)      UTI (urinary tract infection) (2022)      [facility] (5/4/2022)        Plan:     - cont ivf and abx  - toradol x 1 today, restart ibuprophen tonight, discussed with nurse  - stone treatment once infection cleared  - ct reviewed    Signed By: Mery Cobian MD - May 7, 2022

## 2022-05-07 NOTE — PROGRESS NOTES
Problem: Falls - Risk of  Goal: *Absence of Falls  Description: Document Kevin Woodall Fall Risk and appropriate interventions in the flowsheet.   Outcome: Progressing Towards Goal  Note: Fall Risk Interventions:  Mobility Interventions: Bed/chair exit alarm,Communicate number of staff needed for ambulation/transfer,Patient to call before getting OOB         Medication Interventions: Bed/chair exit alarm,Patient to call before getting OOB,Teach patient to arise slowly    Elimination Interventions: Bed/chair exit alarm,Call light in reach,Patient to call for help with toileting needs,Stay With Me (per policy),Toilet paper/wipes in reach,Toileting schedule/hourly rounds              Problem: Pain  Goal: *Control of Pain  Outcome: Progressing Towards Goal     Problem: Patient Education: Go to Patient Education Activity  Goal: Patient/Family Education  Outcome: Progressing Towards Goal

## 2022-05-08 LAB
B PERT DNA SPEC QL NAA+PROBE: NOT DETECTED
BORDETELLA PARAPERTUSSIS PCR, BORPAR: NOT DETECTED
C PNEUM DNA SPEC QL NAA+PROBE: NOT DETECTED
D DIMER PPP FEU-MCNC: 4.69 MG/L FEU (ref 0–0.65)
FLUAV H1 2009 PAND RNA SPEC QL NAA+PROBE: NOT DETECTED
FLUAV H1 RNA SPEC QL NAA+PROBE: NOT DETECTED
FLUAV H3 RNA SPEC QL NAA+PROBE: NOT DETECTED
FLUAV SUBTYP SPEC NAA+PROBE: NOT DETECTED
FLUBV RNA SPEC QL NAA+PROBE: NOT DETECTED
HADV DNA SPEC QL NAA+PROBE: NOT DETECTED
HCOV 229E RNA SPEC QL NAA+PROBE: NOT DETECTED
HCOV HKU1 RNA SPEC QL NAA+PROBE: NOT DETECTED
HCOV NL63 RNA SPEC QL NAA+PROBE: NOT DETECTED
HCOV OC43 RNA SPEC QL NAA+PROBE: NOT DETECTED
HMPV RNA SPEC QL NAA+PROBE: NOT DETECTED
HPIV1 RNA SPEC QL NAA+PROBE: NOT DETECTED
HPIV2 RNA SPEC QL NAA+PROBE: NOT DETECTED
HPIV3 RNA SPEC QL NAA+PROBE: NOT DETECTED
HPIV4 RNA SPEC QL NAA+PROBE: NOT DETECTED
M PNEUMO DNA SPEC QL NAA+PROBE: NOT DETECTED
RSV RNA SPEC QL NAA+PROBE: NOT DETECTED
RV+EV RNA SPEC QL NAA+PROBE: NOT DETECTED
SARS-COV-2 PCR, COVPCR: NOT DETECTED
TROPONIN-HIGH SENSITIVITY: 5 NG/L (ref 0–51)

## 2022-05-08 PROCEDURE — 74011250636 HC RX REV CODE- 250/636: Performed by: INTERNAL MEDICINE

## 2022-05-08 PROCEDURE — 74011000258 HC RX REV CODE- 258: Performed by: INTERNAL MEDICINE

## 2022-05-08 PROCEDURE — 36415 COLL VENOUS BLD VENIPUNCTURE: CPT

## 2022-05-08 PROCEDURE — 84484 ASSAY OF TROPONIN QUANT: CPT

## 2022-05-08 PROCEDURE — 74011250637 HC RX REV CODE- 250/637: Performed by: INTERNAL MEDICINE

## 2022-05-08 PROCEDURE — 74011000250 HC RX REV CODE- 250: Performed by: STUDENT IN AN ORGANIZED HEALTH CARE EDUCATION/TRAINING PROGRAM

## 2022-05-08 PROCEDURE — 0202U NFCT DS 22 TRGT SARS-COV-2: CPT

## 2022-05-08 PROCEDURE — 85379 FIBRIN DEGRADATION QUANT: CPT

## 2022-05-08 PROCEDURE — 65270000046 HC RM TELEMETRY

## 2022-05-08 RX ADMIN — SODIUM CHLORIDE, PRESERVATIVE FREE 10 ML: 5 INJECTION INTRAVENOUS at 22:27

## 2022-05-08 RX ADMIN — PIPERACILLIN AND TAZOBACTAM 3.38 G: 3; .375 INJECTION, POWDER, LYOPHILIZED, FOR SOLUTION INTRAVENOUS at 10:03

## 2022-05-08 RX ADMIN — HYDROMORPHONE HYDROCHLORIDE 1 MG: 1 INJECTION, SOLUTION INTRAMUSCULAR; INTRAVENOUS; SUBCUTANEOUS at 16:31

## 2022-05-08 RX ADMIN — BUTALBITAL, ACETAMINOPHEN, AND CAFFEINE 1 TABLET: 50; 325; 40 TABLET ORAL at 10:10

## 2022-05-08 RX ADMIN — HYDROMORPHONE HYDROCHLORIDE 1 MG: 1 INJECTION, SOLUTION INTRAMUSCULAR; INTRAVENOUS; SUBCUTANEOUS at 20:28

## 2022-05-08 RX ADMIN — DOXYCYCLINE 100 MG: 100 INJECTION, POWDER, LYOPHILIZED, FOR SOLUTION INTRAVENOUS at 12:58

## 2022-05-08 RX ADMIN — HYDROMORPHONE HYDROCHLORIDE 1 MG: 1 INJECTION, SOLUTION INTRAMUSCULAR; INTRAVENOUS; SUBCUTANEOUS at 12:09

## 2022-05-08 RX ADMIN — SODIUM CHLORIDE, PRESERVATIVE FREE 10 ML: 5 INJECTION INTRAVENOUS at 05:46

## 2022-05-08 RX ADMIN — PIPERACILLIN AND TAZOBACTAM 3.38 G: 3; .375 INJECTION, POWDER, LYOPHILIZED, FOR SOLUTION INTRAVENOUS at 16:31

## 2022-05-08 RX ADMIN — IBUPROFEN 400 MG: 400 TABLET, FILM COATED ORAL at 20:01

## 2022-05-08 RX ADMIN — ENOXAPARIN SODIUM 30 MG: 100 INJECTION SUBCUTANEOUS at 06:15

## 2022-05-08 RX ADMIN — HYDROMORPHONE HYDROCHLORIDE 1 MG: 1 INJECTION, SOLUTION INTRAMUSCULAR; INTRAVENOUS; SUBCUTANEOUS at 03:41

## 2022-05-08 RX ADMIN — DOXYCYCLINE 100 MG: 100 INJECTION, POWDER, LYOPHILIZED, FOR SOLUTION INTRAVENOUS at 20:28

## 2022-05-08 RX ADMIN — TAMSULOSIN HYDROCHLORIDE 0.4 MG: 0.4 CAPSULE ORAL at 10:03

## 2022-05-08 RX ADMIN — HYDROMORPHONE HYDROCHLORIDE 1 MG: 1 INJECTION, SOLUTION INTRAMUSCULAR; INTRAVENOUS; SUBCUTANEOUS at 07:30

## 2022-05-08 RX ADMIN — BUTALBITAL, ACETAMINOPHEN, AND CAFFEINE 1 TABLET: 50; 325; 40 TABLET ORAL at 01:09

## 2022-05-08 RX ADMIN — ENOXAPARIN SODIUM 30 MG: 100 INJECTION SUBCUTANEOUS at 16:31

## 2022-05-08 RX ADMIN — SODIUM CHLORIDE 125 ML/HR: 9 INJECTION, SOLUTION INTRAVENOUS at 07:31

## 2022-05-08 RX ADMIN — SODIUM CHLORIDE, PRESERVATIVE FREE 10 ML: 5 INJECTION INTRAVENOUS at 14:09

## 2022-05-08 RX ADMIN — BUTALBITAL, ACETAMINOPHEN, AND CAFFEINE 1 TABLET: 50; 325; 40 TABLET ORAL at 20:01

## 2022-05-08 NOTE — PROGRESS NOTES
1920: Bedside and Verbal shift change report given to betzaida (oncoming nurse) by Macario Mcknight  (offgoing nurse).  Report included the following information SBAR, Kardex, Intake/Output, MAR, Recent Results and Cardiac Rhythm nsr.       0700: report given to Hannah Cintron RN

## 2022-05-08 NOTE — PROGRESS NOTES
Progress Note    Patient: Arturo Hammond MRN: [id number]  SSN: [id number]    YOB: 1990  Age: 33 y.o. Sex: female        ADMITTED:  2022 to Kalpesh Machuca MD  for Severe sepsis ([facility] 75.) [A41.9, R65.20]  UTI (urinary tract infection) [N39.0]  Urolithiasis [N20.9]         Arturo Hammond was admitted for Severe sepsis ([facility] [A41.9, R65.20]  UTI (urinary tract infection) [N39.0]  Urolithiasis [N20.9]. Feels a little better but spiled another fever    Vitals:  Temp (24hrs), Av.4 °F (37.4 °C), Min:98.5 °F (36.9 °C), Max:102.1 °F (38.9 °C)     Blood pressure 139/83, pulse 91, temperature (!) 102.1 °F (38.9 °C), resp. rate 18, height 5' 1\" (1.549 m), weight 119.8 kg (264 lb 1.8 oz), SpO2 99 %. I&O's:  1901 - 700  In: 1900 [I.V.:1900]  Out: -    701 - 1900  In: 1822.9 [I.V.:1822.9]  Out: -      Exam:   NAD.       Labs:   Recent Labs     22  0133 22  0249   WBC 5.7 15.4*   HGB 11.2* 11.4*   HCT 35.2 35.8   * 167     Recent Labs     22  0133 22  0249    138   K 3.3* 3.5   * 108   CO2 23 25   * 104*   BUN 9 14   CREA 0.84 0.85   CA 8.0* 8.3*        Cultures:      Imaging:       Assessment:     - Active Problems:    Urolithiasis (2022)      UTI (urinary tract infection) (2022)      Severe sepsis ([facility] (2022)        Plan:     - continue current management, follow up cx.  -consider renal mass protocol ct Tuesday if still spiking to eval intrarenal abscess    Signed By: Whitney Mays MD - May 8, 2022

## 2022-05-08 NOTE — PROGRESS NOTES
Hospitalist Progress Note    NAME: Mario Matias   :  1990   MRN:  [id number]       Assessment / Plan:  Left-sided hydronephrosis due to nephrolithiasis s/p cystoscopy and stent placement  Severe sepsis  Gram-negative kayli bacteremia  -S/p cystoscopy with stent placement  -Blood cultures are growing gram-negative bacteremia.   -Continue Zosyn. Follow repeat blood cultures  -Follow final urine and blood culture results. Adjust antibiotics  -Continue Dilaudid and Norco for pain control. Continue Flomax  -We will add ibuprofen for fever as well  - continue IV fluids  -Check CT abdomen  -urology is following        Body mass index is 49.9 kg/m². Code status: Full  Prophylaxis: Lovenox  Recommended Disposition: Home w/Family     Subjective:     Chief Complaint / Reason for Physician Visit  Still continues to have borderline fever but fever is improved      Review of Systems:  Symptom Y/N Comments  Symptom Y/N Comments   Fever/Chills    Chest Pain     Poor Appetite    Edema     Cough    Abdominal Pain     Sputum    Joint Pain     SOB/HAMMOND    Pruritis/Rash     Nausea/vomit    Tolerating PT/OT     Diarrhea    Tolerating Diet     Constipation    Other       Could NOT obtain due to:      Objective:     VITALS:   Last 24hrs VS reviewed since prior progress note. Most recent are:  Patient Vitals for the past 24 hrs:   Temp Pulse Resp BP SpO2   22 1936 98.5 °F (36.9 °C) 82 17 115/83 99 %   22 1520 98.8 °F (37.1 °C) 78 16 (!) 136/93 98 %   22 1107 98.3 °F (36.8 °C) 78 16 110/68 98 %   22 0800 98.6 °F (37 °C) 95 16 128/79 99 %   22 0317 98.5 °F (36.9 °C) 82 15 103/68 98 %   22 0010 99.2 °F (37.3 °C) -- -- -- --   22 2310 100.3 °F (37.9 °C) 98 19 107/66 96 %       Intake/Output Summary (Last 24 hours) at 20224  Last data filed at 2022 1800  Gross per 24 hour   Intake 1900 ml   Output --   Net 1900 ml        PHYSICAL EXAM:  General: WD, WN.  Alert, cooperative, no acute distress    EENT:  EOMI. Anicteric sclerae. MMM  Resp:  CTA bilaterally, no wheezing or rales. No accessory muscle use  CV:  Regular  rhythm,  No edema  GI:  Soft, left flank tenderness, +Bowel sounds  Neurologic:  Alert and oriented X 3, normal speech,   Psych:   Good insight. Not anxious nor agitated  Skin:  No rashes.   No jaundice    Reviewed most current lab test results and cultures  YES  Reviewed most current radiology test results   YES  Review and summation of old records today    NO  Reviewed patient's current orders and MAR    YES  PMH/SH reviewed - no change compared to H&P          Current Facility-Administered Medications:     butalbital-acetaminophen-caffeine (FIORICET, ESGIC) -40 mg per tablet 1 Tablet, 1 Tablet, Oral, Q6H PRN, Jerrod Sousa MD, 1 Tablet at 05/07/22 1948    [COMPLETED] piperacillin-tazobactam (ZOSYN) 4.5 g in 0.9% sodium chloride (MBP/ADV) 100 mL MBP, 4.5 g, IntraVENous, ONCE, Stopped at 05/07/22 0830 **FOLLOWED BY** piperacillin-tazobactam (ZOSYN) 3.375 g in 0.9% sodium chloride (MBP/ADV) 100 mL MBP, 3.375 g, IntraVENous, Q8H, Brant Fuller MD, Last Rate: 25 mL/hr at 05/07/22 1648, 3.375 g at 05/07/22 1648    enoxaparin (LOVENOX) injection 30 mg, 30 mg, SubCUTAneous, Q12H, Brant Fuller MD, 30 mg at 05/07/22 1648    ibuprofen (MOTRIN) tablet 400 mg, 400 mg, Oral, Q6H PRN, Jerrod Sousa MD, 400 mg at 05/07/22 0833    HYDROmorphone (DILAUDID) injection 1 mg, 1 mg, IntraVENous, Q4H PRN, Brant Barroso MD, 1 mg at 05/07/22 1823    tamsulosin (FLOMAX) capsule 0.4 mg, 0.4 mg, Oral, DAILY, Brant Fuller MD, 0.4 mg at 05/07/22 0833    HYDROcodone-acetaminophen (NORCO) 5-325 mg per tablet 1 Tablet, 1 Tablet, Oral, Q6H PRN, Jerrod Sousa MD, 1 Tablet at 05/07/22 0007    0.9% sodium chloride infusion, 125 mL/hr, IntraVENous, CONTINUOUS, Jerrod Sousa MD, Last Rate: 125 mL/hr at 05/07/22 0526, 125 mL/hr at 05/07/22 0526    sodium chloride (NS) flush 5-40 mL, 5-40 mL, IntraVENous, Q8H, Zeus Chong MD, 10 mL at 05/07/22 1440    sodium chloride (NS) flush 5-40 mL, 5-40 mL, IntraVENous, PRN, Zeus Chong MD    acetaminophen (TYLENOL) tablet 650 mg, 650 mg, Oral, Q6H PRN, 650 mg at 05/06/22 1940 **OR** acetaminophen (TYLENOL) suppository 650 mg, 650 mg, Rectal, Q6H PRN, Zeus Chong MD    polyethylene glycol (MIRALAX) packet 17 g, 17 g, Oral, DAILY PRN, Zeus Chong MD    ondansetron (ZOFRAN ODT) tablet 4 mg, 4 mg, Oral, Q8H PRN **OR** ondansetron (ZOFRAN) injection 4 mg, 4 mg, IntraVENous, Q6H PRN, Zeus Chong MD, 4 mg at 05/07/22 1445  ________________________________________________________________________  Care Plan discussed with:    Comments   Patient y    Family      RN y    Care Manager     Consultant                        Multidiciplinary team rounds were held today with , nursing, pharmacist and clinical coordinator. Patient's plan of care was discussed; medications were reviewed and discharge planning was addressed. ________________________________________________________________________  Total NON critical care TIME: 35  Minutes    Total CRITICAL CARE TIME Spent:   Minutes non procedure based      Comments   >50% of visit spent in counseling and coordination of care     ________________________________________________________________________  Deandre Bustillo MD     Procedures: see electronic medical records for all procedures/Xrays and details which were not copied into this note but were reviewed prior to creation of Plan. LABS:  I reviewed today's most current labs and imaging studies.   Pertinent labs include:  Recent Labs     05/07/22  0133 05/06/22  0249 05/05/22  0424   WBC 5.7 15.4* 32.3*   HGB 11.2* 11.4* 11.4*   HCT 35.2 35.8 36.4   * 167 191     Recent Labs     05/07/22  0133 05/06/22  0249 05/05/22  0424    138 139   K 3.3* 3.5 4.5   * 108 110*   CO2 23 25 22   * 104* 147*   BUN 9 14 14   CREA 0.84 0.85 1.13*   CA 8.0* 8.3* 7.8*       Signed: Korey Landa MD

## 2022-05-08 NOTE — PROGRESS NOTES
Problem: Falls - Risk of  Goal: *Absence of Falls  Description: Document Chani Raman Fall Risk and appropriate interventions in the flowsheet.   Outcome: Progressing Towards Goal  Note: Fall Risk Interventions:  Mobility Interventions: Patient to call before getting OOB         Medication Interventions: Patient to call before getting OOB    Elimination Interventions: Call light in reach,Patient to call for help with toileting needs,Stay With Me (per policy)              Problem: Patient Education: Go to Patient Education Activity  Goal: Patient/Family Education  Outcome: Progressing Towards Goal     Problem: Pain  Goal: *Control of Pain  Outcome: Progressing Towards Goal     Problem: Patient Education: Go to Patient Education Activity  Goal: Patient/Family Education  Outcome: Progressing Towards Goal

## 2022-05-09 ENCOUNTER — APPOINTMENT (OUTPATIENT)
Dept: CT IMAGING | Age: 32
DRG: 720 | End: 2022-05-09
Attending: INTERNAL MEDICINE
Payer: MEDICAID

## 2022-05-09 LAB
ANION GAP SERPL CALC-SCNC: 7 MMOL/L (ref 5–15)
BUN SERPL-MCNC: 10 MG/DL (ref 6–20)
BUN/CREAT SERPL: 11 (ref 12–20)
CALCIUM SERPL-MCNC: 8.5 MG/DL (ref 8.5–10.1)
CHLORIDE SERPL-SCNC: 106 MMOL/L (ref 97–108)
CO2 SERPL-SCNC: 25 MMOL/L (ref 21–32)
CREAT SERPL-MCNC: 0.89 MG/DL (ref 0.55–1.02)
ERYTHROCYTE [DISTWIDTH] IN BLOOD BY AUTOMATED COUNT: 15.4 % (ref 11.5–14.5)
GLUCOSE SERPL-MCNC: 101 MG/DL (ref 65–100)
HCT VFR BLD AUTO: 34.8 % (ref 35–47)
HGB BLD-MCNC: 11.1 G/DL (ref 11.5–16)
MCH RBC QN AUTO: 27.1 PG (ref 26–34)
MCHC RBC AUTO-ENTMCNC: 31.9 G/DL (ref 30–36.5)
MCV RBC AUTO: 85.1 FL (ref 80–99)
NRBC # BLD: 0 K/UL (ref 0–0.01)
NRBC BLD-RTO: 0 PER 100 WBC
PLATELET # BLD AUTO: 177 K/UL (ref 150–400)
PMV BLD AUTO: 10.2 FL (ref 8.9–12.9)
POTASSIUM SERPL-SCNC: 3.3 MMOL/L (ref 3.5–5.1)
RBC # BLD AUTO: 4.09 M/UL (ref 3.8–5.2)
SODIUM SERPL-SCNC: 138 MMOL/L (ref 136–145)
WBC # BLD AUTO: 6.8 K/UL (ref 3.6–11)

## 2022-05-09 PROCEDURE — 74011250636 HC RX REV CODE- 250/636: Performed by: INTERNAL MEDICINE

## 2022-05-09 PROCEDURE — 86757 RICKETTSIA ANTIBODY: CPT

## 2022-05-09 PROCEDURE — 74011000258 HC RX REV CODE- 258: Performed by: INTERNAL MEDICINE

## 2022-05-09 PROCEDURE — 74011250637 HC RX REV CODE- 250/637: Performed by: INTERNAL MEDICINE

## 2022-05-09 PROCEDURE — 74011000636 HC RX REV CODE- 636: Performed by: INTERNAL MEDICINE

## 2022-05-09 PROCEDURE — 99223 1ST HOSP IP/OBS HIGH 75: CPT | Performed by: INTERNAL MEDICINE

## 2022-05-09 PROCEDURE — 71275 CT ANGIOGRAPHY CHEST: CPT

## 2022-05-09 PROCEDURE — 80048 BASIC METABOLIC PNL TOTAL CA: CPT

## 2022-05-09 PROCEDURE — 65270000046 HC RM TELEMETRY

## 2022-05-09 PROCEDURE — 85027 COMPLETE CBC AUTOMATED: CPT

## 2022-05-09 PROCEDURE — 36415 COLL VENOUS BLD VENIPUNCTURE: CPT

## 2022-05-09 PROCEDURE — 87476 LYME DIS DNA AMP PROBE: CPT

## 2022-05-09 PROCEDURE — 74011000250 HC RX REV CODE- 250: Performed by: STUDENT IN AN ORGANIZED HEALTH CARE EDUCATION/TRAINING PROGRAM

## 2022-05-09 PROCEDURE — 74011250637 HC RX REV CODE- 250/637

## 2022-05-09 RX ORDER — POTASSIUM CHLORIDE 750 MG/1
20 TABLET, FILM COATED, EXTENDED RELEASE ORAL
Status: COMPLETED | OUTPATIENT
Start: 2022-05-09 | End: 2022-05-09

## 2022-05-09 RX ORDER — DOXYCYCLINE HYCLATE 100 MG
100 TABLET ORAL EVERY 12 HOURS
Status: DISCONTINUED | OUTPATIENT
Start: 2022-05-09 | End: 2022-05-11 | Stop reason: HOSPADM

## 2022-05-09 RX ADMIN — ENOXAPARIN SODIUM 30 MG: 100 INJECTION SUBCUTANEOUS at 04:39

## 2022-05-09 RX ADMIN — TAMSULOSIN HYDROCHLORIDE 0.4 MG: 0.4 CAPSULE ORAL at 08:37

## 2022-05-09 RX ADMIN — CEFTRIAXONE SODIUM 2 G: 2 INJECTION, POWDER, FOR SOLUTION INTRAMUSCULAR; INTRAVENOUS at 13:44

## 2022-05-09 RX ADMIN — HYDROMORPHONE HYDROCHLORIDE 1 MG: 1 INJECTION, SOLUTION INTRAMUSCULAR; INTRAVENOUS; SUBCUTANEOUS at 13:43

## 2022-05-09 RX ADMIN — HYDROMORPHONE HYDROCHLORIDE 1 MG: 1 INJECTION, SOLUTION INTRAMUSCULAR; INTRAVENOUS; SUBCUTANEOUS at 09:44

## 2022-05-09 RX ADMIN — POTASSIUM CHLORIDE 20 MEQ: 750 TABLET, EXTENDED RELEASE ORAL at 10:40

## 2022-05-09 RX ADMIN — PIPERACILLIN AND TAZOBACTAM 3.38 G: 3; .375 INJECTION, POWDER, LYOPHILIZED, FOR SOLUTION INTRAVENOUS at 00:08

## 2022-05-09 RX ADMIN — POTASSIUM CHLORIDE 20 MEQ: 750 TABLET, EXTENDED RELEASE ORAL at 06:12

## 2022-05-09 RX ADMIN — IOPAMIDOL 100 ML: 755 INJECTION, SOLUTION INTRAVENOUS at 10:09

## 2022-05-09 RX ADMIN — PIPERACILLIN AND TAZOBACTAM 3.38 G: 3; .375 INJECTION, POWDER, LYOPHILIZED, FOR SOLUTION INTRAVENOUS at 09:40

## 2022-05-09 RX ADMIN — SODIUM CHLORIDE, PRESERVATIVE FREE 10 ML: 5 INJECTION INTRAVENOUS at 13:43

## 2022-05-09 RX ADMIN — ENOXAPARIN SODIUM 30 MG: 100 INJECTION SUBCUTANEOUS at 16:20

## 2022-05-09 RX ADMIN — SODIUM CHLORIDE, PRESERVATIVE FREE 10 ML: 5 INJECTION INTRAVENOUS at 05:08

## 2022-05-09 RX ADMIN — BUTALBITAL, ACETAMINOPHEN, AND CAFFEINE 1 TABLET: 50; 325; 40 TABLET ORAL at 06:15

## 2022-05-09 RX ADMIN — HYDROMORPHONE HYDROCHLORIDE 1 MG: 1 INJECTION, SOLUTION INTRAMUSCULAR; INTRAVENOUS; SUBCUTANEOUS at 04:42

## 2022-05-09 RX ADMIN — IBUPROFEN 400 MG: 400 TABLET, FILM COATED ORAL at 18:29

## 2022-05-09 RX ADMIN — HYDROMORPHONE HYDROCHLORIDE 1 MG: 1 INJECTION, SOLUTION INTRAMUSCULAR; INTRAVENOUS; SUBCUTANEOUS at 17:43

## 2022-05-09 RX ADMIN — HYDROMORPHONE HYDROCHLORIDE 1 MG: 1 INJECTION, SOLUTION INTRAMUSCULAR; INTRAVENOUS; SUBCUTANEOUS at 00:21

## 2022-05-09 RX ADMIN — HYDROMORPHONE HYDROCHLORIDE 1 MG: 1 INJECTION, SOLUTION INTRAMUSCULAR; INTRAVENOUS; SUBCUTANEOUS at 21:44

## 2022-05-09 RX ADMIN — DOXYCYCLINE 100 MG: 100 INJECTION, POWDER, LYOPHILIZED, FOR SOLUTION INTRAVENOUS at 08:37

## 2022-05-09 RX ADMIN — DOXYCYCLINE HYCLATE 100 MG: 100 TABLET, COATED ORAL at 21:44

## 2022-05-09 RX ADMIN — SODIUM CHLORIDE, PRESERVATIVE FREE 10 ML: 5 INJECTION INTRAVENOUS at 21:44

## 2022-05-09 RX ADMIN — IBUPROFEN 400 MG: 400 TABLET, FILM COATED ORAL at 06:15

## 2022-05-09 RX ADMIN — BUTALBITAL, ACETAMINOPHEN, AND CAFFEINE 1 TABLET: 50; 325; 40 TABLET ORAL at 01:58

## 2022-05-09 RX ADMIN — SODIUM CHLORIDE 125 ML/HR: 9 INJECTION, SOLUTION INTRAVENOUS at 00:21

## 2022-05-09 RX ADMIN — SODIUM CHLORIDE 125 ML/HR: 9 INJECTION, SOLUTION INTRAVENOUS at 08:36

## 2022-05-09 NOTE — PROGRESS NOTES
Problem: Falls - Risk of  Goal: *Absence of Falls  Description: Document Yumiko Stephens Fall Risk and appropriate interventions in the flowsheet. Outcome: Progressing Towards Goal  Note: Fall Risk Interventions:  Mobility Interventions: Assess mobility with egress test,Patient to call before getting OOB         Medication Interventions: Bed/chair exit alarm,Patient to call before getting OOB,Teach patient to arise slowly    Elimination Interventions: Call light in reach,Patient to call for help with toileting needs              Problem: Pain  Goal: *Control of Pain  Outcome: Progressing Towards Goal     Problem: Falls - Risk of  Goal: *Absence of Falls  Description: Document Yumiko Stephens Fall Risk and appropriate interventions in the flowsheet.   5/9/2022 0809 by Radha Guido  Outcome: Progressing Towards Goal  Note: Fall Risk Interventions:  Mobility Interventions: Assess mobility with egress test,Patient to call before getting OOB         Medication Interventions: Bed/chair exit alarm,Patient to call before getting OOB,Teach patient to arise slowly    Elimination Interventions: Call light in reach,Patient to call for help with toileting needs           5/9/2022 0809 by Radha Guido  Outcome: Progressing Towards Goal  Note: Fall Risk Interventions:  Mobility Interventions: Assess mobility with egress test,Patient to call before getting OOB         Medication Interventions: Bed/chair exit alarm,Patient to call before getting OOB,Teach patient to arise slowly    Elimination Interventions: Call light in reach,Patient to call for help with toileting needs              Problem: Patient Education: Go to Patient Education Activity  Goal: Patient/Family Education  Outcome: Progressing Towards Goal     Problem: Pain  Goal: *Control of Pain  5/9/2022 0809 by Radha Guido  Outcome: Progressing Towards Goal  5/9/2022 0809 by Radha Guido  Outcome: Progressing Towards Goal     Problem: Patient Education: Go to Patient Education Activity  Goal: Patient/Family Education  Outcome: Progressing Towards Goal

## 2022-05-09 NOTE — PROGRESS NOTES
Nocturnist NP Note         Repleted potassium level of 3.3 with 20 meq of po KCL. Please note that this note was dictated using Dragon computer voice recognition software. Quite often unanticipated grammatical, syntax, homophones, and other interpretive errors are inadvertently transcribed by the computer software. Please disregard these errors. Please excuse any errors that have escaped final proofreading.

## 2022-05-09 NOTE — PROGRESS NOTES
Spiritual Care Assessment/Progress Note  [facility]      NAME: Jordan Choe      MRN: [id number]  AGE: 33 y.o.  SEX: female  Adventism Affiliation: Bradley Silver   Language: English     5/9/2022     Total Time (in minutes): 32     Spiritual Assessment begun in [facility] PROGRESSIVE CARE through conversation with:         []Patient        [] Family    [] Friend(s)        Reason for Consult: Initial/Spiritual assessment, patient floor     Spiritual beliefs: (Please include comment if needed)     [x] Identifies with a maricel tradition:   Evangelical       [] Supported by a maricel community:            [] Claims no spiritual orientation:           [] Seeking spiritual identity:                [] Adheres to an individual form of spirituality:           [] Not able to assess:                           Identified resources for coping:      [x] Prayer                               [] Music                  [] Guided Imagery     [x] Family/friends                 [] Pet visits     [] Devotional reading                         [] Unknown     [] Other:                                               Interventions offered during this visit: (See comments for more details)    Patient Interventions: Initial/Spiritual assessment, patient floor,Coping skills reviewed/reinforced,Life review/legacy,Normalization of emotional/spiritual concerns,Prayer (assurance of),Integration of medical assessment with existing values and beliefs,Affirmation of maricel,Catharsis/review of pertinent events in supportive environment,Iconic (affirming the presence of God/Higher Power)           Plan of Care:     [] Support spiritual and/or cultural needs    [] Support AMD and/or advance care planning process      [] Support grieving process   [] Coordinate Rites and/or Rituals    [] Coordination with community clergy   [x] No spiritual needs identified at this time   [] Detailed Plan of Care below (See Comments)  [] Make referral to Music Therapy  [] Make referral to Pet Therapy     [] Make referral to Addiction services  [] Make referral to [facility]  [] Make referral to Spiritual Care Partner  [] No future visits requested        [x] Contact Spiritual Care for further referrals     Comments:  Visit was in 2249 for initial spiritual assessment. Patient was sitting in bed talking to her amanda on phone. She ended conversation with amanda and welcomed visit warmly. She engaged at length in life review and story telling. She has three children, two with late , and can't wait to go back home to her family. Patient shared her maricel story and how maricel helps in coping. Her late  led her to maricel and she has grown in her maricel ever since. She stated that difficult times make us stronger. She lives with her amanda with whom she has a son. He is a great dad to her children and she feels blessed for his support.  listened actively with affirmation and encouraged continuous self are. She expressed no need for support at this time. She was appreciative for the visit. Visited by: Lucrecia Causey.    Paging Service: [phone])

## 2022-05-09 NOTE — PROGRESS NOTES
Progress Note    Patient: Jacinto Ernandez MRN: [id number]  SSN: [id number]    YOB: 1990  Age: 33 y.o. Sex: female        ADMITTED:  2022 to Marisel Laguerre MD  [facility] [A41.9, R65.20]  UTI (urinary tract infection) [N39.0]  Urolithiasis [N20.9]         Jacinto Ernandez is 5 Days Post-Op Procedure(s):  CYSTOSCOPY, LEFT  URETERAL STENT INSERTION. She is doing fair. She has moderate pain. She has no nausea. She is tolerating a solid diet. Patient is voiding with difficulty. Severe stent colic reported. Hx of stent colic and poor pain tolerance with stenting in the past.   HD stable   Renal function NL  K repleted this morning. E. Coli UTI with GNR bacteremia on blood cx . Repeat blood cx NGTD    Was afebrile on Saturday, spiked 102 temp yesterday midday last resolved with NSAIDs. Currently afebrile. Vitals:  Temp (24hrs), Av.8 °F (37.7 °C), Min:97.9 °F (36.6 °C), Max:102.1 °F (38.9 °C)     Blood pressure 126/85, pulse 72, temperature 97.9 °F (36.6 °C), resp. rate 18, height 5' 1\" (1.549 m), weight 119.8 kg (264 lb 1.8 oz), SpO2 100 %. I&O's:   1901 -  0700  In: 3900 [P.O.:850; I.V.:3050]  Out: -    No intake/output data recorded. Exam:   PHYSICAL EXAM:   General:           Pleasant AA female. NAD    Head:               Normocephalic, without obvious abnormality, atraumatic. Eyes:               Conjunctivae clear and pale, anicteric sclerae. Pupils are equal  Nose:               Nares normal. No drainage or sinus tenderness. Throat:             Lips, mucosa, and tongue normal.  No Thrush  Neck:               Supple, symmetrical,  no adenopathy, thyroid: non tender  Back:               Symmetric,  No CVA tenderness. Lungs:             CTA bilaterally. No wheezing/rhonchi/rales. Heart:              Not tachycardic  Abdomen:        Soft, generalized tenderness. Mildly tender, non distended.  No masses  :                  Voiding independently   Extremities:     Atraumatic, No cyanosis. No edema. No clubbing  Skin:                Texture, turgor normal. No rashes/lesions/jaundice  Psych:             Good insight. Not depressed. Not anxious or agitated. Neurologic:      EOMs intact. No facial asymmetry. No aphasia or slurred speech. Normal  strength, A/O X 3. Labs:   Recent Labs     05/09/22  0038 05/07/22  0133   WBC 6.8 5.7   HGB 11.1* 11.2*   HCT 34.8* 35.2    147*     Recent Labs     05/09/22 0038 05/07/22  0133    137   K 3.3* 3.3*    109*   CO2 25 23   * 120*   BUN 10 9   CREA 0.89 0.84   CA 8.5 8.0*        Cultures:      Imaging:       Assessment:     - 5 Days Post-Op Procedure(s):  CYSTOSCOPY, LEFT  URETERAL STENT INSERTION    Active Problems:    Urolithiasis (5/4/2022)      UTI (urinary tract infection) (5/4/2022)      Severe sepsis ([address] (5/4/2022)        Plan:     - await cx finalization  - repeat CT scan no renal abscess, stent in good position, improved left hydronephrosis with stent in placed. No current surgical urologic intervention. Has follow up scheduled for stone treatment. Only re-image with CT tomorrow if still spiking a fever  -pain controlled on current regimen.  Anticipate home once afebrile (temp >101.5) >24 hours and negative blood cultures however ID to weigh in and evaluate today     Signed By: Nikhil Hines NP - May 9, 2022

## 2022-05-09 NOTE — PROGRESS NOTES
0700: Bedside shift change report given to Nirav Lei RN (oncoming nurse) by Maria De Jesus Potts (offgoing nurse). Report included the following information SBAR, Kardex, Intake/Output, MAR and Recent Results. 0945: Urology at bedside assessing patient, see note. Will continue current treatment. 0301: Patient off the floor to CT    1046: Patient back on the floor from CT. 1830: Patient temp 99.4, Ibuprofen given to prevent fever. 1900: End of Shift Note    Bedside shift change report given to gibran RN (oncoming nurse) by Graciela De Los Santos (offgoing nurse). Report included the following information SBAR, Kardex, Intake/Output, MAR and Recent Results    Shift worked:  4721-3632     Shift summary and any significant changes:     patient still in a lot of flank pain. PRN Dilaudid. ID consult, IV ABX. Afebrile this shift     Concerns for physician to address:  none     Zone phone for oncoming shift:   XXX       Activity:  Activity Level: Up with Assistance  Number times ambulated in hallways past shift: 0  Number of times OOB to chair past shift: 0    Cardiac:   Cardiac Monitoring: Yes      Cardiac Rhythm: Sinus Rhythm    Access:   Current line(s): PIV     Genitourinary:   Urinary status: voiding    Respiratory:   O2 Device: None (Room air)  Chronic home O2 use?: NO  Incentive spirometer at bedside: N/A       GI:  Last Bowel Movement Date: 05/08/22  Current diet:  ADULT DIET Regular  Passing flatus: YES  Tolerating current diet: YES       Pain Management:   Patient states pain is manageable on current regimen: YES    Skin:  Gómez Score: 20  Interventions: float heels, increase time out of bed and PT/OT consult    Patient Safety:  Fall Score:  Total Score: 1  Interventions: bed/chair alarm, gripper socks, pt to call before getting OOB and stay with me (per policy)  High Fall Risk: Yes    Length of Stay:  Expected LOS: - - -  Actual LOS: [facility]

## 2022-05-09 NOTE — PROGRESS NOTES
Hospitalist Progress Note    NAME: Daniel Van   :  1990   MRN:  [id number]       Assessment / Plan:  Left-sided hydronephrosis due to nephrolithiasis s/p cystoscopy and stent placement  Severe sepsis  Gram-negative kayli bacteremia  -S/p cystoscopy with stent placement  -Blood cultures are growing gram-negative bacteremia.   -Continue Zosyn. Follow repeat blood cultures  -Urine cultures growing E. coli sensitive to Zosyn. Follow blood culture  -Continue Dilaudid and Norco for pain control. Continue Flomax  -We will add ibuprofen for fever as well  - continue IV fluids  -Repeat CT of abdomen shows no evidence of renal abscess  -urology is following    Persistent fever  -She is now reporting that she removed a tick from her back 1 week ago  -Start empiric doxycycline  -Consult ID  -Urology is also considering repeating a CT renal protocol    Sharp chest/back pain  -She is currently on room air. Reports pleuritic type of pain  -D-dimer is high but also has bacterial infection and sepsis  -If she becomes hypoxic, consider CTA  -She is at low risk for pulmonary embolism as she is on Lovenox      Body mass index is 49.9 kg/m². Code status: Full  Prophylaxis: Lovenox  Recommended Disposition: Home w/Family     Subjective:     Chief Complaint / Reason for Physician Visit  She still continues to have fever  She reports pain in the back of her shoulders, sharp, increased with deep breathing      Review of Systems:  Symptom Y/N Comments  Symptom Y/N Comments   Fever/Chills    Chest Pain     Poor Appetite    Edema     Cough    Abdominal Pain     Sputum    Joint Pain     SOB/HAMMOND    Pruritis/Rash     Nausea/vomit    Tolerating PT/OT     Diarrhea    Tolerating Diet     Constipation    Other       Could NOT obtain due to:      Objective:     VITALS:   Last 24hrs VS reviewed since prior progress note.  Most recent are:  Patient Vitals for the past 24 hrs:   Temp Pulse Resp BP SpO2   22 100.3 °F (37.9 °C) 77 24 122/74 99 %   05/08/22 1648 (!) 101.8 °F (38.8 °C) -- -- -- --   05/08/22 1131 (!) 102.1 °F (38.9 °C) -- -- -- --   05/08/22 1005 100.2 °F (37.9 °C) -- -- -- --   05/08/22 0745 98.7 °F (37.1 °C) 91 18 139/83 99 %   05/08/22 0355 98.7 °F (37.1 °C) 84 18 124/88 98 %   05/07/22 2359 98.5 °F (36.9 °C) 83 16 114/73 98 %       Intake/Output Summary (Last 24 hours) at 5/8/2022 2156  Last data filed at 5/8/2022 1600  Gross per 24 hour   Intake 3900 ml   Output --   Net 3900 ml        PHYSICAL EXAM:  General: WD, WN. Alert, cooperative, no acute distress    EENT:  EOMI. Anicteric sclerae. MMM  Resp:  CTA bilaterally, no wheezing or rales. No accessory muscle use  CV:  Regular  rhythm,  No edema  GI:  Soft, left flank tenderness, +Bowel sounds  Neurologic:  Alert and oriented X 3, normal speech,   Psych:   Good insight. Not anxious nor agitated  Skin:  No rashes.   No jaundice    Reviewed most current lab test results and cultures  YES  Reviewed most current radiology test results   YES  Review and summation of old records today    NO  Reviewed patient's current orders and MAR    YES  PMH/SH reviewed - no change compared to H&P          Current Facility-Administered Medications:     doxycycline (VIBRAMYCIN) 100 mg in 0.9% sodium chloride (MBP/ADV) 100 mL MBP, 100 mg, IntraVENous, Q12H, Brant Fuller MD, Last Rate: 100 mL/hr at 05/08/22 2028, 100 mg at 05/08/22 2028    butalbital-acetaminophen-caffeine (FIORICET, ESGIC) -40 mg per tablet 1 Tablet, 1 Tablet, Oral, Q6H PRN, Harry Seymour MD, 1 Tablet at 05/08/22 2001    [COMPLETED] piperacillin-tazobactam (ZOSYN) 4.5 g in 0.9% sodium chloride (MBP/ADV) 100 mL MBP, 4.5 g, IntraVENous, ONCE, Stopped at 05/07/22 0830 **FOLLOWED BY** piperacillin-tazobactam (ZOSYN) 3.375 g in 0.9% sodium chloride (MBP/ADV) 100 mL MBP, 3.375 g, IntraVENous, Q8H, Brant Fuller MD, Last Rate: 25 mL/hr at 05/08/22 1631, 3.375 g at 05/08/22 1631    enoxaparin (LOVENOX) injection 30 mg, 30 mg, SubCUTAneous, Q12H, Brant Fuller MD, 30 mg at 05/08/22 1631    ibuprofen (MOTRIN) tablet 400 mg, 400 mg, Oral, Q6H PRN, Concetta Moore MD, 400 mg at 05/08/22 2001    HYDROmorphone (DILAUDID) injection 1 mg, 1 mg, IntraVENous, Q4H PRN, Concetta Moore MD, 1 mg at 05/08/22 2028    tamsulosin (FLOMAX) capsule 0.4 mg, 0.4 mg, Oral, DAILY, Mabelene Crigler, Twilla Gimenez, MD, 0.4 mg at 05/08/22 1003    HYDROcodone-acetaminophen (NORCO) 5-325 mg per tablet 1 Tablet, 1 Tablet, Oral, Q6H PRN, Concetta Moore MD, 1 Tablet at 05/07/22 0007    0.9% sodium chloride infusion, 125 mL/hr, IntraVENous, CONTINUOUS, Concetta Moore MD, Last Rate: 125 mL/hr at 05/08/22 0731, 125 mL/hr at 05/08/22 0731    sodium chloride (NS) flush 5-40 mL, 5-40 mL, IntraVENous, Q8H, Karol Campos MD, 10 mL at 05/08/22 1409    sodium chloride (NS) flush 5-40 mL, 5-40 mL, IntraVENous, PRN, Karol Campos MD    acetaminophen (TYLENOL) tablet 650 mg, 650 mg, Oral, Q6H PRN, 650 mg at 05/06/22 1940 **OR** acetaminophen (TYLENOL) suppository 650 mg, 650 mg, Rectal, Q6H PRN, Karol Campos MD    polyethylene glycol (MIRALAX) packet 17 g, 17 g, Oral, DAILY PRN, Karol Campos MD    ondansetron (ZOFRAN ODT) tablet 4 mg, 4 mg, Oral, Q8H PRN **OR** ondansetron (ZOFRAN) injection 4 mg, 4 mg, IntraVENous, Q6H PRN, Karol Campos MD, 4 mg at 05/07/22 1445  ________________________________________________________________________  Care Plan discussed with:    Comments   Patient y    Family      RN y    Care Manager     Consultant                        Multidiciplinary team rounds were held today with , nursing, pharmacist and clinical coordinator. Patient's plan of care was discussed; medications were reviewed and discharge planning was addressed.      ________________________________________________________________________  Total NON critical care TIME: 35  Minutes    Total CRITICAL CARE TIME Spent:   Minutes non procedure based      Comments   >50% of visit spent in counseling and coordination of care     ________________________________________________________________________  Virgil Rodas MD     Procedures: see electronic medical records for all procedures/Xrays and details which were not copied into this note but were reviewed prior to creation of Plan. LABS:  I reviewed today's most current labs and imaging studies.   Pertinent labs include:  Recent Labs     05/07/22  0133 05/06/22  0249   WBC 5.7 15.4*   HGB 11.2* 11.4*   HCT 35.2 35.8   * 167     Recent Labs     05/07/22 0133 05/06/22  0249    138   K 3.3* 3.5   * 108   CO2 23 25   * 104*   BUN 9 14   CREA 0.84 0.85   CA 8.0* 8.3*       Signed: Virgil Rodas MD

## 2022-05-09 NOTE — PROGRESS NOTES
Problem: Falls - Risk of  Goal: *Absence of Falls  Description: Document Valencia Vaughn Fall Risk and appropriate interventions in the flowsheet.   Outcome: Progressing Towards Goal  Note: Fall Risk Interventions:  Mobility Interventions: Assess mobility with egress test,Patient to call before getting OOB         Medication Interventions: Bed/chair exit alarm,Patient to call before getting OOB,Teach patient to arise slowly    Elimination Interventions: Call light in reach,Patient to call for help with toileting needs

## 2022-05-09 NOTE — PROGRESS NOTES
Hospitalist Progress Note    NAME: Esteban Angel   :  1990   MRN:  [id number]       Assessment / Plan:  Left-sided hydronephrosis due to nephrolithiasis s/p cystoscopy and stent placement  Severe sepsis  Gram-negative kayli bacteremia  -S/p cystoscopy with stent placement  -Blood cultures are growing gram-negative bacteremia and not final yet  -Antibiotics switched by ID to ceftriaxone  -Urine cultures growing E. coli sensitive to ceftriaxone  -Urology is following  -She continues to have persistent fever in spite of ceftriaxone and Zosyn. Fever is currently slightly better but she is also getting ibuprofen. -We have to make sure the patient is afebrile off of ibuprofen as it could mask fever before discharge    Persistent fever  Suspected tickborne illness  -She reported  on  that she pulled a tick about 1 week ago  -Continue empiric doxycycline  -Appreciate ID recommendations    Sharp chest/back pain  Elevated D-dimer  -CTA chest was done which did not reveal any evidence of pulmonary embolism  -She reports that her chest pain is improved. She is on room air    Hypokalemia  -Replaced with KCl. Recheck in a.m. Body mass index is 49.9 kg/m². Code status: Full  Prophylaxis: Lovenox  Recommended Disposition: Home w/Family     Subjective:     Chief Complaint / Reason for Physician Visit  Fever is better today but she has been receiving ibuprofen every 6 hours. Does not report any abdominal pain, nausea or vomiting  She still reports feeling weak  No diarrhea      Review of Systems:  Symptom Y/N Comments  Symptom Y/N Comments   Fever/Chills    Chest Pain     Poor Appetite    Edema     Cough    Abdominal Pain     Sputum    Joint Pain     SOB/HAMMOND    Pruritis/Rash     Nausea/vomit    Tolerating PT/OT     Diarrhea    Tolerating Diet     Constipation    Other       Could NOT obtain due to:      Objective:     VITALS:   Last 24hrs VS reviewed since prior progress note.  Most recent are:  Patient Vitals for the past 24 hrs:   Temp Pulse Resp BP SpO2   05/09/22 1046 98.1 °F (36.7 °C) 73 17 125/87 98 %   05/09/22 0738 97.9 °F (36.6 °C) 72 18 126/85 100 %   05/09/22 0546 -- 75 15 -- 98 %   05/09/22 0448 97.9 °F (36.6 °C) 74 10 110/70 97 %   05/09/22 0401 -- -- 14 -- 99 %   05/09/22 0121 -- (!) 56 12 -- 96 %   05/09/22 0023 98.1 °F (36.7 °C) 62 14 (!) 102/58 99 %   05/08/22 2227 99.7 °F (37.6 °C) -- -- -- --   05/08/22 2120 -- 85 17 -- 93 %   05/08/22 2004 100.3 °F (37.9 °C) 77 24 122/74 99 %   05/08/22 1648 (!) 101.8 °F (38.8 °C) -- -- -- --       Intake/Output Summary (Last 24 hours) at 5/9/2022 1338  Last data filed at 5/9/2022 1046  Gross per 24 hour   Intake 3130 ml   Output 700 ml   Net 2430 ml        PHYSICAL EXAM:  General: WD, WN. Alert, cooperative, no acute distress    EENT:  EOMI. Anicteric sclerae. MMM  Resp:  CTA bilaterally, no wheezing or rales. No accessory muscle use  CV:  Regular  rhythm,  No edema  GI:  Soft, no flank tenderness, no guarding or rigidity  Neurologic:  Alert and oriented X 3, normal speech,   Psych:   Good insight. Not anxious nor agitated  Skin:  No rashes.   No jaundice    Reviewed most current lab test results and cultures  YES  Reviewed most current radiology test results   YES  Review and summation of old records today    NO  Reviewed patient's current orders and MAR    YES  PMH/SH reviewed - no change compared to H&P          Current Facility-Administered Medications:     cefTRIAXone (ROCEPHIN) 2 g in 0.9% sodium chloride (MBP/ADV) 50 mL MBP, 2 g, IntraVENous, Q24H, Shruthi Daniels MD    doxycycline (VIBRA-TABS) tablet 100 mg, 100 mg, Oral, Q12H, Madie Daniels MD    butalbital-acetaminophen-caffeine (FIORICET, ESGIC) -40 mg per tablet 1 Tablet, 1 Tablet, Oral, Q6H PRN, Kyle Greer MD, 1 Tablet at 05/09/22 0615    enoxaparin (LOVENOX) injection 30 mg, 30 mg, SubCUTAneous, Q12H, Brant Fuller MD, 30 mg at 05/09/22 0439    ibuprofen (MOTRIN) tablet 400 mg, 400 mg, Oral, Q6H PRN, Kimberly Dent MD, 400 mg at 05/09/22 0615    HYDROmorphone (DILAUDID) injection 1 mg, 1 mg, IntraVENous, Q4H PRN, Kimberly Dent MD, 1 mg at 05/09/22 0944    tamsulosin (FLOMAX) capsule 0.4 mg, 0.4 mg, Oral, DAILY, Oswaldo Anderson MD, 0.4 mg at 05/09/22 0837    HYDROcodone-acetaminophen (NORCO) 5-325 mg per tablet 1 Tablet, 1 Tablet, Oral, Q6H PRN, Kimberly Dent MD, 1 Tablet at 05/07/22 0007    0.9% sodium chloride infusion, 75 mL/hr, IntraVENous, CONTINUOUS, Kimberly Dent MD, Last Rate: 75 mL/hr at 05/09/22 0941, 75 mL/hr at 05/09/22 0941    sodium chloride (NS) flush 5-40 mL, 5-40 mL, IntraVENous, Q8H, Tawana Tavarez MD, 10 mL at 05/09/22 0508    sodium chloride (NS) flush 5-40 mL, 5-40 mL, IntraVENous, PRN, Tawana Tavarez MD    acetaminophen (TYLENOL) tablet 650 mg, 650 mg, Oral, Q6H PRN, 650 mg at 05/06/22 1940 **OR** acetaminophen (TYLENOL) suppository 650 mg, 650 mg, Rectal, Q6H PRN, Tawana Tavarez MD    polyethylene glycol (MIRALAX) packet 17 g, 17 g, Oral, DAILY PRN, Tawana Tavarez MD    ondansetron (ZOFRAN ODT) tablet 4 mg, 4 mg, Oral, Q8H PRN **OR** ondansetron (ZOFRAN) injection 4 mg, 4 mg, IntraVENous, Q6H PRN, Tawana Tavarez MD, 4 mg at 05/07/22 1445  ________________________________________________________________________  Care Plan discussed with:    Comments   Patient y    Family      RN y    Care Manager     Consultant                        Multidiciplinary team rounds were held today with , nursing, pharmacist and clinical coordinator. Patient's plan of care was discussed; medications were reviewed and discharge planning was addressed.      ________________________________________________________________________  Total NON critical care TIME: 35  Minutes    Total CRITICAL CARE TIME Spent:   Minutes non procedure based      Comments   >50% of visit spent in counseling and coordination of care ________________________________________________________________________  German Webber MD     Procedures: see electronic medical records for all procedures/Xrays and details which were not copied into this note but were reviewed prior to creation of Plan. LABS:  I reviewed today's most current labs and imaging studies.   Pertinent labs include:  Recent Labs     05/09/22 0038 05/07/22 0133   WBC 6.8 5.7   HGB 11.1* 11.2*   HCT 34.8* 35.2    147*     Recent Labs     05/09/22 0038 05/07/22 0133    137   K 3.3* 3.3*    109*   CO2 25 23   * 120*   BUN 10 9   CREA 0.89 0.84   CA 8.5 8.0*       Signed: German Webber MD

## 2022-05-09 NOTE — PROGRESS NOTES
Transition of Care Plan:     RUR: 7% - low risk  Disposition: Home vs Home with HH  Follow up appointments: Specialist. Pt does not have a PCP and declined CM assistance. DME needed: No needs identified. Has crutches at home.   Transportation at [address] [place] [address] or means to access home:  [facility] has access.      IM Medicare Letter: N/A  Is patient a BCPI-A Bundle: No                     If yes, was Bundle Letter given?: No   Is patient a [place] and connected with the [address] [place] [address]  If yes, was [employer] transfer form completed and [facility] notified? Caregiver Contact: Julius Robert', [phone]  Discharge Caregiver contacted prior to discharge? CM to contact prior to discharge.   Care Conference needed?: Not indicated at this time.     Initial Note 11:50 am: In review of chart, pt is not medically stable for discharge. Barriers: fever, cultures, and ID final recommendations. Unit CM will continue to follow.      Paul Taylor RN, BSN, [address] [place] [address] Care Manager  [phone]

## 2022-05-09 NOTE — CONSULTS
Infectious Disease Consult    Date of Consultation:  May 9, 2022  Reason for Consultation: tick borne illness  Referring Physician: Dr Merlene Rucker  Date of Admission:5/4/22     Impression  E.coli Bacteremia & UTI  [facility]- 5/4 + for E.coli ( amp R )  4/4 + at 13:37, 2/2+  at 19:15, 19:30  Negative  BC -5/6 - NG  Urine culture- 5/4 >100,000 E.coli. L/proximal ureteral Calculus 4mm  Mild hydroureteronephrosis   R/sided renal calculi as well, non obstructing  S/p cystoscopy and stenting on 5/4 by urology. Repeat CT 5/7 showed it in appropriate position, no renal abscess, improved hydronephrosis. H/o ureteral calculi & stent placement during 2 prenancies- 7 yrs ago, 11 mths ago. H/o pulling tick off [facility] weekend 4/17  No fevers at the time or after. Persistent fever  T-max 101.8 on 5/8    Plan  Change to Ceftriaxone IV for E.coli bacteremia & UTI  DC Zosyn  Pt should be able to be transitioned to po Cipro/ Levaquin for DC if afebrile and asymptomatic. (No abdominal/flank pain) . Plan is for 2 weeks of antimicrobial therapy , end date 5/20. But considering complicated past history, B/L nephrolithiasis, continued fever, pt may need to DC on Ceftriaxone iv    Doxycycline po for tick bite total 10-14 days. Await tick bite panel. Jacinto Ernandez is a 33 Yr old female with PMH significant for nephrolithiasis who presented to the emergency department with flank pain. Patient reported left flank pain that began at noon. Went to outside ED, underwent CT which showed a 4 mm obstructing stone. She was  initially planned to be discharged however developed a fever in the emergency department. Blood cultures were done. Patient was transferred to  [address] prior to antibiotics. Foot ED H&P on arrival patient was still moaning & complaining of left flank pain. She had reported  some nausea and vomiting. Denied sore throat, chest pain but does report mild cough. Denies abdominal pain, diarrhea.   CT abdomen/pelvis done.  Report as follows   FINDINGS:   LOWER THORAX: No significant abnormality in the incidentally imaged lower chest.  LIVER/GALLBLADDER: Hepatic steatosis Gallbladder is within normal limits. CBD is  not dilated. SPLEEN/PANCREAS:  within normal limits. ADRENALS/KIDNEYS: No adrenal mass. Mild hydroureteronephrosis on the left. There  is a 4 mm calculus in the proximal left ureter. Right renal calculi measuring up  to 8 mm in size. STOMACH: Unremarkable. SMALL BOWEL/COLON: No dilatation or wall thickening. No dilatation or wall  thickening. APPENDIX: Unremarkable. PERITONEUM: No ascites or pneumoperitoneum. RETROPERITONEUM: No lymphadenopathy or aortic aneurysm. REPRODUCTIVE ORGANS: Likely small uterine fibroid. URINARY BLADDER: No mass or calculus. BONES: Generative changes at L5-S1. ADDITIONAL COMMENTS: N/A     IMPRESSION  Mild hydroureteronephrosis on the left with proximal left ureteral calculus  measuring 4 mm in size. There are right renal calculi that are nonobstructive. Incidental and/or nonemergent findings are as described in detail above. WBC 16.6, 12.4 earlier that day. Creatinine 1.28  Patient had fever T-max 102.4 on 5/4,103.1 on 5/6, 101.8 on 5/8. Patient has been on empiric antibiotics initially ceftriaxone IV 5/4-5/6. Patient was changed to Zosyn on 5/6. Doxycycline IV was added on 5/8. Patient seen today. She is afebrile. Feels better overall. BC- 5/4 + for E.coli( amp R )  4/4 at 13:37, 2/2 at 19:15, 19:30  Repeat BC -5/6 - NG  Urine culture- 5/4 >100,000 E.coli. Patient recalls pulling tick off [facility] weekend 4/17  No fevers at the time or after. Tick bite serology ordered, it is pending at this time. Patient showed me skin demetria on left shoulder where she had pulled off the tick. .  Patient states much better today. No flank pain minimal left-sided abdominal pain. Past Medical History:   Diagnosis Date    Nephrolithiasis      History reviewed.  No pertinent surgical history. No Known Allergies     Social Connections:     Frequency of Communication with Friends and Family: Not on file    Frequency of Social Gatherings with Friends and Family: Not on file    Attends Latter day Services: Not on file    Active Member of Clubs or Organizations: Not on file    Attends Club or Organization Meetings: Not on file    Marital Status: Not on file     No family status information on file. Medication Documentation Review Audit     Reviewed by Mikayla Christian RN (Registered Nurse) on 05/04/22 at 2232    Medication Sig Documenting Provider Last Dose Status Taking? HYDROcodone-acetaminophen (Norco) 5-325 mg per tablet Take 1 Tablet by mouth every six (6) hours as needed for Pain for up to 3 days. Max Daily Amount: 4 Tablets. Cristin Adams MD  Active    ibuprofen (MOTRIN) 600 mg tablet Take 1 Tablet by mouth every six (6) hours as needed for Pain. Cristin Adams MD  Active    ondansetron (ZOFRAN ODT) 4 mg disintegrating tablet Take 1 Tablet by mouth every eight (8) hours as needed for Nausea. Cristin Adams MD  Active    tamsulosin (Flomax) 0.4 mg capsule Take 1 Capsule by mouth daily for 7 doses. Cristin Adams MD  Active               Review of Systems - Negative except those mentioned in H&P. PHYSICAL EXAM:  General:          WD, WN. Alert, cooperative, no acute distress    EENT:              EOMI. Anicteric sclerae. MMM  Resp:               CTA bilaterally, no wheezing or rales. No accessory muscle use  CV:                  Regular  rhythm,  No edema  GI:                   Soft, Non distended, Non tender. +Bowel sounds  Neurologic:      Alert and oriented X 3, normal speech,   Psych:             Good insight. Not anxious nor agitated  Skin:                No rashes. No jaundice.     Sumeet Gama MD [facility] Officer

## 2022-05-09 NOTE — PROGRESS NOTES
1900: Bedside and Verbal shift change report given to betzaida (oncoming nurse) by ian (offgoing nurse). Report included the following information SBAR, Kardex, Intake/Output, MAR, Recent Results and Cardiac Rhythm nsr.     2030: pcr covid swab done, droplet plus protocol initiated      2345: pcr came back negative     0740: report given to Lakshmi Kim RN

## 2022-05-10 LAB
ANION GAP SERPL CALC-SCNC: 7 MMOL/L (ref 5–15)
BUN SERPL-MCNC: 11 MG/DL (ref 6–20)
BUN/CREAT SERPL: 16 (ref 12–20)
CALCIUM SERPL-MCNC: 8.8 MG/DL (ref 8.5–10.1)
CHLORIDE SERPL-SCNC: 107 MMOL/L (ref 97–108)
CO2 SERPL-SCNC: 24 MMOL/L (ref 21–32)
CREAT SERPL-MCNC: 0.68 MG/DL (ref 0.55–1.02)
ERYTHROCYTE [DISTWIDTH] IN BLOOD BY AUTOMATED COUNT: 15.3 % (ref 11.5–14.5)
GLUCOSE SERPL-MCNC: 89 MG/DL (ref 65–100)
HCT VFR BLD AUTO: 35.9 % (ref 35–47)
HGB BLD-MCNC: 11.5 G/DL (ref 11.5–16)
MCH RBC QN AUTO: 27.6 PG (ref 26–34)
MCHC RBC AUTO-ENTMCNC: 32 G/DL (ref 30–36.5)
MCV RBC AUTO: 86.3 FL (ref 80–99)
NRBC # BLD: 0 K/UL (ref 0–0.01)
NRBC BLD-RTO: 0 PER 100 WBC
PLATELET # BLD AUTO: 193 K/UL (ref 150–400)
PMV BLD AUTO: 10.2 FL (ref 8.9–12.9)
POTASSIUM SERPL-SCNC: 3.8 MMOL/L (ref 3.5–5.1)
RBC # BLD AUTO: 4.16 M/UL (ref 3.8–5.2)
SODIUM SERPL-SCNC: 138 MMOL/L (ref 136–145)
WBC # BLD AUTO: 7.1 K/UL (ref 3.6–11)

## 2022-05-10 PROCEDURE — 99233 SBSQ HOSP IP/OBS HIGH 50: CPT | Performed by: INTERNAL MEDICINE

## 2022-05-10 PROCEDURE — 36415 COLL VENOUS BLD VENIPUNCTURE: CPT

## 2022-05-10 PROCEDURE — 80048 BASIC METABOLIC PNL TOTAL CA: CPT

## 2022-05-10 PROCEDURE — 74011250636 HC RX REV CODE- 250/636: Performed by: INTERNAL MEDICINE

## 2022-05-10 PROCEDURE — 74011000250 HC RX REV CODE- 250: Performed by: STUDENT IN AN ORGANIZED HEALTH CARE EDUCATION/TRAINING PROGRAM

## 2022-05-10 PROCEDURE — 74011000258 HC RX REV CODE- 258: Performed by: INTERNAL MEDICINE

## 2022-05-10 PROCEDURE — 74011250637 HC RX REV CODE- 250/637: Performed by: INTERNAL MEDICINE

## 2022-05-10 PROCEDURE — 86666 EHRLICHIA ANTIBODY: CPT

## 2022-05-10 PROCEDURE — 65270000046 HC RM TELEMETRY

## 2022-05-10 PROCEDURE — 85027 COMPLETE CBC AUTOMATED: CPT

## 2022-05-10 RX ORDER — OXYCODONE HYDROCHLORIDE 5 MG/1
5 TABLET ORAL
Status: DISCONTINUED | OUTPATIENT
Start: 2022-05-10 | End: 2022-05-10

## 2022-05-10 RX ORDER — HYDROMORPHONE HYDROCHLORIDE 2 MG/1
1 TABLET ORAL
Status: DISCONTINUED | OUTPATIENT
Start: 2022-05-10 | End: 2022-05-10

## 2022-05-10 RX ORDER — HYDROMORPHONE HYDROCHLORIDE 2 MG/1
1 TABLET ORAL
Status: DISCONTINUED | OUTPATIENT
Start: 2022-05-10 | End: 2022-05-11 | Stop reason: HOSPADM

## 2022-05-10 RX ADMIN — HYDROMORPHONE HYDROCHLORIDE 1 MG: 2 TABLET ORAL at 19:29

## 2022-05-10 RX ADMIN — CEFTRIAXONE SODIUM 2 G: 2 INJECTION, POWDER, FOR SOLUTION INTRAMUSCULAR; INTRAVENOUS at 12:01

## 2022-05-10 RX ADMIN — IBUPROFEN 400 MG: 400 TABLET, FILM COATED ORAL at 00:25

## 2022-05-10 RX ADMIN — HYDROMORPHONE HYDROCHLORIDE 1 MG: 1 INJECTION, SOLUTION INTRAMUSCULAR; INTRAVENOUS; SUBCUTANEOUS at 01:45

## 2022-05-10 RX ADMIN — IBUPROFEN 400 MG: 400 TABLET, FILM COATED ORAL at 06:14

## 2022-05-10 RX ADMIN — ENOXAPARIN SODIUM 30 MG: 100 INJECTION SUBCUTANEOUS at 03:48

## 2022-05-10 RX ADMIN — DOXYCYCLINE HYCLATE 100 MG: 100 TABLET, COATED ORAL at 08:22

## 2022-05-10 RX ADMIN — SODIUM CHLORIDE, PRESERVATIVE FREE 10 ML: 5 INJECTION INTRAVENOUS at 21:49

## 2022-05-10 RX ADMIN — SODIUM CHLORIDE, PRESERVATIVE FREE 10 ML: 5 INJECTION INTRAVENOUS at 06:14

## 2022-05-10 RX ADMIN — DOXYCYCLINE HYCLATE 100 MG: 100 TABLET, COATED ORAL at 21:49

## 2022-05-10 RX ADMIN — HYDROMORPHONE HYDROCHLORIDE 1 MG: 2 TABLET ORAL at 15:11

## 2022-05-10 RX ADMIN — ENOXAPARIN SODIUM 30 MG: 100 INJECTION SUBCUTANEOUS at 15:11

## 2022-05-10 RX ADMIN — SODIUM CHLORIDE 75 ML/HR: 9 INJECTION, SOLUTION INTRAVENOUS at 03:48

## 2022-05-10 RX ADMIN — HYDROMORPHONE HYDROCHLORIDE 1 MG: 2 TABLET ORAL at 10:48

## 2022-05-10 RX ADMIN — BUTALBITAL, ACETAMINOPHEN, AND CAFFEINE 1 TABLET: 50; 325; 40 TABLET ORAL at 01:49

## 2022-05-10 RX ADMIN — TAMSULOSIN HYDROCHLORIDE 0.4 MG: 0.4 CAPSULE ORAL at 08:22

## 2022-05-10 RX ADMIN — HYDROMORPHONE HYDROCHLORIDE 1 MG: 1 INJECTION, SOLUTION INTRAMUSCULAR; INTRAVENOUS; SUBCUTANEOUS at 06:14

## 2022-05-10 RX ADMIN — SODIUM CHLORIDE, PRESERVATIVE FREE 10 ML: 5 INJECTION INTRAVENOUS at 12:01

## 2022-05-10 NOTE — PROGRESS NOTES
0700: Bedside shift change report given to Amina Lamb RN (oncoming nurse) by Kaila Florence RN (offgoing nurse). Report included the following information SBAR, Kardex, Intake/Output, MAR and Recent Results. 0800: Heide NP with urology at bedside assessing patient. States she does not want patient to get Marifer Quails at this time to see if patient will be afebrile for thee day. Otherwise, looks good from urology standpoint. 1000: Dr. Linda Tyler at bedside assessing patient. IV Dilaudid switched to PO at this time. Agrees to avoid giving Motrin and if afebrile, will d/c tomorrow. Verbal orders to place [address] orders to tele. Orders that patient can shower. 1020: Dr. Freddie Pearl at bedside assessing patient. Will go home on PO ABX, will sign off.     1048: PRN Dilaudid given for pain 8 out of 10.     1430: Patient in shower at this time. 1500: Patient is grimacing and tearful because she is in so much pain. Patient states she cannot wait until next dose Dilaudid is due. Dr. Evi Woo at this time to see options for pain meds. 1515: Dr. Linda Tyler placed orders for Dilaudid Q4, PRN Dilaudid given at this time. 1900: End of Shift Note    Bedside shift change report given to oncoming RN (oncoming nurse) by Carlo Anne (offgoing nurse). Report included the following information SBAR, Kardex, Intake/Output, MAR and Recent Results    Shift worked:  [id number]     Shift summary and any significant changes:     possible d/c tomorrow     Concerns for physician to address:  pain meds on d/c?      Zone phone for oncoming shift:   XXX       Activity:  Activity Level: Up ad ariana  Number times ambulated in hallways past shift: 1  Number of times OOB to chair past shift: 3    Cardiac:   Cardiac Monitoring: Yes      Cardiac Rhythm: Sinus Rhythm    Access:   Current line(s): PIV     Genitourinary:   Urinary status: voiding    Respiratory:   O2 Device: None (Room air)  Chronic home O2 use?: NO  Incentive spirometer at bedside: N/A GI:  Last Bowel Movement Date: 05/08/22  Current diet:  ADULT DIET Regular  Passing flatus: YES  Tolerating current diet: YES       Pain Management:   Patient states pain is manageable on current regimen: YES    Skin:  Gómez Score: 20  Interventions: float heels, increase time out of bed and PT/OT consult    Patient Safety:  Fall Score:  Total Score: 1  Interventions: bed/chair alarm, gripper socks, pt to call before getting OOB and stay with me (per policy)  High Fall Risk: Yes    Length of Stay:  Expected LOS: - - -  Actual LOS: [facility]

## 2022-05-10 NOTE — PROGRESS NOTES
Transition of Care Plan:     RUR: 7% - low risk  Disposition: Home vs Home with HH  Follow up appointments: Specialist. Pt does not have a PCP and declined CM assistance. DME needed: No needs identified. Has crutches at home.   Transportation at [address] [place] [address] or means to access home:  [facility] has access.      IM Medicare Letter: N/A  Is patient a BCPI-A Bundle: No                     If yes, was Bundle Letter given?: No   Is patient a [facility] and connected with the [address] [place] [address]  If yes, was [facility] transfer form completed and [facility] notified? Caregiver Contact: Julius Ibarra [place]', [phone]  Discharge Caregiver contacted prior to discharge? CM to contact prior to discharge.   Care Conference needed?: Not indicated at this time.     CM attended IDR. Pt has an YANN of 5/11/22. CM will continue to follow.     SERGE Kemp  Care Management, [address]

## 2022-05-10 NOTE — PROGRESS NOTES
Infectious Disease progress         Impression  E.coli Bacteremia & UTI  BC- 5/4 + for E.coli ( amp R )  4/4 + at 13:37, 2/2+  at 19:15, 19:30  Negative  BC -5/6 - NG  Urine culture- 5/4 >100,000 E.coli. L/proximal ureteral Calculus 4mm  Mild hydroureteronephrosis   R/sided renal calculi as well, non obstructing  S/p cystoscopy and stenting on 5/4 by urology. Repeat CT 5/7 showed it in appropriate position, no renal abscess, improved hydronephrosis. H/o ureteral calculi & stent placement during 2 prenancies- 7 yrs ago, 11 mths ago. H/o pulling tick off Easter weekend 4/17  No fevers at the time or after. S/p  fever  Now resolved    Plan  Ceftriaxone IV for E.coli bacteremia & UTI  Pt is afebrile, pain less except for stent pain. Pt should be able to be discharged on po Levaquin end date 5/20. Doxycycline po for tick bite total 10-14 days. Await tick bite panel.- call [phone] for results. ID out pt follow up not required. Pt seen. Denies new complaints. Complains of pain due to Ureteral stent. No fever, chills, dysuria. Daniel Van is a 33 Yr old female with PMH significant for nephrolithiasis who presented to the emergency department with flank pain. Patient reported left flank pain that began at noon. Went to outside ED, underwent CT which showed a 4 mm obstructing stone. She was  initially planned to be discharged however developed a fever in the emergency department. Blood cultures were done. Patient was transferred to [facility] [address] prior to antibiotics. Foot ED H&P on arrival patient was still moaning & complaining of left flank pain. She had reported  some nausea and vomiting. Denied sore throat, chest pain but does report mild cough. Denies abdominal pain, diarrhea. CT abdomen/pelvis done.   Report as follows   FINDINGS:   LOWER THORAX: No significant abnormality in the incidentally imaged lower chest.  LIVER/GALLBLADDER: Hepatic steatosis Gallbladder is within normal limits. CBD is  not dilated. SPLEEN/PANCREAS:  within normal limits. ADRENALS/KIDNEYS: No adrenal mass. Mild hydroureteronephrosis on the left. There  is a 4 mm calculus in the proximal left ureter. Right renal calculi measuring up  to 8 mm in size. STOMACH: Unremarkable. SMALL BOWEL/COLON: No dilatation or wall thickening. No dilatation or wall  thickening. APPENDIX: Unremarkable. PERITONEUM: No ascites or pneumoperitoneum. RETROPERITONEUM: No lymphadenopathy or aortic aneurysm. REPRODUCTIVE ORGANS: Likely small uterine fibroid. URINARY BLADDER: No mass or calculus. BONES: Generative changes at L5-S1. ADDITIONAL COMMENTS: N/A     IMPRESSION  Mild hydroureteronephrosis on the left with proximal left ureteral calculus  measuring 4 mm in size. There are right renal calculi that are nonobstructive. Incidental and/or nonemergent findings are as described in detail above. WBC 16.6, 12.4 earlier that day. Creatinine 1.28  Patient had fever T-max 102.4 on 5/4,103.1 on 5/6, 101.8 on 5/8. Patient has been on empiric antibiotics initially ceftriaxone IV 5/4-5/6. Patient was changed to Zosyn on 5/6. Doxycycline IV was added on 5/8. Patient seen today. She is afebrile. Feels better overall. BC- 5/4 + for E.coli( amp R )  4/4 at 13:37, 2/2 at 19:15, 19:30  Repeat BC -5/6 - NG  Urine culture- 5/4 >100,000 E.coli. Patient recalls pulling tick off [facility] weekend 4/17  No fevers at the time or after. Tick bite serology ordered, it is pending at this time. Patient showed me skin demetria on left shoulder where she had pulled off the tick. .  Patient states much better today. No flank pain minimal left-sided abdominal pain. Past Medical History:   Diagnosis Date    Nephrolithiasis      History reviewed. No pertinent surgical history.   No Known Allergies     Social Connections:     Frequency of Communication with Friends and Family: Not on file    Frequency of Social Gatherings with Friends and Family: Not on file    Attends Mu-ism Services: Not on file    Active Member of Clubs or Organizations: Not on file    Attends Club or Organization Meetings: Not on file    Marital Status: Not on file     No family status information on file. Medication Documentation Review Audit     Reviewed by Carissa Roberts RN (Registered Nurse) on 05/04/22 at 2232    Medication Sig Documenting Provider Last Dose Status Taking? HYDROcodone-acetaminophen (Norco) 5-325 mg per tablet Take 1 Tablet by mouth every six (6) hours as needed for Pain for up to 3 days. Max Daily Amount: 4 Tablets. Galileo Kirk MD  Active    ibuprofen (MOTRIN) 600 mg tablet Take 1 Tablet by mouth every six (6) hours as needed for Pain. Galileo Kirk MD  Active    ondansetron (ZOFRAN ODT) 4 mg disintegrating tablet Take 1 Tablet by mouth every eight (8) hours as needed for Nausea. Galileo Kirk MD  Active    tamsulosin (Flomax) 0.4 mg capsule Take 1 Capsule by mouth daily for 7 doses. Galileo Kirk MD  Active               Review of Systems - Negative except those mentioned in H&P. PHYSICAL EXAM:  General:          WD, WN. Alert, cooperative, no acute distress    EENT:              EOMI. Anicteric sclerae. MMM  Resp:               CTA bilaterally, no wheezing or rales. No accessory muscle use  CV:                  Regular  rhythm,  No edema  GI:                   Soft, Non distended, Non tender. +Bowel sounds  Neurologic:      Alert and oriented X 3, normal speech,   Psych:             Good insight. Not anxious nor agitated  Skin:                No rashes. No jaundice.     Nino Ames MD [place]

## 2022-05-10 NOTE — PROGRESS NOTES
Problem: Falls - Risk of  Goal: *Absence of Falls  Description: Document Antoine Payne Fall Risk and appropriate interventions in the flowsheet.   Outcome: Progressing Towards Goal  Note: Fall Risk Interventions:  Mobility Interventions: Bed/chair exit alarm         Medication Interventions: Bed/chair exit alarm,Patient to call before getting OOB,Teach patient to arise slowly    Elimination Interventions: Call light in reach,Patient to call for help with toileting needs,Stay With Me (per policy)

## 2022-05-10 NOTE — PROGRESS NOTES
Progress Note    Patient: Esteban Angel MRN: [id number]  SSN: [id number]    YOB: 1990  Age: 33 y.o. Sex: female        ADMITTED:  2022 to Kenneth Luciano MD  for Severe sepsis ([facility] [address] [A41.9, R65.20]  UTI (urinary tract infection) [N39.0]  Urolithiasis [N20.9]         Esteban Angel is 6 Days Post-Op Procedure(s):  CYSTOSCOPY, LEFT  URETERAL STENT INSERTION. She is doing fair. Stent colic  Persistent but managed on IV pain medication. Currently >24 hours afebrile. ID input noted. Treatment for tick bite and e. Coli UTI and bacteremia, repeat blood cx NGTD. ? Home with iv vs po abx per ID rec. Remains HD stable. Normal renal function. Hx of stent colic and poor pain tolerance with stenting in the past for renal stones          Vitals:  Temp (24hrs), Av.3 °F (36.8 °C), Min:97.8 °F (36.6 °C), Max:99.4 °F (37.4 °C)     Blood pressure 120/65, pulse 60, temperature 97.8 °F (36.6 °C), resp. rate 14, height 5' 1\" (1.549 m), weight 116.8 kg (257 lb 8 oz), SpO2 96 %. I&O's:   1901 - 05/10 0700  In: 3438.8 [P.O.:600; I.V.:2838.8]  Out: 1820 [Urine:1820]   No intake/output data recorded. Exam:   PHYSICAL EXAM:   General:           Pleasant AA female. NAD    Head:               Normocephalic, without obvious abnormality, atraumatic. Eyes:               Conjunctivae clear and pale, anicteric sclerae.  Pupils are equal  Nose:               Nares normal. No drainage or sinus tenderness. Throat:             Lips, mucosa, and tongue normal.  No Thrush  Neck:               Supple, symmetrical,  no adenopathy, thyroid: non tender  Back:               Symmetric,  No CVA tenderness. Lungs:             CTA bilaterally.  No wheezing/rhonchi/rales. Heart:              Not tachycardic  Abdomen:        Soft, generalized tenderness. Mildly tender, non distended. No masses  :                  Voiding independently   Extremities:     Atraumatic, No cyanosis.  No edema. No clubbing  Skin:                Texture, turgor normal. No rashes/lesions/jaundice  Psych:             Good insight.  Not depressed.  Not anxious or agitated. Neurologic:      EOMs intact. No facial asymmetry. No aphasia or slurred speech. Normal  strength, A/O X 3.            Labs:   Recent Labs     05/10/22  0358 05/09/22 0038   WBC 7.1 6.8   HGB 11.5 11.1*   HCT 35.9 34.8*    177     Recent Labs     05/10/22  0358 05/09/22 0038    138   K 3.8 3.3*    106   CO2 24 25   GLU 89 101*   BUN 11 10   CREA 0.68 0.89   CA 8.8 8.5        Cultures:      Imaging:       Assessment:     - 6 Days Post-Op Procedure(s):  CYSTOSCOPY, LEFT  URETERAL STENT INSERTION    Active Problems:    Urolithiasis (5/4/2022)      UTI (urinary tract infection) (5/4/2022)      Severe sepsis ([address] (5/4/2022)        Plan:     -encourage transition to PO medication for pain  -hopeful discharge today if remains afebrile  -trial today off NSAIDs  -appreciate ID recs  -no further urologic intervention    Supervising Dr. Jigna DELACRUZ By: Vickie Delvalle NP - May 10, 2022

## 2022-05-10 NOTE — PROGRESS NOTES
Hospitalist Progress Note    NAME: Shalini Rdz   :  1990   MRN:  [id number]       Assessment / Plan:  Left-sided hydronephrosis due to nephrolithiasis s/p cystoscopy and stent placement  Severe sepsis  Gram-negative kayli bacteremia  -S/p cystoscopy with stent placement  -Blood cultures are growing gram-negative bacteremia and not final yet  -Antibiotics switched by ID to ceftriaxone  -Urine cultures growing E. coli sensitive to ceftriaxone  -Urology is following  -We will discontinue all antipyretics and see if fever comes back  -Discharge in a.m. tomorrow if she remains afebrile  -She can be discharged on p.o. Levaquin   -Discontinue IV narcotics. I started her on oxycodone but she does not want oxycodone and wants p.o. Dilaudid. She is also requesting me that she wants to be discharged on p.o. Dilaudid.  -She is very tearful and anxious while talking about pain medications. Persistent fever  Suspected tickborne illness  -She reported  on  that she pulled a tick about 1 week ago  -Continue empiric doxycycline  -Appreciate ID recommendations. Tick studies pending    Sharp chest/back pain  Elevated D-dimer  -CTA chest was done which did not reveal any evidence of pulmonary embolism  -She reports that her chest pain is improved. She is on room air    Hypokalemia  -Replaced with KCl. Recheck in a.m. Body mass index is 48.65 kg/m².     Code status: Full  Prophylaxis: Lovenox  Recommended Disposition: Home w/Family     Subjective:     Chief Complaint / Reason for Physician Visit  She is afebrile in the last 12 hours  She still reports some stent pain      Review of Systems:  Symptom Y/N Comments  Symptom Y/N Comments   Fever/Chills    Chest Pain     Poor Appetite    Edema     Cough    Abdominal Pain     Sputum    Joint Pain     SOB/HAMMOND    Pruritis/Rash     Nausea/vomit    Tolerating PT/OT     Diarrhea    Tolerating Diet     Constipation    Other       Could NOT obtain due to: Objective:     VITALS:   Last 24hrs VS reviewed since prior progress note. Most recent are:  Patient Vitals for the past 24 hrs:   Temp Pulse Resp BP SpO2   05/10/22 1054 98.4 °F (36.9 °C) 71 18 124/79 99 %   05/10/22 0745 97.8 °F (36.6 °C) 60 14 120/65 96 %   05/10/22 0357 97.8 °F (36.6 °C) 66 12 109/74 98 %   05/10/22 0031 98.2 °F (36.8 °C) 64 15 114/66 100 %   22 1934 98.6 °F (37 °C) 80 14 135/73 96 %   22 1830 99.4 °F (37.4 °C) -- -- -- --   22 1625 98.2 °F (36.8 °C) 72 14 124/78 98 %       Intake/Output Summary (Last 24 hours) at 5/10/2022 1445  Last data filed at 5/10/2022 1054  Gross per 24 hour   Intake 2520.4 ml   Output 1120 ml   Net 1400.4 ml        PHYSICAL EXAM:  General: WD, WN. Alert, cooperative, no acute distress    EENT:  EOMI. Anicteric sclerae. MMM  Resp:  CTA bilaterally, no wheezing or rales. No accessory muscle use  CV:  Regular  rhythm,  No edema  GI:  Soft, no flank tenderness, no guarding or rigidity  Neurologic:  Alert and oriented X 3, normal speech,   Psych:   Good insight. Not anxious nor agitated  Skin:  No rashes.   No jaundice    Reviewed most current lab test results and cultures  YES  Reviewed most current radiology test results   YES  Review and summation of old records today    NO  Reviewed patient's current orders and MAR    YES  PMH/SH reviewed - no change compared to H&P          Current Facility-Administered Medications:     HYDROmorphone (DILAUDID) tablet 1 mg, 1 mg, Oral, Q6H PRN, Tiffanie Rocha MD, 1 mg at 05/10/22 1048    cefTRIAXone (ROCEPHIN) 2 g in 0.9% sodium chloride (MBP/ADV) 50 mL MBP, 2 g, IntraVENous, Q24H, Shruthi Daniels MD, Last Rate: 100 mL/hr at 05/10/22 1201, 2 g at 05/10/22 1201    doxycycline (VIBRA-TABS) tablet 100 mg, 100 mg, Oral, Q12H, Shruthi Daniels MD, 100 mg at 05/10/22 7315    enoxaparin (LOVENOX) injection 30 mg, 30 mg, SubCUTAneous, Q12H, Brant Fuller MD, 30 mg at 05/10/22 0348    tamsulosin (FLOMAX) capsule 0.4 mg, 0.4 mg, Oral, DAILY, Janiece Cabot, Varun K, MD, 0.4 mg at 05/10/22 2155    sodium chloride (NS) flush 5-40 mL, 5-40 mL, IntraVENous, Q8H, Lorelei Jorgensen MD, 10 mL at 05/10/22 1201    sodium chloride (NS) flush 5-40 mL, 5-40 mL, IntraVENous, PRN, Zachery Torres MD    polyethylene glycol (MIRALAX) packet 17 g, 17 g, Oral, DAILY PRN, Zachery Torres MD    ondansetron (ZOFRAN ODT) tablet 4 mg, 4 mg, Oral, Q8H PRN **OR** ondansetron (ZOFRAN) injection 4 mg, 4 mg, IntraVENous, Q6H PRN, Zachery Torres MD, 4 mg at 05/07/22 1445  ________________________________________________________________________  Care Plan discussed with:    Comments   Patient y    Family      RN y    Care Manager     Consultant                        Multidiciplinary team rounds were held today with , nursing, pharmacist and clinical coordinator. Patient's plan of care was discussed; medications were reviewed and discharge planning was addressed. ________________________________________________________________________  Total NON critical care TIME: 35  Minutes    Total CRITICAL CARE TIME Spent:   Minutes non procedure based      Comments   >50% of visit spent in counseling and coordination of care     ________________________________________________________________________  Smitha Rockwell MD     Procedures: see electronic medical records for all procedures/Xrays and details which were not copied into this note but were reviewed prior to creation of Plan. LABS:  I reviewed today's most current labs and imaging studies.   Pertinent labs include:  Recent Labs     05/10/22  0358 05/09/22  0038   WBC 7.1 6.8   HGB 11.5 11.1*   HCT 35.9 34.8*    177     Recent Labs     05/10/22  0358 05/09/22  0038    138   K 3.8 3.3*    106   CO2 24 25   GLU 89 101*   BUN 11 10   CREA 0.68 0.89   CA 8.8 8.5       Signed: Smitha Rockwell MD

## 2022-05-10 NOTE — PROGRESS NOTES
1900: Bedside shift change report given to Devyn Wilkins (oncoming nurse) by Londa Holstein RN (offgoing nurse). Report included the following information SBAR, Kardex, ED Summary, Intake/Output, MAR, Recent Results and Cardiac Rhythm NSR. Patient requested for bed alarm to be turned off. End of Shift Note    Bedside shift change report given to Rusty Romero (oncoming nurse) by Massimo Hardy RN (offgoing nurse). Report included the following information SBAR, Kardex, ED Summary, Intake/Output, MAR, Recent Results and Cardiac Rhythm NSR    Shift worked:  [id number]     Shift summary and any significant changes:     Patient requested for motrin and dilaudid IV every due time. SEE MAR. Concerns for physician to address:  Doctor may need to speak to the patient about weaning down IV pain meds and the plan of care regarding afebrile. Zone phone for oncoming shift:          Activity:  Activity Level: Up ad ariana  Number times ambulated in hallways past shift: 0  Number of times OOB to chair past shift: 0    Cardiac:   Cardiac Monitoring: Yes      Cardiac Rhythm: Sinus Rhythm    Access:   Current line(s): PIV     Genitourinary:   Urinary status: voiding    Respiratory:   O2 Device: None (Room air)  Chronic home O2 use?: NO  Incentive spirometer at bedside: NO       GI:  Last Bowel Movement Date: 05/08/22  Current diet:  ADULT DIET Regular  Passing flatus: YES  Tolerating current diet: YES       Pain Management:   Patient states pain is manageable on current regimen: YES    Skin:  Gómez Score: 20  Interventions: float heels, increase time out of bed, PT/OT consult and internal/external urinary devices    Patient Safety:  Fall Score:  Total Score: 1  Interventions: bed/chair alarm, assistive device (walker, cane, etc), gripper socks and pt to call before getting OOB  High Fall Risk: Yes    Length of Stay:  Expected LOS: - - -  Actual LOS: [address]ona Jones, JOLENE

## 2022-05-11 ENCOUNTER — TELEPHONE (OUTPATIENT)
Dept: FAMILY MEDICINE CLINIC | Age: 32
End: 2022-05-11

## 2022-05-11 VITALS
WEIGHT: 253.09 LBS | DIASTOLIC BLOOD PRESSURE: 74 MMHG | BODY MASS INDEX: 47.78 KG/M2 | SYSTOLIC BLOOD PRESSURE: 116 MMHG | OXYGEN SATURATION: 99 % | RESPIRATION RATE: 18 BRPM | TEMPERATURE: 98.2 F | HEART RATE: 90 BPM | HEIGHT: 61 IN

## 2022-05-11 PROBLEM — N39.0 UTI (URINARY TRACT INFECTION): Status: RESOLVED | Noted: 2022-05-04 | Resolved: 2022-05-11

## 2022-05-11 PROBLEM — R65.20 SEVERE SEPSIS (HCC): Status: RESOLVED | Noted: 2022-05-04 | Resolved: 2022-05-11

## 2022-05-11 PROBLEM — A41.9 SEVERE SEPSIS (HCC): Status: RESOLVED | Noted: 2022-05-04 | Resolved: 2022-05-11

## 2022-05-11 PROBLEM — N20.9 UROLITHIASIS: Status: RESOLVED | Noted: 2022-05-04 | Resolved: 2022-05-11

## 2022-05-11 LAB
A PHAGOCYTOPH IGG TITR SER IF: NEGATIVE {TITER}
A PHAGOCYTOPH IGM TITR SER IF: NEGATIVE {TITER}
ANION GAP SERPL CALC-SCNC: 7 MMOL/L (ref 5–15)
BACTERIA SPEC CULT: NORMAL
BUN SERPL-MCNC: 13 MG/DL (ref 6–20)
BUN/CREAT SERPL: 17 (ref 12–20)
CALCIUM SERPL-MCNC: 9 MG/DL (ref 8.5–10.1)
CHLORIDE SERPL-SCNC: 106 MMOL/L (ref 97–108)
CO2 SERPL-SCNC: 24 MMOL/L (ref 21–32)
CREAT SERPL-MCNC: 0.78 MG/DL (ref 0.55–1.02)
E CHAFFEENSIS IGG TITR SER IF: NEGATIVE {TITER}
E CHAFFEENSIS IGM TITR SER IF: NEGATIVE {TITER}
ERYTHROCYTE [DISTWIDTH] IN BLOOD BY AUTOMATED COUNT: 15.5 % (ref 11.5–14.5)
GLUCOSE SERPL-MCNC: 98 MG/DL (ref 65–100)
HCT VFR BLD AUTO: 37.9 % (ref 35–47)
HGB BLD-MCNC: 12.1 G/DL (ref 11.5–16)
MCH RBC QN AUTO: 27.4 PG (ref 26–34)
MCHC RBC AUTO-ENTMCNC: 31.9 G/DL (ref 30–36.5)
MCV RBC AUTO: 85.7 FL (ref 80–99)
NRBC # BLD: 0 K/UL (ref 0–0.01)
NRBC BLD-RTO: 0 PER 100 WBC
PLATELET # BLD AUTO: 260 K/UL (ref 150–400)
PMV BLD AUTO: 10 FL (ref 8.9–12.9)
POTASSIUM SERPL-SCNC: 3.7 MMOL/L (ref 3.5–5.1)
RBC # BLD AUTO: 4.42 M/UL (ref 3.8–5.2)
SERVICE CMNT-IMP: NORMAL
SODIUM SERPL-SCNC: 137 MMOL/L (ref 136–145)
WBC # BLD AUTO: 10 K/UL (ref 3.6–11)

## 2022-05-11 PROCEDURE — 74011250637 HC RX REV CODE- 250/637: Performed by: INTERNAL MEDICINE

## 2022-05-11 PROCEDURE — 74011250637 HC RX REV CODE- 250/637: Performed by: HOSPITALIST

## 2022-05-11 PROCEDURE — 74011250636 HC RX REV CODE- 250/636: Performed by: INTERNAL MEDICINE

## 2022-05-11 PROCEDURE — 80048 BASIC METABOLIC PNL TOTAL CA: CPT

## 2022-05-11 PROCEDURE — 36415 COLL VENOUS BLD VENIPUNCTURE: CPT

## 2022-05-11 PROCEDURE — 85027 COMPLETE CBC AUTOMATED: CPT

## 2022-05-11 RX ORDER — LEVOFLOXACIN 750 MG/1
750 TABLET ORAL EVERY 24 HOURS
Qty: 10 TABLET | Refills: 0 | Status: SHIPPED | OUTPATIENT
Start: 2022-05-11 | End: 2022-05-21

## 2022-05-11 RX ORDER — DOXYCYCLINE HYCLATE 100 MG
100 TABLET ORAL EVERY 12 HOURS
Qty: 24 TABLET | Refills: 0 | Status: ON HOLD | OUTPATIENT
Start: 2022-05-11 | End: 2022-07-22 | Stop reason: SDUPTHER

## 2022-05-11 RX ORDER — HYDROMORPHONE HYDROCHLORIDE 2 MG/1
1 TABLET ORAL
Qty: 9 TABLET | Refills: 0 | Status: SHIPPED | OUTPATIENT
Start: 2022-05-11 | End: 2022-05-16

## 2022-05-11 RX ORDER — LEVOFLOXACIN 750 MG/1
750 TABLET ORAL EVERY 24 HOURS
Status: DISCONTINUED | OUTPATIENT
Start: 2022-05-11 | End: 2022-05-11 | Stop reason: HOSPADM

## 2022-05-11 RX ADMIN — HYDROMORPHONE HYDROCHLORIDE 1 MG: 2 TABLET ORAL at 04:08

## 2022-05-11 RX ADMIN — ENOXAPARIN SODIUM 30 MG: 100 INJECTION SUBCUTANEOUS at 04:08

## 2022-05-11 RX ADMIN — HYDROMORPHONE HYDROCHLORIDE 1 MG: 2 TABLET ORAL at 00:07

## 2022-05-11 RX ADMIN — DOXYCYCLINE HYCLATE 100 MG: 100 TABLET, COATED ORAL at 08:21

## 2022-05-11 RX ADMIN — HYDROMORPHONE HYDROCHLORIDE 1 MG: 2 TABLET ORAL at 08:21

## 2022-05-11 RX ADMIN — LEVOFLOXACIN 750 MG: 750 TABLET, FILM COATED ORAL at 10:50

## 2022-05-11 RX ADMIN — TAMSULOSIN HYDROCHLORIDE 0.4 MG: 0.4 CAPSULE ORAL at 08:21

## 2022-05-11 NOTE — TELEPHONE ENCOUNTER
[id number] [place] nurse called patient is being discharged     Pls call patient if follow up visit is needed.

## 2022-05-11 NOTE — PROGRESS NOTES
Hospitalist Progress Note    NAME: Fabio Eckert   :  1990   MRN:  [id number]       Assessment / Plan:  L hydron  due to nephrolithiasis s/p cystoscopy and stent placement  Severe sepsis now resolved  E coli  UTI  GNR bacteremia  -Urine cultures growing E. coli   - Bld CX pending GNR  -Urology s/o and f/u as OP  - afebrile now  -pwe ID can go home  on p.o. Levaquin  Till   - she  wants p.o. Dilaudid at d/c.       Persistent fever-now resolved  Suspected tickborne illness  -She reported  on  that she pulled a tick about 1 week ago  -Continue empiric doxycycline for 14 days per ID  -Appreciate ID recommendations. Tick studies pending f/u at ID office    Sharp chest/back pain  Elevated D-dimer  -CTA chest was done which did not reveal any evidence of pulmonary embolism  -She reports that her chest pain is improved. She is on room air    Hypokalemia  -Replaced with KCl. Code status: Full  Prophylaxis: Lovenox  Recommended Disposition: Home w/Family today     Subjective:     Chief Complaint / Reason for Physician Visit follow-up for UTI  Patient seen and examined for the first time, chart was reviewed. Patient afebrile. Patient asking for Dilaudid for discharge for pain control. No other acute issues reported to me by staff at this time. Objective:     VITALS:   Last 24hrs VS reviewed since prior progress note.  Most recent are:  Patient Vitals for the past 24 hrs:   Temp Pulse Resp BP SpO2   22 0725 98.2 °F (36.8 °C) 90 18 116/74 99 %   22 0413 98.2 °F (36.8 °C) 81 17 (!) 101/55 98 %   22 0012 98.3 °F (36.8 °C) 75 12 99/64 100 %   05/10/22 1916 98.2 °F (36.8 °C) 80 14 107/76 99 %   05/10/22 1510 98.1 °F (36.7 °C) 80 11 134/74 100 %   05/10/22 1054 98.4 °F (36.9 °C) 71 18 124/79 99 %       Intake/Output Summary (Last 24 hours) at 2022 0947  Last data filed at 2022 0725  Gross per 24 hour   Intake 1301.65 ml   Output 400 ml   Net 901.65 ml        PHYSICAL EXAM:  General: WD, WN.  Aler     EENT:    MMM  Resp:    No accessory muscle use  CV:  Regular  rhythm,    GI:  Soft,  obese  Neurologic:  Alert and oriented X 3, normal speech,   Psych:    anxious and  agitated easily when talking about pain medications  Skin:    No jaundice        Current Facility-Administered Medications:     HYDROmorphone (DILAUDID) tablet 1 mg, 1 mg, Oral, Q4H PRN, Isha Azar MD, 1 mg at 05/11/22 0821    cefTRIAXone (ROCEPHIN) 2 g in 0.9% sodium chloride (MBP/ADV) 50 mL MBP, 2 g, IntraVENous, Q24H, Shruthi Daniels MD, Last Rate: 100 mL/hr at 05/10/22 1201, 2 g at 05/10/22 1201    doxycycline (VIBRA-TABS) tablet 100 mg, 100 mg, Oral, Q12H, Franky Daniels MD, 100 mg at 05/11/22 5701    enoxaparin (LOVENOX) injection 30 mg, 30 mg, SubCUTAneous, Q12H, JonnyBrant MD, 30 mg at 05/11/22 0408    tamsulosin (FLOMAX) capsule 0.4 mg, 0.4 mg, Oral, DAILY, Brant Cho MD, 0.4 mg at 05/11/22 1925    sodium chloride (NS) flush 5-40 mL, 5-40 mL, IntraVENous, Q8H, Roz Resendiz MD, 10 mL at 05/10/22 2149    sodium chloride (NS) flush 5-40 mL, 5-40 mL, IntraVENous, PRN, Roz Resendiz MD    polyethylene glycol (MIRALAX) packet 17 g, 17 g, Oral, DAILY PRN, Roz Resendiz MD    ondansetron (ZOFRAN ODT) tablet 4 mg, 4 mg, Oral, Q8H PRN **OR** ondansetron (ZOFRAN) injection 4 mg, 4 mg, IntraVENous, Q6H PRN, Roz Resendiz MD, 4 mg at 05/07/22 1445  ________________________________________________________________________      LABS:    Recent Labs     05/11/22  0416 05/10/22  0358 05/09/22  0038   WBC 10.0 7.1 6.8   HGB 12.1 11.5 11.1*   HCT 37.9 35.9 34.8*    193 177     Recent Labs     05/11/22  0416 05/10/22  0358 05/09/22  0038    138 138   K 3.7 3.8 3.3*    107 106   CO2 24 24 25   GLU 98 89 101*   BUN 13 11 10   CREA 0.78 0.68 0.89   CA 9.0 8.8 8.5       Signed: Bk Real MD

## 2022-05-11 NOTE — PROGRESS NOTES
1900: Bedside shift change report given to Devyn Wilkins (oncoming nurse) by Nirav Lei RN (offgoing nurse). Report included the following information SBAR, Kardex, ED Summary, Intake/Output, MAR, Recent Results and Cardiac Rhythm NSR. End of Shift Note    Bedside shift change report given to Rusty Romero (oncoming nurse) by Denice Kennedy RN (offgoing nurse). Report included the following information SBAR, Kardex, ED Summary, Intake/Output, MAR, Recent Results and Cardiac Rhythm NSR    Shift worked:  [id number]     Shift summary and any significant changes:     dilaudid PO given overnight, SEE MAR. Patient states it hurts more when she pees. Concerns for physician to address:  clarify pain management when getting d/c     Zone phone for oncoming shift:          Activity:  Activity Level: Up ad ariana  Number times ambulated in hallways past shift: 0  Number of times OOB to chair past shift: 0    Cardiac:   Cardiac Monitoring: Yes      Cardiac Rhythm: Sinus Rhythm    Access:   Current line(s): PIV     Genitourinary:   Urinary status: voiding    Respiratory:   O2 Device: None (Room air)  Chronic home O2 use?: NO  Incentive spirometer at bedside: NO       GI:  Last Bowel Movement Date: 05/10/22 (per pt)  Current diet:  ADULT DIET Regular  Passing flatus: YES  Tolerating current diet: YES       Pain Management:   Patient states pain is manageable on current regimen: YES    Skin:  Gómez Score: 22  Interventions: float heels, increase time out of bed, PT/OT consult and internal/external urinary devices    Patient Safety:  Fall Score:  Total Score: 1  Interventions: bed/chair alarm, assistive device (walker, cane, etc), gripper socks, pt to call before getting OOB and stay with me (per policy)  High Fall Risk: Yes    Length of Stay:  Expected LOS: - - -  Actual LOS: [place], RN

## 2022-05-11 NOTE — DISCHARGE SUMMARY
Hospitalist Discharge Summary     Patient ID:  Jacinto Ernandez  [id number]  5 y.o.  1990 5/4/2022    PCP on record: Brie Rosas MD    Admit date: 5/4/2022  Discharge date and time: 5/11/2022    DISCHARGE DIAGNOSIS:  E. coli bacteremia  E. coli UTI  Left hydronephrosis needing stent  Suspected tick bite      CONSULTATIONS:  IP CONSULT TO UROLOGY  IP CONSULT TO INFECTIOUS DISEASES  IP CONSULT TO HOSPITALIST    Excerpted HPI from H&P of Barry Steward MD:  CHIEF COMPLAINT: Belly pain     HISTORY OF PRESENT ILLNESS:     Sumanth Gamez is a 33 y.o. female with no significant past medical history presented with left flank pain since this morning. The pain was sudden in onset severe in intensity, associate with some blood in the urine. She went to \[facility]\"" for this and was transferred here. She underwent cystoscopy and left ureteral stent earlier this evening. She denies any chest pain, shortness of breath, lower extremity edema or tenderness. She mentions having some loose bowel movement this morning. I evaluated her after her transfer from PACU and she feels drowsy, continues to have left flank pain.     We were asked to admit for work up and evaluation of the above problems. ______________________________________________________________________  DISCHARGE SUMMARY/HOSPITAL COURSE:  for full details see H&P, daily progress notes, labs, consult notes. L hydron  due to nephrolithiasis s/p cystoscopy and stent placement  Severe sepsis now resolved  E coli  UTI  GNR bacteremia  -Urine cultures growing E. coli   - Bld CX pending GNR  -Urology s/o and f/u as OP  - afebrile now  -pwe ID can go home  on p.o. Levaquin  Till 5/20  - she  wants p.o. Dilaudid at d/c.       Persistent fever-now resolved  Suspected tickborne illness  -She reported  on 5/8 that she pulled a tick about 1 week ago  -Continue empiric doxycycline for 14 days per ID  -Appreciate ID recommendations.   Tick studies pending f/u at ID office    Sharp chest/back pain  Elevated D-dimer  -CTA chest was done which did not reveal any evidence of pulmonary embolism  -She reports that her chest pain is improved. She is on room air    Hypokalemia  -Replaced with KCl. Code status: Full  Prophylaxis: Lovenox  Recommended Disposition: Home w/Family today        _______________________________________________________________________  Patient seen and examined by me on discharge day. Patient maximized inpatient benefit stay, cleared by ID and urology to go home. Patient having some pain issues asking for Dilaudid for discharge. Advised to follow-up with PCP, ID and urology as outpatient. Patient verbalized understanding at this time. _______________________________________________________________________  DISCHARGE MEDICATIONS:   Current Discharge Medication List      START taking these medications    Details   HYDROmorphone (DILAUDID) 2 mg tablet Take 0.5 Tablets by mouth every six (6) hours as needed for Pain for up to 5 days. Max Daily Amount: 4 mg. Qty: 9 Tablet, Refills: 0  Start date: 5/11/2022, End date: 5/16/2022    Associated Diagnoses: Bilateral nephrolithiasis      levoFLOXacin (LEVAQUIN) 750 mg tablet Take 1 Tablet by mouth every twenty-four (24) hours for 10 doses. Qty: 10 Tablet, Refills: 0  Start date: 5/11/2022, End date: 5/21/2022      doxycycline (VIBRA-TABS) 100 mg tablet Take 1 Tablet by mouth every twelve (12) hours. Qty: 24 Tablet, Refills: 0  Start date: 5/11/2022         CONTINUE these medications which have NOT CHANGED    Details   tamsulosin (Flomax) 0.4 mg capsule Take 1 Capsule by mouth daily for 7 doses. Qty: 7 Capsule, Refills: 0  Start date: 5/4/2022, End date: 5/11/2022      ibuprofen (MOTRIN) 600 mg tablet Take 1 Tablet by mouth every six (6) hours as needed for Pain.   Qty: 20 Tablet, Refills: 0  Start date: 5/4/2022      ondansetron (ZOFRAN ODT) 4 mg disintegrating tablet Take 1 Tablet by mouth every eight (8) hours as needed for Nausea.   Qty: 6 Tablet, Refills: 0  Start date: 5/4/2022         STOP taking these medications       HYDROcodone-acetaminophen (Norco) 5-325 mg per tablet Comments:   Reason for Stopping:                 Patient Follow Up Instructions:   Diet as before  Activity as before  Check labs at PCPs office 1 week  Follow-up with ID for further results on tickborne illnesses  Return to ER or call 911 immediately if symptoms recur or get worse    Follow-up Information     Follow up With Specialties Details Why [address] [facility]  On 5/12/2022 10 am follow up with Dr. Tasia Falk at our [facility] office [address] Roxy Kirk MD Infectious Disease Physician, Internal Medicine Physician In 2 weeks for post hospital discharge follow-up [address] [facility]  [phone]      PCP in 1 week check CBC/BMP            ________________________________________________________________    Risk of deterioration: Low    Condition at Discharge:  Stable  __________________________________________________________________    Disposition  Home with family, no needs    ____________________________________________________________________    Code Status: Full Code  ___________________________________________________________________      Total time in minutes spent coordinating this discharge  36  minutes    Signed:  Jj Stephens MD

## 2022-05-11 NOTE — PROGRESS NOTES
Problem: Falls - Risk of  Goal: *Absence of Falls  Description: Document Shannon Mcburney Fall Risk and appropriate interventions in the flowsheet.   Outcome: Progressing Towards Goal  Note: Fall Risk Interventions:  Mobility Interventions: Assess mobility with egress test,Patient to call before getting OOB         Medication Interventions: Bed/chair exit alarm,Patient to call before getting OOB,Teach patient to arise slowly    Elimination Interventions: Call light in reach,Patient to call for help with toileting needs

## 2022-05-11 NOTE — DISCHARGE INSTRUCTIONS
Diet as before  Activity as before  Check labs at PCPs office 1 week  Follow-up with ID for further results on tickborne illnesses  Return to ER or call 911 immediately if symptoms recur or get worse

## 2022-05-11 NOTE — PROGRESS NOTES
Transition of Care Plan:     RUR: 7% - low risk  YANN: 5/11  Disposition: Home today  Follow up appointments: PCP: NP Candance Keyser/5/16 at 9:30 AM for New PCP appt. Urology: 5/812/22 at 10 AM  ID: Frances: Office will call if need to follow up  DME needed: No needs identified. Has crutches at home.   Transportation at [address] [place] [address] is here. DC before noon  [place] or means to access home:  Shirley has access.      IM Medicare Letter: N/A  Is patient a BCPI-A Bundle: No                     If yes, was Bundle Letter given?: No   Is patient a  and connected with the [address] [place] [address]  If yes, was [place] transfer form completed and [facility] notified? Caregiver Contact: Julius Pulido, [phone]  Discharge Caregiver contacted prior to discharge? Pt is alert and oriented. Pt is updating fiancee. Care Conference needed?: Not indicated at this time.     NO further CM needs. Care Management Interventions  PCP Verified by CM: Yes (No PCP. Pt declined for CM to assist. )  Mode of Transport at Discharge:  Other (see comment) (shirley)  Transition of Care Consult (CM Consult): Discharge Planning  Discharge Durable Medical Equipment: No  Physical Therapy Consult: No  Occupational Therapy Consult: No  Speech Therapy Consult: No  Support Systems: Spouse/Significant Other  Confirm Follow Up Transport: Family  The Plan for Transition of Care is Related to the Following Treatment Goals : Home  Discharge Location  Patient Expects to be Discharged to[de-identified] Home       Gael [address] [place] [address]

## 2022-05-11 NOTE — PROGRESS NOTES
0700: Bedside shift change report given to Gigi Li RN (oncoming nurse) by Ciarra Toribio RN (offgoing nurse). Report included the following information SBAR, Kardex, Intake/Output, MAR and Recent Results. 0945: Dr. Ruth Sharma at bedside, will place d/c order. 1100: I have reviewed discharge instructions with the patient and spouse. The patient and spouse verbalized understanding. Endurance catheter removed at this time. Patient dressed and will be transported via wheelchair to main entrance.

## 2022-05-12 LAB
R RICKETTSI IGG SER QL IA: NEGATIVE
R RICKETTSI IGM SER-ACNC: 1.33 INDEX (ref 0–0.89)

## 2022-05-13 LAB
B BURGDOR DNA SPEC QL NAA+PROBE: NEGATIVE
SPECIMEN SOURCE: NORMAL

## 2022-05-16 ENCOUNTER — OFFICE VISIT (OUTPATIENT)
Dept: FAMILY MEDICINE CLINIC | Age: 32
End: 2022-05-16
Payer: COMMERCIAL

## 2022-05-16 ENCOUNTER — TELEPHONE (OUTPATIENT)
Dept: FAMILY MEDICINE CLINIC | Age: 32
End: 2022-05-16

## 2022-05-16 VITALS
SYSTOLIC BLOOD PRESSURE: 112 MMHG | WEIGHT: 250 LBS | DIASTOLIC BLOOD PRESSURE: 83 MMHG | HEART RATE: 96 BPM | TEMPERATURE: 97.3 F | RESPIRATION RATE: 20 BRPM | OXYGEN SATURATION: 97 % | HEIGHT: 61 IN | BODY MASS INDEX: 47.2 KG/M2

## 2022-05-16 DIAGNOSIS — Z11.59 SCREENING FOR VIRAL DISEASE: ICD-10-CM

## 2022-05-16 DIAGNOSIS — K62.5 RECTAL BLEEDING: ICD-10-CM

## 2022-05-16 DIAGNOSIS — Z09 HOSPITAL DISCHARGE FOLLOW-UP: Primary | ICD-10-CM

## 2022-05-16 DIAGNOSIS — Q62.11 HYDRONEPHROSIS WITH URETEROPELVIC JUNCTION (UPJ) OBSTRUCTION: ICD-10-CM

## 2022-05-16 DIAGNOSIS — H61.23 BILATERAL IMPACTED CERUMEN: ICD-10-CM

## 2022-05-16 DIAGNOSIS — R10.84 GENERALIZED ABDOMINAL PAIN: ICD-10-CM

## 2022-05-16 PROCEDURE — 69209 REMOVE IMPACTED EAR WAX UNI: CPT

## 2022-05-16 PROCEDURE — 99204 OFFICE O/P NEW MOD 45 MIN: CPT

## 2022-05-16 PROCEDURE — 1111F DSCHRG MED/CURRENT MED MERGE: CPT

## 2022-05-16 NOTE — PROGRESS NOTES
1. \"Have you been to the ER, urgent care clinic since your last visit? NP  Hospitalized since your last visit? \" Yes, [facility] 05/2022   2. \"Have you seen or consulted any other health care providers outside of the [address] [place] [address] since your last visit? \" No     3. For patients aged 39-70: Has the patient had a colonoscopy / FIT/ Cologuard? NA - based on age      If the patient is female:    4. For patients aged 41-77: Has the patient had a mammogram within the past 2 years? NA - based on age or sex      11. For patients aged 21-65: Has the patient had a pap smear? Yes - [place] present.  Rooming MA/LPN to request most recent results

## 2022-05-16 NOTE — PATIENT INSTRUCTIONS
Earwax Blockage: Care Instructions  Your Care Instructions     Earwax is a natural substance that protects the ear canal. Normally, earwax drains from the ears and does not cause problems. Sometimes earwax builds up and hardens. Earwax blockage (also called cerumen impaction) can cause some loss of hearing and pain. When wax is tightly packed, you will need to have your doctor remove it. Follow-up care is a key part of your treatment and safety. Be sure to make and go to all appointments, and call your doctor if you are having problems. It's also a good idea to know your test results and keep a list of the medicines you take. How can you care for yourself at home? · Do not try to remove earwax with cotton swabs, fingers, or other objects. This can make the blockage worse and damage the eardrum. · If your doctor recommends that you try to remove earwax at home:  ? Soften and loosen the earwax with warm mineral oil. You also can try hydrogen peroxide mixed with an equal amount of room temperature water. Place 2 drops of the fluid, warmed to body temperature, in the ear two times a day for up to 5 days. ? Once the wax is loose and soft, all that is usually needed to remove it from the ear canal is a gentle, warm shower. Direct the water into the ear, then tip your head to let the earwax drain out. Dry your ear thoroughly with a hair dryer set on low. Hold the dryer several inches from your ear. ? If the warm mineral oil and shower do not work, use an over-the-counter wax softener. Read and follow all instructions on the label. After using the wax softener, use an ear syringe to gently flush the ear. Make sure the flushing solution is body temperature. Cool or hot fluids in the ear can cause dizziness. When should you call for help? Call your doctor now or seek immediate medical care if:    · Pus or blood drains from your ear.     · Your ears are ringing or feel full.     · You have a loss of hearing. Watch closely for changes in your health, and be sure to contact your doctor if:    · You have pain or reduced hearing after 1 week of home treatment.     · You have any new symptoms, such as nausea or balance problems. Where can you learn more? Go to http://www.gray.com/  Enter Q495 in the search box to learn more about \"Earwax Blockage: Care Instructions. \"  Current as of: July 1, 2021               Content Version: 13.2  © 2006-2022 [employer]. Care instructions adapted under license by [employer] ??? (which disclaims liability or warranty for this information). If you have questions about a medical condition or this instruction, always ask your healthcare professional. [address] any warranty or liability for your use of this information. Lower Gastrointestinal Bleeding: Care Instructions  Your Care Instructions     The digestive or gastrointestinal tract goes from the mouth to the anus. It is often called the GI tract. Bleeding in the lower GI tract can happen anywhere in your small or large intestine. It can also happen in your rectum or anus. In some cases, it is caused by an infection, cancer, or inflammatory bowel disease. Or it may be caused by hemorrhoids, diverticulitis, or clotting problems. Light bleeding may not cause any symptoms at first. But if you continue to bleed for a while, you may feel very weak or tired. Sudden, heavy bleeding means you need to see a doctor right away. This kind of bleeding can be very dangerous. But it can usually be cured or controlled. The doctor may do some tests to find the cause of your bleeding. Follow-up care is a key part of your treatment and safety. Be sure to make and go to all appointments, and call your doctor if you are having problems. It's also a good idea to know your test results and keep a list of the medicines you take. How can you care for yourself at home?   · Be safe with medicines. Take your medicines exactly as prescribed. Call your doctor if you think you are having a problem with your medicine. You will get more details on the specific medicines your doctor prescribes. · Do not take aspirin or other anti-inflammatory medicines, such as naproxen (Aleve) or ibuprofen (Advil, Motrin), without talking to your doctor first. Ask your doctor if it is okay to use acetaminophen (Tylenol). · Do not drink alcohol. · The bleeding may make you lose iron. So it's important to eat foods that have a lot of iron. These include red meat, shellfish, poultry, and eggs. They also include beans, raisins, whole-grain breads, and leafy green vegetables. If you want help planning meals, you can meet with a dietitian. When should you call for help? Call 911 anytime you think you may need emergency care. For example, call if:    · You have sudden, severe belly pain.     · You vomit blood or what looks like coffee grounds.     · You passed out (lost consciousness).     · Your stools are maroon or very bloody. Call your doctor now or seek immediate medical care if:    · You are dizzy or lightheaded, or you feel like you may faint.     · Your stools are black and look like tar, or they have streaks of blood.     · You have belly pain.     · You vomit or have nausea. Watch closely for changes in your health, and be sure to contact your doctor if you do not get better as expected. Where can you learn more? Go to http://www.gray.com/  Enter [id number] in the search box to learn more about \"Lower Gastrointestinal Bleeding: Care Instructions. \"  Current as of: July 1, 2021               Content Version: 13.2  © 8044-9415 [employer]. Care instructions adapted under license by [employer] (which disclaims liability or warranty for this information).  If you have questions about a medical condition or this instruction, always ask your healthcare professional. [address] any warranty or liability for your use of this information. Rectal Bleeding: Care Instructions  Your Care Instructions     Rectal bleeding in small amounts is common. You may see red spotting on toilet paper or drops of blood in the toilet. Rectal bleeding has many possible causes, from something as minor as hemorrhoids to something as serious as colon cancer. You may need more tests to find the cause of your bleeding. Follow-up care is a key part of your treatment and safety. Be sure to make and go to all appointments, and call your doctor if you are having problems. It's also a good idea to know your test results and keep a list of the medicines you take. How can you care for yourself at home? · Avoid aspirin and other nonsteroidal anti-inflammatory drugs (NSAIDs), such as ibuprofen (Advil, Motrin) and naproxen (Aleve). They can cause you to bleed more. Ask your doctor if you can take acetaminophen (Tylenol). Read and follow all instructions on the label. · Use a stool softener that contains bran or psyllium. You can save money by buying bran or psyllium (available in bulk at most health food stores) and sprinkling it on foods or stirring it into fruit juice. You can also use a product such as Metamucil or Citrucel. · Take your medicines exactly as directed. Call your doctor if you think you are having a problem with your medicine. When should you call for help? Call 911 anytime you think you may need emergency care. For example, call if:    · You passed out (lost consciousness). Call your doctor now or seek immediate medical care if:    · You have new or worse pain.     · You have new or worse bleeding from the rectum.     · You are dizzy or light-headed, or you feel like you may faint. Watch closely for changes in your health, and be sure to contact your doctor if:    · You cannot pass stools or gas.     · You do not get better as expected. Where can you learn more? Go to http://www.gray.com/  Enter C958 in the search box to learn more about \"Rectal Bleeding: Care Instructions. \"  Current as of: September 8, 2021               Content Version: 13.2  © 2006-2022 [employer]. Care instructions adapted under license by [employer] (which disclaims liability or warranty for this information). If you have questions about a medical condition or this instruction, always ask your healthcare professional. [place] [address] any warranty or liability for your use of this information. Urinary Tract Infection (UTI) in Women: Care Instructions  Overview     A urinary tract infection, or UTI, is a general term for an infection anywhere between the kidneys and the urethra (where urine comes out). Most UTIs are bladder infections. They often cause pain or burning when you urinate. UTIs are caused by bacteria and can be cured with antibiotics. Be sure to complete your treatment so that the infection does not get worse. Follow-up care is a key part of your treatment and safety. Be sure to make and go to all appointments, and call your doctor if you are having problems. It's also a good idea to know your test results and keep a list of the medicines you take. How can you care for yourself at home? · Take your antibiotics as directed. Do not stop taking them just because you feel better. You need to take the full course of antibiotics. · Drink extra water and other fluids for the next day or two. This will help make the urine less concentrated and help wash out the bacteria that are causing the infection. (If you have kidney, heart, or liver disease and have to limit fluids, talk with your doctor before you increase the amount of fluids you drink.)  · Avoid drinks that are carbonated or have caffeine. They can irritate the bladder. · Urinate often. Try to empty your bladder each time.   · To relieve pain, take a hot bath or lay a heating pad set on low over your lower belly or genital area. Never go to sleep with a heating pad in place. To prevent UTIs  · Drink plenty of water each day. This helps you urinate often, which clears bacteria from your system. (If you have kidney, heart, or liver disease and have to limit fluids, talk with your doctor before you increase the amount of fluids you drink.)  · Urinate when you need to. · If you are sexually active, urinate right after you have sex. · Change sanitary pads often. · Avoid douches, bubble baths, feminine hygiene sprays, and other feminine hygiene products that have deodorants. · After going to the bathroom, wipe from front to back. When should you call for help? Call your doctor now or seek immediate medical care if:    · Symptoms such as fever, chills, nausea, or vomiting get worse or appear for the first time.     · You have new pain in your back just below your rib cage. This is called flank pain.     · There is new blood or pus in your urine.     · You have any problems with your antibiotic medicine. Watch closely for changes in your health, and be sure to contact your doctor if:    · You are not getting better after taking an antibiotic for 2 days.     · Your symptoms go away but then come back. Where can you learn more? Go to http://www.gray.com/  Enter C414 in the search box to learn more about \"Urinary Tract Infection (UTI) in Women: Care Instructions. \"  Current as of: October 18, 2021               Content Version: 13.2  © 2006-2022 [employer]. Care instructions adapted under license by [employer] (which disclaims liability or warranty for this information). If you have questions about a medical condition or this instruction, always ask your healthcare professional. [address] any warranty or liability for your use of this information.

## 2022-05-16 NOTE — ADT AUTH CERT NOTES
BEAN THOMAS   1990    [id number]              As requested this is the log of the first fax that was sent showing timely submission. Even though it was faxed to wrong number. Patient Demographics  Patient Name  Benitez Hannon  Legal  Sex  Female    17  [birth year]  Address  [address] [facility] [address]  Phone  [phone] [facility])  Emergency Contact(s)  Name Relation Home Work Amy Abernathy Life Partner [phone]  Utilization Review  [address]  FACILITY NPI :[id number]  [address]  [facility] 2 PROGRESSIVE CARE   [facility] [address]  [address] [place] [address] 30: [phone]  Fax: [phone]  Patient Name :Silviano Matt   : 1990 (31 yrs)  MRN : [id number]  Patient Mailing Address [place] [address] [42] , [address]  . Insurance Plan Payor: BLUE CROSS / Plan: [facility] PPO /  Product Type: PPO /  Primary Coverage Subscriber ID : [id number]  Current Patient Class : INPATIENT  Admit Date : 2022  REQUESTED LEVEL OF CARE: INPATIENT [101]  Carmela Standard 2022 15:17  Utilization Review (continued)  Diagnosis : Severe sepsis (HCC);UTI (urinary tract infection); Urolithiasis  ICD10 Code : Severe sepsis ([facility] [address] [A41.9, R65.20]  UTI (urinary tract infection) [N39.0]  Urolithiasis [N20.9]  Current Room and Bed   Admitting and Attending Info:  Admitting Provider : Iman Christiansen MD NPI: [id number]  Admitting Provider Phone.  [phone]  Admitting Provider Address: [address] [place] [address], [address]  Attending Provider Marielena Rendon MD [id number]  Attending Provider Address: [address] [address]  [address]  [address]  Attending Provider Phone: Attending phys phone: [phone]  Utilization Reviews  Urinary Tract Infection (UTI) - Care Day 2 (2022) by Deborah Tillman  Review  Entered Review Status  2022 14:36 Completed  Criteria Review  Care Day: 2 Care Date: 5/5/2022 Level of Care: Step Down  Guideline Day 2  Level Of Care  ( ) Floor  5/5/2022 14:36:20 EDT by Letty No  Clinical Status  (X) * Hypotension absent  5/5/2022 14:36:20 EDT by Efraín Goodwin  99/55, HR up to 121, 22, RA sat 97%  (X) * Mental status at baseline  5/5/2022 14:36:20 EDT by Efraín Goodwin  baseline  (X) * Afebrile or fever improved  5/5/2022 14:36:20 EDT by Efraín Goodwin  temp 98.9  Utilization Review (continued)  Urinary Tract Infection (UTI) - Care Day 1 (5/4/2022) by Paul Barnard  (X) * Vomiting absent or improved  5/5/2022 14:36:20 EDT by Efraín Goodwin  no vomiting  Activity  (X) * Ambulatory  5/5/2022 14:36:20 EDT by Efraín Goodwin  act as sussy  Routes  (X) IV fluids  5/5/2022 14:36:20 EDT by Efraín Goodwin  NS at 75cc/hr  (X) IV medications  5/5/2022 14:36:20 EDT by Efraín Goodwin  IV abx and IV pain meds as doc  (X) Advance diet as tolerated  5/5/2022 14:36:20 EDT by Efraín Goodwin  reg diet  Interventions  (X) * Reversible urinary system abnormality (eg, obstruction, abscess) absent,  addressed, or to be treated at next level of care  5/5/2022 14:36:20 EDT by Eren Holliday is POD 1 s/p cysto and stent placement. Urine cx sent 5/4 resulted positive for GNR. Bld cx from 5/4 at [address] (repeat) both resulted GNR  (X) WBC  5/5/2022 14:36:20 EDT by Efraín Goodwin  WBC 32.3.  (X) Renal function tests  5/5/2022 14:36:20 EDT by Efraín Goodwin  Lactic acid 2.8. Chl 110, gluc 147, creat 1.13, phuong 7.8. Medications  (X) Antibiotics  5/5/2022 14:36:20 EDT by Efraín Goodwin  Rocephin 1g IV Q24H  (X) Possible analgesics  5/5/2022 14:36:20 EDT by Efraín Goodwin  morphine 2mg IV Q4H PRN x4 and dc at 1413.  New order for dilaudid 1mg IV Q4H  PRN started  * Milestone  Additional Notes  Plan:  - discussed with pt need for definitive stone treatment and stent removal. Follow up  arranged.  -follow cultures, target abx therapy once able.  -pain management of stent colic  Review  Entered Review Status  5/5/2022 09:55 Completed  Utilization Review (continued)  Criteria Review  Care Day: 1 Care Date: 5/4/2022 Level of Care: Step Down  Guideline Day 1  Level Of Care  ( ) Floor  5/5/2022 09:55:51 EDT by Jose Moreno  Clinical Status  (X) * Clinical Indications met  5/5/2022 09:55:51 EDT by Dilshad Bustos old female transferred to this facility from other facility where presented for acute  onset lt flank pain. Tachycardic, hypotensive, febrile. RR 20, RA sat 95%. 115.7kg. To  OR urgently at 2045 now s/p cysto, lt ureteral stent  (X) Fever  5/5/2022 09:55:51 EDT by Mila Dnig  Febrile up to 102.4. Tachycardic with HR sust 117-141, hypotensive with BP down to  87/46  (X) Possible dysuria, chills, and flank pain  5/5/2022 09:55:51 EDT by Mila Ding  Lt flank pain 10/10 associated with n/v. 12 lead EKG: sinus tachy, rate 133. Routes  (X) IV fluids  5/5/2022 09:55:51 EDT by Mila Ding  NS IVF bolus 500cc x2, 1000cc x1 and then at 75cc/hr  (X) IV medications  5/5/2022 09:55:51 EDT by Mila Ding  Zofran 4mg IV x1  (X) Diet as tolerated  5/5/2022 09:55:51 EDT by Primus Silvius diet post op  Interventions  (X) Urinalysis and urine culture  5/5/2022 09:55:51 EDT by Mila Ding  UA: hazy, 30prot, lg bld, nitrite POSITIVE, 1+bact. Urine cx pend  (X) Possible blood cultures  5/5/2022 09:55:51 EDT by Alix Carrillo cx from 5/4 at transferring facility POSITIVE for GNR 4/4 bottles. Bld cx x2 more  sent at 1915  (X) Renal function tests, CBC  5/5/2022 09:55:51 EDT by Mila Ding  WBC 16.6. LA 2.5. Na 135, creat 1.28, phuong 8.0,  (X) Possible CT, ultrasound, or other imaging test  5/5/2022 09:55:51 EDT by Manus Angle  CT abd/pelvis: mild hydroureteronephrosis on lt w/prox lt ureteral calculus 4mm. CXR:  no acute cp disease.   Medications  (X) Antibiotics  5/5/2022 09:55:51 EDT by Rachel Berry, Martha  Rocephin 2g IV x1 and 1g IV Q24H  (X) Possible analgesics  5/5/2022 09:55:51 EDT by Viridiana Capps  Dilaudid 1mg IV x1, toradol 30mg IV x1, Benadryl 12.5mg IV x1  * Milestone  Additional Notes  Utilization Review (continued)  Urinary Tract Infection (UTI) - Clinical Indications for Admission to Inpatient Care  by Sarath Maya / Plan:  Severe sepsis  D/t UTI  Obstructing Left ureteral stone  Mild hydroureteronephrosis  Leukocytosis, Lactic acidosis on presentation  CT abdomen:  Mild hydroureteronephrosis on the left with proximal left ureteral calculus  measuring 4 mm in size. There are right renal calculi that are nonobstructive. S/p urgent cystoscopy and stenting by urology this evening  S/p 30 cc/kg fluids at outside hospital  Phenylephrine ordered in OR but never started. Recent BP in low 100s, remains tachycardiac, will give 1 more bolus and c/w iv fluids  Follow up urine, blood culture  Follow up urology  Repeat Lactate until less than 2  Continue with iv Rocephin  Monitor in step down unit for now, if needs pressors, would consider transfer to ICU  PHYSICAL EXAM:  General: Alert, cooperative, no distress, appears stated age. HEENT: Atraumatic, anicteric sclerae, pink conjunctivae  No oral ulcers, mucosa moist, throat clear, dentition fair  Neck: Supple, symmetrical, thyroid: non tender  Lungs: Clear to auscultation bilaterally. No Wheezing or Rhonchi. No rales. Chest wall: No tenderness No Accessory muscle use. Heart: Regular rhythm, No murmur No edema  Abdomen: Left flank tenderness  Extremities: No cyanosis. No clubbing,  Skin turgor normal, Capillary refill normal, Radial dial pulse 2+  Skin: Not pale. Not Jaundiced No rashes  Psych: Good insight. Not depressed. Not anxious or agitated. Neurologic: EOMs intact. No facial asymmetry. No aphasia or slurred speech. Symmetrical strength, Sensation grossly intact. Alert and oriented X 4.   Review  Entered Review Status  5/5/2022 09:41 Completed  Criteria Review  Clinical Indications for Admission to Inpatient Care  Most Recent : Hector Landers Most Recent Date: 5/5/2022 09:41:31 EDT  (X) Admission is indicated for 1 or more of the following (1) (2) (3) (4) (5) (6) (7) (8):  (X) Persistence or worsening of clinical finding (eg, fever, pain, dehydration, vomiting)  despite  observation care  5/5/2022 09:41:31 EDT by Hector Landers  Utilization Review (continued)  H&P Notes  H&P by Mandy Gupta MD at 05/04/22 2211 documented on ED to [facility]-  Admission (Current) from 5/4/2022 in [facility] 2 PROGRESSIVE CARE  Lft flank pain  (X) Parenteral antibiotics needed beyond observation care (eg, patient cannot take oral  medication,  known or suspected pathogen resistance to oral agents)  5/5/2022 09:41:31 EDT by Brian Dorman OR and IV abx  Notes:  5/5/2022 09:41:31 EDT by Hector Landers  Subject: Additional Clinical Information  * 31yr old female with no sig PMH to ED c/o left flank pain with acute onset at 0930  today. Pt screaming out in pain and writhing in bed in ed, rating pain at 10/10, lactic  acidosis of 2.4. Transferred to this ED from another facility for urologic management of  nephrolithiais sec to obstructing kidney stone 4mm. Endorses palpitations. Admit  Author: Mandy Gupta MD  Author Type: Physician Filed: 05/04/22 2239  Note Status: Addendum Cosign: Cosign Not  Required  Date of  Service:  05/04/22 2211  : Mandy Gupta MD (Physician)  Prior  Versions:  1. Mandy Gupta MD (Physician) at 05/04/22 2234 - Addendum  2. Mandy Gupta MD (Physician) at 05/04/22 2234 - Addendum  3. Mandy Gupta MD (Physician) at 05/04/22 2234 - Addendum  4. Mandy Gupta MD (Physician) at 05/04/22 2231 - Addendum  5. Mandy Gupta MD (Physician) at 05/04/22 2230 - Addendum  6. Mandy Gupta MD (Physician) at 05/04/22 2229 - Addendum  7.  Mandy Gupta MD (Physician) at 05/04/22 2228 - Signed  [facility]  Hospitalist Admission Note  NAME: Yvette Alfonso  : 1990  MRN: [id number]  Date/Time: 2022 10:12 PM  Patient PCP: Brie Rosas MD  Utilization Review (continued)  ______________________________________________________________________  Given the patient's current clinical presentation, I have a high level of concern for  decompensation if discharged from the emergency department. Complex decision making  was performed, which includes reviewing the patient's available past medical records,  laboratory results, and x-ray films. My assessment of this patient's clinical condition and my plan of care is as follows. Assessment / Plan:  CHIEF COMPLAINT: Belly pain  HISTORY OF PRESENT ILLNESS:  Ana Paula Schmidt is a 33 y.o. female with no significant past medical history presented with  left flank pain since this morning. The pain was sudden in onset severe in intensity,  associate with some blood in the urine. She went to [facility] [place] [address] for this and was transferred  here. She underwent cystoscopy and left ureteral stent earlier this evening. She denies  any chest pain, shortness of breath, lower extremity edema or tenderness. She mentions  having some loose bowel movement this morning. I evaluated her after her transfer from PACU and she feels drowsy, continues to have left  flank pain. We were asked to admit for work up and evaluation of the above problems. Severe sepsis  D/t UTI  Obstructing Left ureteral stone  Mild hydroureteronephrosis  Leukocytosis, Lactic acidosis on presentation  CT abdomen:  Mild hydroureteronephrosis on the left with proximal left ureteral calculus  measuring 4 mm in size. There are right renal calculi that are nonobstructive. S/p urgent cystoscopy and stenting by urology this evening  S/p 30 cc/kg fluids at outside hospital  Phenylephrine ordered in OR but never started.   Recent BP in low 100s, remains tachycardiac, will give 1 more bolus and c/w iv fluids  Follow up urine, blood culture  Follow up urology  Repeat Lactate until less than 2  Continue with iv Rocephin  Monitor in step down unit for now, if needs pressors, would consider transfer to  ICU  Code Status: Full code  Surrogate Decision Maker: Her fiancee  DVT Prophylaxis: No ac for now, as complains of hematuria  GI Prophylaxis: not indicated  Baseline: From home, independent  Subjective:  Past Medical History:  Utilization Review (continued)  History reviewed. No pertinent surgical history. History reviewed. No pertinent family history. No Known Allergies  REVIEW OF SYSTEMS:  Diagnosis Date   Nephrolithiasis  Social History  Tobacco Use   Smoking status: Not on file   Smokeless tobacco: Not on file  Substance Use Topics   Alcohol use: Not on file  Prior to Admission medications  Medication Sig Start  Date  End  Date  Taking? Authorizing  Provider  tamsulosin  (Flomax) 0.4 mg  capsule  Take 1  Capsule by  mouth daily  for 7 doses. 5/4/22 5/11/2  2  Patricio Orozco MD  ibuprofen  (MOTRIN) 600 mg  tablet  Take 1  Tablet by  mouth every  six (6) hours  as needed  for Pain. 5/4/22 Patricio Orozco MD  HYDROcodoneacetaminophen  (Norco) 5-325 mg  per tablet  Take 1  Tablet by  mouth every  six (6) hours  as needed  for Pain for  up to 3 days. Max Daily  Amount: 4  Tablets. 5/4/22 5/7/22 Patricio Orozco MD  ondansetron  (ZOFRAN ODT) 4  mg disintegrating  tablet  Take 1  Tablet by  mouth every  eight (8)  hours as  needed for  Nausea.   5/4/22 Patricio Orozco MD  Utilization Review (continued)  Total of 12 systems reviewed as follows:  POSITIVE= underlined text Negative = text not underlined  General: fever, chills, sweats, generalized weakness, weight loss/gain,  loss of appetite  Eyes: blurred vision, eye pain, loss of vision, double vision  ENT: rhinorrhea, pharyngitis  Respiratory: cough, sputum production, SOB, HAMMOND, wheezing, pleuritic pain  Cardiology: chest pain, palpitations, orthopnea, PND, edema, syncope  Gastrointestinal: abdominal pain , N/V, diarrhea, dysphagia, constipation, bleeding  Genitourinary: frequency, urgency, dysuria, hematuria, incontinence  Muskuloskeletal : arthralgia, myalgia, back pain  Hematology: easy bruising, nose or gum bleeding, lymphadenopathy  Dermatological: rash, ulceration, pruritis, color change / jaundice  Endocrine: hot flashes or polydipsia  Neurological: headache, dizziness, confusion, focal weakness, paresthesia,  Speech difficulties, memory loss, gait difficulty  Psychological: Feelings of anxiety, depression, agitation  VITALS:  Visit Vitals  PHYSICAL EXAM:  General: Alert, cooperative, no distress, appears stated age. HEENT: Atraumatic, anicteric sclerae, pink conjunctivae  No oral ulcers, mucosa moist, throat clear, dentition fair  Neck: Supple, symmetrical, thyroid: non tender  Lungs: Clear to auscultation bilaterally. No Wheezing or Rhonchi. No rales. Chest wall: No tenderness No Accessory muscle use. Heart: Regular rhythm, No murmur No edema  Abdomen: Left flank tenderness  Extremities: No cyanosis. No clubbing,  Skin turgor normal, Capillary refill normal, Radial dial pulse 2+  Skin: Not pale. Not Jaundiced No rashes  Psych: Good insight. Not depressed. Not anxious or agitated. Neurologic: EOMs intact. No facial asymmetry. No aphasia or slurred speech. Symmetrical strength, Sensation grossly intact.  Alert and oriented X 4.  _______________________________________________________________________  Care Plan discussed with:  Objective:  BP (!) 97/59  Pulse (!) 124  Temp (!) 100.8 °F (38.2 °C)  Resp 20  Ht 5' 1\" (1.549 m)  Wt 115.7 kg (255 lb)  SpO2 97%  BMI 48.18 kg/m²  Comments  Patient y  Family  Utilization Review (continued)  _______________________________________________________________________  Expected Disposition:  ________________________________________________________________________  TOTAL TIME: 37 Minutes  Critical Care Provided Minutes non procedure based  ________________________________________________________________________  Signed: Jayshree Ace MD  Procedures: see electronic medical records for all procedures/Xrays and details  which were not copied into this note but were reviewed prior to creation of Plan. LAB DATA REVIEWED:  RN y  Care Manager  Consultant:  Home with Family y  HH/PT/OT/RN  SNF/LTC  KEDAR  Comments  Reviewed previous records  >50% of visit spent in  counseling and  coordination of care  Discussion with patient and/or family and questions  answered  Recent Results  Recent Results (from the past 24 hour(s))  CBC WITH AUTOMATED DIFF  Collection Time: 05/04/22 11:00 AM  Result Value Ref Range  WBC 12.4 (H) 3.6 - 11.0 K/uL  RBC 4.87 3.80 - 5.20 M/uL  HGB 13.7 11.5 - 16.0 g/dL  HCT 41.3 35.0 - 47.0 %  MCV 84.8 80.0 - 99.0 FL  MCH 28.1 26.0 - 34.0 PG  MCHC 33.2 30.0 - 36.5 g/dL  RDW 14.6 (H) 11.5 - 14.5 %  PLATELET 402 038 - 175 K/uL  MPV 10.2 8.9 - 12.9 FL  NRBC 0.0 0  WBC  ABSOLUTE NRBC 0.00 0.00 - 0.01 K/uL  NEUTROPHILS 70 32 - 75 %  LYMPHOCYTES 21 12 - 49 %  MONOCYTES 8 5 - 13 %  EOSINOPHILS 1 0 - 7 %  BASOPHILS 0 0 - 1 %  IMMATURE  GRANULOCYTES  0 0.0 - 0.5 %  Utilization Review (continued)  ABS. NEUTROPHILS 8.5 (H) 1.8 - 8.0 K/UL  ABS. LYMPHOCYTES 2.6 0.8 - 3.5 K/UL  ABS. MONOCYTES 1.0 0.0 - 1.0 K/UL  ABS. EOSINOPHILS 0.2 0.0 - 0.4 K/UL  ABS. BASOPHILS 0.1 0.0 - 0.1 K/UL  ABS. IMM.  GRANS. 0.0 0.00 - 0.04 K/UL  DF AUTOMATED  METABOLIC PANEL, COMPREHENSIVE  Collection Time: 05/04/22 11:00 AM  Result Value Ref Range  Sodium 139 136 - 145 mmol/L  Potassium 4.0 3.5 - 5.1 mmol/L  Chloride 105 97 - 108 mmol/L  CO2 21 21 - 32 mmol/L  Anion gap 13 5 - 15 mmol/L  Glucose 122 (H) 65 - 100 mg/dL  BUN 14 6 - 20 MG/DL  Creatinine 0.98 0.55 - 1.02 MG/DL  BUN/Creatinine ratio 14 12 - 20  GFR est AA >60 >60 ml/min/1.73m2  GFR est non-AA >60 >60 ml/min/1.73m2  Calcium 8.3 (L) 8.5 - 10.1 MG/DL  Bilirubin, total 0.2 0.2 - 1.0 MG/DL  ALT (SGPT) 57 12 - 78 U/L  AST (SGOT) 33 15 - 37 U/L  Alk. phosphatase 119 (H) 45 - 117 U/L  Protein, total 7.8 6.4 - 8.2 g/dL  Albumin 3.7 3.5 - 5.0 g/dL  Globulin 4.1 (H) 2.0 - 4.0 g/dL  A-G Ratio 0.9 (L) 1.1 - 2.2  LIPASE  Collection Time: 05/04/22 11:00 AM  Result Value Ref Range  Lipase 111 73 - 393 U/L  SAMPLES BEING HELD  Collection Time: 05/04/22 11:00 AM  Result Value Ref Range  SAMPLES BEING HELD 1SST,1BLUE  COMMENT  Add-on orders for these samples will be processed based on acceptable  specimen integrity and analyte stability, which may vary by analyte. URINALYSIS W/ RFLX MICROSCOPIC  Collection Time: 05/04/22 12:49 PM  Result Value Ref Range  Color YELLOW/STRAW  Appearance HAZY (A) CLEAR  Specific gravity 1.018 1.003 - 1.030  pH (UA) 6.0 5.0 - 8.0  Protein 30 (A) NEG mg/dL  Glucose Negative NEG mg/dL  Ketone Negative NEG mg/dL  Bilirubin Negative NEG  Utilization Review (continued)  Blood LARGE (A) NEG  Urobilinogen 0.2 0.2 - 1.0 EU/dL  Nitrites Positive (A) NEG  Leukocyte Esterase Negative NEG  URINE MICROSCOPIC ONLY  Collection Time: 05/04/22 12:49 PM  Result Value Ref Range  WBC 5-10 0 - 4 /hpf  RBC  0 - 5 /hpf  Epithelial cells FEW FEW /lpf  Bacteria 1+ (A) NEG /hpf  LACTIC ACID  Collection Time: 05/04/22 1:37 PM  Result Value Ref Range  Lactic acid 2.5 (HH) 0.4 - 2.0 MMOL/L  METABOLIC PANEL, COMPREHENSIVE  Collection Time: 05/04/22 6:29 PM  Result Value Ref Range  Sodium 135 (L) 136 - 145 mmol/L  Potassium 3.9 3.5 - 5.1 mmol/L  Chloride 104 97 - 108 mmol/L  CO2 23 21 - 32 mmol/L  Anion gap 8 5 - 15 mmol/L  Glucose 95 65 - 100 mg/dL  BUN 15 6 - 20 MG/DL  Creatinine 1.28 (H) 0.55 - 1.02 MG/DL  BUN/Creatinine ratio 12 12 - 20  GFR est AA 58 (L) >60 ml/min/1.73m2  GFR est non-AA 48 (L) >60 ml/min/1.73m2  Calcium 8.0 (L) 8.5 - 10.1 MG/DL  Bilirubin, total 0.7 0.2 - 1.0 MG/DL  ALT (SGPT) 53 12 - 78 U/L  AST (SGOT) 28 15 - 37 U/L  Alk.  phosphatase 112 45 - 117 U/L  Protein, total 7.2 6.4 - 8.2 g/dL  Albumin 3.5 3.5 - 5.0 g/dL  Globulin 3.7 2.0 - 4.0 g/dL  A-G Ratio 0.9 (L) 1.1 - 2.2  TROPONIN-HIGH SENSITIVITY  Collection Time: 05/04/22 6:29 PM  Result Value Ref Range  Troponin-High Sensitivity <4 0 - 51 ng/L  COVID-19 RAPID TEST  Collection Time: 05/04/22 6:29 PM  Result Value Ref Range  Specimen source Nasopharyngeal  COVID-19 rapid test Not detected NOTD  INFLUENZA A+B VIRAL AGS  Collection Time: 05/04/22 6:29 PM  Result Value Ref Range  Influenza A Antigen Negative NEG  Influenza B Antigen Negative NEG  Utilization Review (continued)  EKG, 12 LEAD, INITIAL  Collection Time: 05/04/22 6:29 PM  Result Value Ref Range  Ventricular Rate 133 BPM  Atrial Rate 133 BPM  P-R Interval 112 ms  QRS Duration 70 ms  Q-T Interval 378 ms  QTC Calculation (Bezet) 562 ms  Calculated R Axis 26 degrees  Calculated T Axis 40 degrees  Diagnosis  Sinus tachycardia  Low voltage QRS  No previous ECGs available  POC LACTIC ACID  Collection Time: 05/04/22 6:48 PM  Result Value Ref Range  Lactic Acid (POC) 4.08 (HH) 0.40 - 2.00 mmol/L  CBC WITH AUTOMATED DIFF  Collection Time: 05/04/22 7:46 PM  Result Value Ref Range  WBC 16.6 (H) 3.6 - 11.0 K/uL  RBC 4.17 3.80 - 5.20 M/uL  HGB 11.8 11.5 - 16.0 g/dL  HCT 36.6 35.0 - 47.0 %  MCV 87.8 80.0 - 99.0 FL  MCH 28.3 26.0 - 34.0 PG  MCHC 32.2 30.0 - 36.5 g/dL  RDW 15.0 (H) 11.5 - 14.5 %  PLATELET 565 426 - 465 K/uL  MPV 9.8 8.9 - 12.9 FL  NRBC 0.0 0  WBC  ABSOLUTE NRBC 0.00 0.00 - 0.01 K/uL  NEUTROPHILS 79 (H) 32 - 75 %  BAND NEUTROPHILS 19 %  LYMPHOCYTES 2 (L) 12 - 49 %  MONOCYTES 0 (L) 5 - 13 %  EOSINOPHILS 0 0 - 7 %  BASOPHILS 0 0 - 1 %  IMMATURE  GRANULOCYTES  0 0.0 - 0.5 %  ABS. NEUTROPHILS 16.3 (H) 1.8 - 8.0 K/UL  ABS. LYMPHOCYTES 0.3 (L) 0.8 - 3.5 K/UL  ABS. MONOCYTES 0.0 0.0 - 1.0 K/UL  ABS. EOSINOPHILS 0.0 0.0 - 0.4 K/UL  ABS. BASOPHILS 0.0 0.0 - 0.1 K/UL  ABS. IMM.  GRANS. 0.0 0.00 - 0.04 K/UL  DF MANUAL  PLATELET COMMENTS CLUMPED PLATELETS  RBC COMMENTS NORMOCYTIC,  Utilization Review (continued)  NORMOCHROMIC  WBC COMMENTS VACUOLATED POLYS  POC LACTIC ACID  Collection Time: 05/04/22 7:49 PM  Result Value Ref Range  Lactic Acid (POC) 3.48 (HH) 0.40 - 2.00 mmol/L  HCG URINE, QL. - POC  Collection Time: 05/04/22 8:13 PM  Result Value Ref Range  Pregnancy test,urine (POC) Negative NEG

## 2022-05-16 NOTE — TELEPHONE ENCOUNTER
Hope called to inform Ada per Dr. France Sylvester, he will not be able to do the Colonoscopy Referral for Ms Oj Brown. Her BMI number of 47.24 is too much of a risk performing the procedure at [facility] due to not having an ICU unit  at the hospital.  They are recommending [phone] for the Referral.  Ravi Rosado was informed verbally stated OK, message sent as well for FYI.

## 2022-05-16 NOTE — PROGRESS NOTES
Chief Complaint   Patient presents with   [facility] patient   [facility] Follow Up     [facility]       HPI:     is a 33 y.o. female who presents as a new patient for hospital f/u. 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 She was recently admitted to [address] [place] [address] for sepsis stemming from UTI and left hydronephrosis secondary to renal stone; she underwent renal stent placement and was discharged on 5/11 with Levaquin therapy, as well doxycycline for positive Rickettsial titers following tick bite. She continues to experience left flank pain for which is taking PO Dilaudid as she continues to nurse her youngest child. She has f/u with [facility] on Thursday and anticipates removal of the stent. She reports a history of renal stones with pregnancy, requiring lithotripsy. She reports recurrent rectal bleeding since the birth of her youngest child; she was seen in the ED for this about 8 months ago with a normal workup and dx internal hemorrhoids. She reports occasional lower abdominal pain that lasts a few minutes to hours; she notes that the bleeding occurs a few days following these episodes of pain and lasts for a day or two. The blood is bright red to dark red and is in the toilet bowl, but not the stool; no dark or tarry stools. She has never had a colonoscopy. No Known Allergies    Current Outpatient Medications   Medication Sig    HYDROmorphone (DILAUDID) 2 mg tablet Take 0.5 Tablets by mouth every six (6) hours as needed for Pain for up to 5 days. Max Daily Amount: 4 mg.  levoFLOXacin (LEVAQUIN) 750 mg tablet Take 1 Tablet by mouth every twenty-four (24) hours for 10 doses.  doxycycline (VIBRA-TABS) 100 mg tablet Take 1 Tablet by mouth every twelve (12) hours.  ibuprofen (MOTRIN) 600 mg tablet Take 1 Tablet by mouth every six (6) hours as needed for Pain.     ondansetron (ZOFRAN ODT) 4 mg disintegrating tablet Take 1 Tablet by mouth every eight (8) hours as needed for Nausea. No current facility-administered medications for this visit. Past Medical History:   Diagnosis Date    Nephrolithiasis        Family History   Problem Relation Age of Onset    Cancer Mother         Cervical Cancer    Heart Disease Father     Diabetes Father     Other Father         Back problems, DDD       Review of Systems   Constitutional: Negative. Negative for chills, fever and malaise/fatigue. HENT: Negative. Eyes: Negative. Respiratory: Negative. Negative for cough and shortness of breath. Cardiovascular: Negative. Negative for chest pain, palpitations and leg swelling. Gastrointestinal: Negative. Negative for abdominal pain, nausea and vomiting. Genitourinary: Positive for flank pain. Skin: Negative. Neurological: Negative. Negative for dizziness and headaches. Endo/Heme/Allergies: Negative. Psychiatric/Behavioral: Negative. Negative for depression. The patient is not nervous/anxious. /83 (BP 1 Location: Left lower arm, BP Patient Position: Sitting, BP Cuff Size: Adult)   Pulse 96   Temp 97.3 °F (36.3 °C) (Core)   Resp 20   Ht 5' 1\" (1.549 m)   Wt 250 lb (113.4 kg)   SpO2 97%   BMI 47.24 kg/m²     Wt Readings from Last 3 Encounters:   05/16/22 250 lb (113.4 kg)   05/11/22 253 lb 1.4 oz (114.8 kg)   05/04/22 255 lb (115.7 kg)       3 most recent PHQ Screens 5/16/2022   Little interest or pleasure in doing things Not at all   Feeling down, depressed, irritable, or hopeless Not at all   Total Score PHQ 2 0       Physical Exam  Vitals and nursing note reviewed. Constitutional:       General: She is not in acute distress. Appearance: Normal appearance. She is obese. HENT:      Head: Normocephalic and atraumatic. Mouth/Throat:      Mouth: Mucous membranes are moist.      Pharynx: Oropharynx is clear.    Eyes: Extraocular Movements: Extraocular movements intact. Conjunctiva/sclera: Conjunctivae normal.      Pupils: Pupils are equal, round, and reactive to light. Neck:      Vascular: No carotid bruit. Cardiovascular:      Rate and Rhythm: Normal rate and regular rhythm. Pulses: Normal pulses. Heart sounds: Normal heart sounds. No murmur heard. No friction rub. No gallop. Pulmonary:      Effort: Pulmonary effort is normal.      Breath sounds: Normal breath sounds. No wheezing, rhonchi or rales. Abdominal:      General: Bowel sounds are normal.      Palpations: Abdomen is soft. Comments: Exquisite left CVA tenderness to light touch   Musculoskeletal:         General: Normal range of motion. Cervical back: Normal range of motion and neck supple. Lymphadenopathy:      Cervical: No cervical adenopathy. Skin:     General: Skin is warm and dry. Neurological:      General: No focal deficit present. Mental Status: She is alert and oriented to person, place, and time. Mental status is at baseline. Psychiatric:         Mood and Affect: Mood normal.         Behavior: Behavior normal.         Thought Content: Thought content normal.         Judgment: Judgment normal.       ASSESSMENT AND PLAN:       ICD-10-CM ICD-9-CM    1. Hospital discharge follow-up  Z09 V67.59 [place] DISCHARGE MEDS RECONCILED W/ CURRENT OUTPATIENT MED LIST   2. Rectal bleeding  K62.5 569.3 REFERRAL TO GENERAL SURGERY      COLLECTION VENOUS BLOOD,VENIPUNCTURE      CBC WITH AUTOMATED DIFF      METABOLIC PANEL, COMPREHENSIVE      CBC WITH AUTOMATED DIFF      METABOLIC PANEL, COMPREHENSIVE   3. Generalized abdominal pain  R10.84 789.07 REFERRAL TO GENERAL SURGERY      COLLECTION VENOUS BLOOD,VENIPUNCTURE      CBC WITH AUTOMATED DIFF      METABOLIC PANEL, COMPREHENSIVE      CBC WITH AUTOMATED DIFF      METABOLIC PANEL, COMPREHENSIVE   4.  Hydronephrosis with ureteropelvic junction (UPJ) obstruction  Q62.11 591 COLLECTION VENOUS BLOOD,VENIPUNCTURE      CBC WITH AUTOMATED DIFF      METABOLIC PANEL, COMPREHENSIVE      CBC WITH AUTOMATED DIFF      METABOLIC PANEL, COMPREHENSIVE   5. Screening for viral disease  Z11.59 V73.99 COLLECTION VENOUS BLOOD,VENIPUNCTURE      HEPATITIS C AB      HEPATITIS C AB   6. Bilateral impacted cerumen  H61.23 380.4 REMOVAL IMPACTED CERUMEN IRRIGATION/LVG UNILAT       Reviewed records from recent hospitalization; will recheck labs today. Refer to general surgery for evaluation of rectal bleeding and abdominal pain; may need diagnostic colonoscopy. F/u urology as planned for stent removal and care of ongoing flank pain. Continue ABX until completed. Ceruminosis is noted. Wax is removed by syringing and manual debridement by nursing staff. Instructions for home care to prevent wax buildup are given. Medication Side Effects and Warnings were discussed with patient:  yes  Patient Labs were reviewed:  yes  Patient Past Records were reviewed:  yes      Patient aware of plan of care and verbalized understanding. Questions answered. RTC annually or sooner if needed. On this date 05/16/2022 I have spent 45 minutes reviewing previous notes, test results and face to face with the patient discussing the diagnosis and importance of compliance with the treatment plan as well as documenting on the day of the visit.     Malini Salcedo NP

## 2022-05-16 NOTE — TELEPHONE ENCOUNTER
Please  inform patient that rickettsia antibody came up mildly positive. This indicates she had a recent exposure. Complete therapy with   doxycycline.

## 2022-05-17 ENCOUNTER — TELEPHONE (OUTPATIENT)
Dept: INFECTIOUS DISEASES | Age: 32
End: 2022-05-17

## 2022-05-17 NOTE — TELEPHONE ENCOUNTER
Identified patient 2 identifiers verified. Patient aware of rickettsia results and to continue Doxycyline. Mo further questions or concerns.

## 2022-05-18 LAB
ALBUMIN SERPL-MCNC: 4.2 G/DL (ref 3.8–4.8)
ALBUMIN/GLOB SERPL: 1.4 {RATIO} (ref 1.2–2.2)
ALP SERPL-CCNC: 126 IU/L (ref 44–121)
ALT SERPL-CCNC: 53 IU/L (ref 0–32)
AST SERPL-CCNC: 36 IU/L (ref 0–40)
BASOPHILS # BLD AUTO: 0.1 X10E3/UL (ref 0–0.2)
BASOPHILS NFR BLD AUTO: 0 %
BILIRUB SERPL-MCNC: 0.2 MG/DL (ref 0–1.2)
BUN SERPL-MCNC: 14 MG/DL (ref 6–20)
BUN/CREAT SERPL: 18 (ref 9–23)
CALCIUM SERPL-MCNC: 9.7 MG/DL (ref 8.7–10.2)
CHLORIDE SERPL-SCNC: 102 MMOL/L (ref 96–106)
CO2 SERPL-SCNC: 19 MMOL/L (ref 20–29)
CREAT SERPL-MCNC: 0.78 MG/DL (ref 0.57–1)
EGFR: 103 ML/MIN/1.73
EOSINOPHIL # BLD AUTO: 0.3 X10E3/UL (ref 0–0.4)
EOSINOPHIL NFR BLD AUTO: 2 %
ERYTHROCYTE [DISTWIDTH] IN BLOOD BY AUTOMATED COUNT: 15.1 % (ref 11.7–15.4)
GLOBULIN SER CALC-MCNC: 3.1 G/DL (ref 1.5–4.5)
GLUCOSE SERPL-MCNC: 107 MG/DL (ref 65–99)
HCT VFR BLD AUTO: 41.6 % (ref 34–46.6)
HCV AB S/CO SERPL IA: <0.1 S/CO RATIO (ref 0–0.9)
HGB BLD-MCNC: 13.3 G/DL (ref 11.1–15.9)
IMM GRANULOCYTES # BLD AUTO: 0 X10E3/UL (ref 0–0.1)
IMM GRANULOCYTES NFR BLD AUTO: 0 %
LYMPHOCYTES # BLD AUTO: 2.7 X10E3/UL (ref 0.7–3.1)
LYMPHOCYTES NFR BLD AUTO: 24 %
MCH RBC QN AUTO: 28.1 PG (ref 26.6–33)
MCHC RBC AUTO-ENTMCNC: 32 G/DL (ref 31.5–35.7)
MCV RBC AUTO: 88 FL (ref 79–97)
MONOCYTES # BLD AUTO: 0.6 X10E3/UL (ref 0.1–0.9)
MONOCYTES NFR BLD AUTO: 5 %
NEUTROPHILS # BLD AUTO: 7.9 X10E3/UL (ref 1.4–7)
NEUTROPHILS NFR BLD AUTO: 69 %
PLATELET # BLD AUTO: 422 X10E3/UL (ref 150–450)
POTASSIUM SERPL-SCNC: 4.4 MMOL/L (ref 3.5–5.2)
PROT SERPL-MCNC: 7.3 G/DL (ref 6–8.5)
RBC # BLD AUTO: 4.73 X10E6/UL (ref 3.77–5.28)
SODIUM SERPL-SCNC: 140 MMOL/L (ref 134–144)
WBC # BLD AUTO: 11.6 X10E3/UL (ref 3.4–10.8)

## 2022-05-18 NOTE — PROGRESS NOTES
Your white blood cell count and liver enzymes are mildly elevated; this is likely related to your recent illness. I would like to repeat these tests in about 3 months, once you are fully recovered. No concerns with your kidneys, or electrolytes, and no anemia.

## 2022-05-19 NOTE — ADT AUTH CERT NOTES
Utilization Reviews         Urinary Tract Infection (UTI) - Care Day 7 (5/10/2022) by Sarah Tran       Review Entered Review Status   5/18/2022 13:31 Completed      Criteria Review      Care Day: 7 Care Date: 5/10/2022 Level of Care: Telemetry    Guideline Day 3    Level Of Care    ( ) Floor to discharge    5/18/2022 13:31:54 EDT by Berlin Greenberg to tele today    Clinical Status    (X) * Hemodynamic stability    5/18/2022 13:31:54 EDT by Marco Damico      HR 71, 124/79, 18, RA sat 99%. 116.8kg    ( ) * Mental status at baseline    ( ) * Antibiotic regimen for next level of care established    ( ) * Urine output adequate    ( ) * Renal function at baseline or acceptable for next level of care    ( ) * Pain absent or managed    5/18/2022 13:31:54 EDT by Marco Damico      IV pain meds changing to PO pain meds with adjustments made with timing after initial order sec to lack of adequate pain control    ( ) * Oral intake adequate    (X) * Afebrile or temperature acceptable for next level of care    5/18/2022 13:31:54 EDT by Marco Damico      Tmax 98.4    ( ) * Vomiting absent    ( ) * Discharge plans and education understood    Activity    ( ) * Ambulatory or acceptable for next level of care    Routes    (X) * Oral hydration    5/18/2022 13:31:54 EDT by Marco Damico      NS at 75cc/hr off at 1000. PO diet    ( ) * Oral medications or regimen acceptable for next level of care    5/18/2022 13:31:54 EDT by Marco Damico      IV abx and pain meds as doc. Flomax 0.4mg po qd, lovenox 30mg sq q12H, fioricet 1tab po q6H PRN x1 and order dc'd    ( ) * Oral diet or acceptable for next level of care    Interventions    (X) Renal function tests, urinalysis    5/18/2022 13:31:54 EDT by Marco Damico      CBC and BMP wnl.     Medications    (X) Antibiotics    5/18/2022 13:31:54 EDT by Marco Damico      Rocephin 2g IV Q24H, doxycycline 100mg po q12H    (X) Possible analgesics    5/18/2022 13:31:54 EDT by Hussein Spencer      Dilaudid 1mg IV Q4H PRN x2 chgn to 1mg po q6H PRN x1 changed again to 1mg po q4H PRN x2. Motrin 400mg po q6H PRN x2    * Milestone   Additional Notes   Assessment / Plan:   Left-sided hydronephrosis due to nephrolithiasis s/p cystoscopy and stent placement   Severe sepsis   Gram-negative kayli bacteremia   -S/p cystoscopy with stent placement   -Blood cultures are growing gram-negative bacteremia and not final yet   -Antibiotics switched by ID to ceftriaxone   -Urine cultures growing E. coli sensitive to ceftriaxone   -Urology is following   -We will discontinue all antipyretics and see if fever comes back   -Discharge in a.m. tomorrow if she remains afebrile   -She can be discharged on p.o. Levaquin    -Discontinue IV narcotics.  I started her on oxycodone but she does not want oxycodone and wants p.o. Dilaudid. Paul Villela is also requesting me that she wants to be discharged on p.o.  Dilaudid.   -She is very tearful and anxious while talking about pain medications.       Persistent fever   Suspected tickborne illness   -She reported  on 5/8 that she pulled a tick about 1 week ago   -Continue empiric doxycycline   -Appreciate ID recommendations. Shanti Balk studies pending       Sharp chest/back pain   Elevated D-dimer   -CTA chest was done which did not reveal any evidence of pulmonary embolism   -She reports that her chest pain is improved.  She is on room air       Hypokalemia   -Replaced with KCl.  Recheck in a.m              Urinary Tract Infection (UTI) - Care Day 6 (5/9/2022) by Sameer Putnam       Review Entered Review Status   5/18/2022 13:25 Completed      Criteria Review      Care Day: 6 Care Date: 5/9/2022 Level of Care: Step Down    Guideline Day 3    Clinical Status    (X) * Hemodynamic stability    5/18/2022 13:25:58 EDT by Hussein Spencer      102/58, 56, RR 10-18, RA sat 98%    ( ) * Mental status at baseline    ( ) * Antibiotic regimen for next level of care established 5/18/2022 13:25:58 EDT by Emily Gary still req IV abx for additional bacteremia-regimen changed today after ID consult    ( ) * Urine output adequate    ( ) * Renal function at baseline or acceptable for next level of care    ( ) * Pain absent or managed    5/18/2022 13:25:58 EDT by Para Riser      req max allowed IV pain meds and prn po pain meds    ( ) * Oral intake adequate    (X) * Afebrile or temperature acceptable for next level of care    5/18/2022 13:25:58 EDT by Para Riser      Tmax 99.4    ( ) * Vomiting absent    ( ) * Discharge plans and education understood    Activity    ( ) * Ambulatory or acceptable for next level of care    Routes    ( ) * Oral hydration    5/18/2022 13:25:58 EDT by Para Riser      NS at 125cc/hr dec to 75cc/hr at 0900    ( ) * Oral medications or regimen acceptable for next level of care    5/18/2022 13:25:58 EDT by Para Riser      IV pain meds/abx as doc. Flomax 0.4mg po qd, lovenox 30mg sq q12H, fioricet 1tab po q6H PRN x2, kcl 20meq po x2    ( ) * Oral diet or acceptable for next level of care    Interventions    (X) Renal function tests, urinalysis    5/18/2022 13:25:58 EDT by Para Riser      K 3.3, gluc 101. hgb 11.1, hct 32.8. CTA chest: no evidence pulm emboli    Medications    (X) Antibiotics    5/18/2022 13:25:58 EDT by Para Riser      Vibramycin 100mg IV Q12H x1 chg to po, Zosyn 3.375g IV Q8H x2 and dc. Rocephin 2g IV Q24H (NEW)    (X) Possible analgesics    5/18/2022 13:25:58 EDT by Para Riser      Dilaudid 1mg IV Q4H PRN x6 (MAX ALLOWED), motrin 400mg po q6H PRN x2    * Milestone   Additional Notes   Plan (Per ID)   Change to Ceftriaxone IV for E.coli bacteremia & UTI   DC Zosyn   Pt should be able to be transitioned to po Cipro/ Levaquin for DC if afebrile and asymptomatic. (No abdominal/flank pain) . Plan is for 2 weeks of antimicrobial therapy , end date 5/20.    But considering complicated past history, B/L nephrolithiasis, continued fever, pt may need to DC on Ceftriaxone iv     Doxycycline po for tick bite total 10-14 days. Await tick bite panel.        Urinary Tract Infection (UTI) - Care Day 5 (5/8/2022) by Ryan Sosa       Review Entered Review Status   5/18/2022 13:18 Completed      Criteria Review      Care Day: 5 Care Date: 5/8/2022 Level of Care: Step Down    Guideline Day 3    Clinical Status    (X) * Hemodynamic stability    5/18/2022 13:18:35 EDT by Yamini Lemus      HR 77, 122/74, RR 24, RA sat 99%. ( ) * Mental status at baseline    ( ) * Antibiotic regimen for next level of care established    ( ) * Urine output adequate    ( ) * Renal function at baseline or acceptable for next level of care    ( ) * Pain absent or managed    ( ) * Oral intake adequate    ( ) * Afebrile or temperature acceptable for next level of care    5/18/2022 13:18:35 EDT by Yamini Lemus      Fever 102.1 at 1131 tody, 101.8 at 1648    ( ) * Vomiting absent    ( ) * Discharge plans and education understood    Activity    ( ) * Ambulatory or acceptable for next level of care    Routes    ( ) * Oral hydration    5/18/2022 13:18:35 EDT by Yamini Lemus      NS at 125cc/hr    ( ) * Oral medications or regimen acceptable for next level of care    5/18/2022 13:18:35 EDT by Yamini Lemus      IV abx/pain meds as doc. FLomax 0.4mg po qd, lovenox 30mg sq q12H, fioricet 1tab po q6H PRN x3    ( ) * Oral diet or acceptable for next level of care    Interventions    ( ) Renal function tests, urinalysis    5/18/2022 13:18:35 EDT by Yamini Lemus      LABS: d dimer 4.69.  Influenza A++ RSV by PCR NEG    Medications    (X) Antibiotics    5/18/2022 13:18:35 EDT by Yamini Lemus      Vibramycin 100mg IV Q12H, Zosyn 3.375g IV Q8H    (X) Possible analgesics    5/18/2022 13:18:35 EDT by Yamini Lemus      Dilaudid 1mg IV Q4H PRN x5, motrin 400mg po q6H PRN x1    * Milestone   Additional Notes   Assessment / Plan:   Left-sided hydronephrosis due to nephrolithiasis s/p cystoscopy and stent placement   Severe sepsis   Gram-negative kayli bacteremia   -S/p cystoscopy with stent placement   -Blood cultures are growing gram-negative bacteremia.    -Continue Zosyn.  Follow repeat blood cultures   -Urine cultures growing E. coli sensitive to Zosyn.  Follow blood culture   -Continue Dilaudid and Norco for pain control.  Continue Flomax   -We will add ibuprofen for fever as well   - continue IV fluids   -Repeat CT of abdomen shows no evidence of renal abscess   -urology is following       Persistent fever   -She is now reporting that she removed a tick from her back 1 week ago   -Start empiric doxycycline   -Consult ID   -Urology is also considering repeating a CT renal protocol       Sharp chest/back pain   -She is currently on room air.  Reports pleuritic type of pain   -D-dimer is high but also has bacterial infection and sepsis   -If she becomes hypoxic, consider CTA   -She is at low risk for pulmonary embolism as she is on Lovenox

## 2022-07-12 ENCOUNTER — CLINICAL SUPPORT (OUTPATIENT)
Dept: FAMILY MEDICINE CLINIC | Age: 32
End: 2022-07-12

## 2022-07-12 DIAGNOSIS — Z11.59 SCREENING FOR VIRAL DISEASE: Primary | ICD-10-CM

## 2022-07-12 NOTE — PROGRESS NOTES
Patient presented to the office for a send out Covid 19 test for her surgery scheduled on Monday 07/18/2022. I did question the time frame, she stated, she was told she would need to have the test done either 07/11/20222 or 07/12/20222 the latest.  The Covid nasal swab was done for send out, pt tolerated OK.

## 2022-07-14 LAB
SARS-COV-2, NAA 2 DAY TAT: NORMAL
SARS-COV-2, NAA: NOT DETECTED

## 2022-07-22 ENCOUNTER — ANESTHESIA (OUTPATIENT)
Dept: SURGERY | Age: 32
End: 2022-07-22
Payer: COMMERCIAL

## 2022-07-22 ENCOUNTER — APPOINTMENT (OUTPATIENT)
Dept: CT IMAGING | Age: 32
End: 2022-07-22
Attending: FAMILY MEDICINE
Payer: COMMERCIAL

## 2022-07-22 ENCOUNTER — APPOINTMENT (OUTPATIENT)
Dept: GENERAL RADIOLOGY | Age: 32
End: 2022-07-22
Attending: UROLOGY
Payer: COMMERCIAL

## 2022-07-22 ENCOUNTER — ANESTHESIA EVENT (OUTPATIENT)
Dept: SURGERY | Age: 32
End: 2022-07-22
Payer: COMMERCIAL

## 2022-07-22 ENCOUNTER — HOSPITAL ENCOUNTER (EMERGENCY)
Age: 32
Discharge: HOME | End: 2022-07-22
Attending: FAMILY MEDICINE
Payer: COMMERCIAL

## 2022-07-22 ENCOUNTER — HOSPITAL ENCOUNTER (EMERGENCY)
Age: 32
Discharge: HOME | End: 2022-07-22
Attending: EMERGENCY MEDICINE
Payer: COMMERCIAL

## 2022-07-22 VITALS
DIASTOLIC BLOOD PRESSURE: 85 MMHG | HEART RATE: 72 BPM | OXYGEN SATURATION: 99 % | WEIGHT: 251.1 LBS | BODY MASS INDEX: 47.41 KG/M2 | HEIGHT: 61 IN | TEMPERATURE: 98.6 F | SYSTOLIC BLOOD PRESSURE: 137 MMHG | RESPIRATION RATE: 16 BRPM

## 2022-07-22 VITALS
SYSTOLIC BLOOD PRESSURE: 118 MMHG | BODY MASS INDEX: 47.58 KG/M2 | RESPIRATION RATE: 18 BRPM | OXYGEN SATURATION: 100 % | HEIGHT: 61 IN | DIASTOLIC BLOOD PRESSURE: 67 MMHG | WEIGHT: 252 LBS | HEART RATE: 78 BPM | TEMPERATURE: 98.2 F

## 2022-07-22 DIAGNOSIS — R52 INTRACTABLE PAIN: ICD-10-CM

## 2022-07-22 DIAGNOSIS — N13.2 HYDRONEPHROSIS CONCURRENT WITH AND DUE TO CALCULI OF KIDNEY AND URETER: Primary | ICD-10-CM

## 2022-07-22 DIAGNOSIS — N20.0 KIDNEY STONE: Primary | ICD-10-CM

## 2022-07-22 LAB
ALBUMIN SERPL-MCNC: 3.8 G/DL (ref 3.5–5)
ALBUMIN/GLOB SERPL: 0.9 {RATIO} (ref 1.1–2.2)
ALP SERPL-CCNC: 126 U/L (ref 45–117)
ALT SERPL-CCNC: 51 U/L (ref 12–78)
ANION GAP SERPL CALC-SCNC: 13 MMOL/L (ref 5–15)
APPEARANCE UR: CLEAR
AST SERPL-CCNC: 25 U/L (ref 15–37)
BACTERIA URNS QL MICRO: NEGATIVE /HPF
BASOPHILS # BLD: 0 K/UL (ref 0–0.1)
BASOPHILS NFR BLD: 0 % (ref 0–1)
BILIRUB SERPL-MCNC: 0.3 MG/DL (ref 0.2–1)
BILIRUB UR QL: NEGATIVE
BUN SERPL-MCNC: 14 MG/DL (ref 6–20)
BUN/CREAT SERPL: 12 (ref 12–20)
CALCIUM SERPL-MCNC: 9.7 MG/DL (ref 8.5–10.1)
CHLORIDE SERPL-SCNC: 102 MMOL/L (ref 97–108)
CO2 SERPL-SCNC: 25 MMOL/L (ref 21–32)
COLOR UR: ABNORMAL
COMMENT, HOLDF: NORMAL
CREAT SERPL-MCNC: 1.18 MG/DL (ref 0.55–1.02)
DIFFERENTIAL METHOD BLD: ABNORMAL
EOSINOPHIL # BLD: 0.2 K/UL (ref 0–0.4)
EOSINOPHIL NFR BLD: 1 % (ref 0–7)
EPITH CASTS URNS QL MICRO: ABNORMAL /LPF
ERYTHROCYTE [DISTWIDTH] IN BLOOD BY AUTOMATED COUNT: 13.9 % (ref 11.5–14.5)
GLOBULIN SER CALC-MCNC: 4.2 G/DL (ref 2–4)
GLUCOSE SERPL-MCNC: 140 MG/DL (ref 65–100)
GLUCOSE UR STRIP.AUTO-MCNC: NEGATIVE MG/DL
HCG UR QL: NEGATIVE
HCT VFR BLD AUTO: 41.1 % (ref 35–47)
HGB BLD-MCNC: 13.7 G/DL (ref 11.5–16)
HGB UR QL STRIP: ABNORMAL
IMM GRANULOCYTES # BLD AUTO: 0.1 K/UL (ref 0–0.04)
IMM GRANULOCYTES NFR BLD AUTO: 1 % (ref 0–0.5)
KETONES UR QL STRIP.AUTO: NEGATIVE MG/DL
LEUKOCYTE ESTERASE UR QL STRIP.AUTO: NEGATIVE
LYMPHOCYTES # BLD: 3.2 K/UL (ref 0.8–3.5)
LYMPHOCYTES NFR BLD: 18 % (ref 12–49)
MCH RBC QN AUTO: 28.2 PG (ref 26–34)
MCHC RBC AUTO-ENTMCNC: 33.3 G/DL (ref 30–36.5)
MCV RBC AUTO: 84.6 FL (ref 80–99)
MONOCYTES # BLD: 1.3 K/UL (ref 0–1)
MONOCYTES NFR BLD: 7 % (ref 5–13)
NEUTS SEG # BLD: 13.2 K/UL (ref 1.8–8)
NEUTS SEG NFR BLD: 73 % (ref 32–75)
NITRITE UR QL STRIP.AUTO: NEGATIVE
NRBC # BLD: 0 K/UL (ref 0–0.01)
NRBC BLD-RTO: 0 PER 100 WBC
PH UR STRIP: 6 [PH] (ref 5–8)
PLATELET # BLD AUTO: 360 K/UL (ref 150–400)
PMV BLD AUTO: 9.6 FL (ref 8.9–12.9)
POTASSIUM SERPL-SCNC: 4.1 MMOL/L (ref 3.5–5.1)
PROT SERPL-MCNC: 8 G/DL (ref 6.4–8.2)
PROT UR STRIP-MCNC: 100 MG/DL
RBC # BLD AUTO: 4.86 M/UL (ref 3.8–5.2)
RBC #/AREA URNS HPF: ABNORMAL /HPF (ref 0–5)
SAMPLES BEING HELD,HOLD: NORMAL
SODIUM SERPL-SCNC: 140 MMOL/L (ref 136–145)
SP GR UR REFRACTOMETRY: 1.03 (ref 1–1.03)
UROBILINOGEN UR QL STRIP.AUTO: 0.2 EU/DL (ref 0.2–1)
WBC # BLD AUTO: 18 K/UL (ref 3.6–11)
WBC URNS QL MICRO: ABNORMAL /HPF (ref 0–4)

## 2022-07-22 PROCEDURE — 36415 COLL VENOUS BLD VENIPUNCTURE: CPT

## 2022-07-22 PROCEDURE — 99285 EMERGENCY DEPT VISIT HI MDM: CPT

## 2022-07-22 PROCEDURE — 96365 THER/PROPH/DIAG IV INF INIT: CPT

## 2022-07-22 PROCEDURE — 96375 TX/PRO/DX INJ NEW DRUG ADDON: CPT

## 2022-07-22 PROCEDURE — C2617 STENT, NON-COR, TEM W/O DEL: HCPCS | Performed by: UROLOGY

## 2022-07-22 PROCEDURE — 74011250636 HC RX REV CODE- 250/636: Performed by: EMERGENCY MEDICINE

## 2022-07-22 PROCEDURE — 74011250636 HC RX REV CODE- 250/636: Performed by: FAMILY MEDICINE

## 2022-07-22 PROCEDURE — 76210000020 HC REC RM PH II FIRST 0.5 HR: Performed by: UROLOGY

## 2022-07-22 PROCEDURE — 96376 TX/PRO/DX INJ SAME DRUG ADON: CPT

## 2022-07-22 PROCEDURE — 81025 URINE PREGNANCY TEST: CPT

## 2022-07-22 PROCEDURE — 74176 CT ABD & PELVIS W/O CONTRAST: CPT

## 2022-07-22 PROCEDURE — 76060000032 HC ANESTHESIA 0.5 TO 1 HR: Performed by: UROLOGY

## 2022-07-22 PROCEDURE — 77030010509 HC AIRWY LMA MSK TELE -A: Performed by: NURSE ANESTHETIST, CERTIFIED REGISTERED

## 2022-07-22 PROCEDURE — 74011250636 HC RX REV CODE- 250/636: Performed by: ANESTHESIOLOGY

## 2022-07-22 PROCEDURE — C1769 GUIDE WIRE: HCPCS | Performed by: UROLOGY

## 2022-07-22 PROCEDURE — 85025 COMPLETE CBC W/AUTO DIFF WBC: CPT

## 2022-07-22 PROCEDURE — 74011000636 HC RX REV CODE- 636: Performed by: UROLOGY

## 2022-07-22 PROCEDURE — C1758 CATHETER, URETERAL: HCPCS | Performed by: UROLOGY

## 2022-07-22 PROCEDURE — 74011250637 HC RX REV CODE- 250/637: Performed by: UROLOGY

## 2022-07-22 PROCEDURE — 80053 COMPREHEN METABOLIC PANEL: CPT

## 2022-07-22 PROCEDURE — 74011000258 HC RX REV CODE- 258: Performed by: FAMILY MEDICINE

## 2022-07-22 PROCEDURE — 74420 UROGRAPHY RTRGR +-KUB: CPT

## 2022-07-22 PROCEDURE — 81001 URINALYSIS AUTO W/SCOPE: CPT

## 2022-07-22 PROCEDURE — 76210000006 HC OR PH I REC 0.5 TO 1 HR: Performed by: UROLOGY

## 2022-07-22 PROCEDURE — 74011000250 HC RX REV CODE- 250: Performed by: FAMILY MEDICINE

## 2022-07-22 PROCEDURE — 74011250637 HC RX REV CODE- 250/637: Performed by: STUDENT IN AN ORGANIZED HEALTH CARE EDUCATION/TRAINING PROGRAM

## 2022-07-22 PROCEDURE — 2709999900 HC NON-CHARGEABLE SUPPLY: Performed by: UROLOGY

## 2022-07-22 PROCEDURE — 76010000138 HC OR TIME 0.5 TO 1 HR: Performed by: UROLOGY

## 2022-07-22 PROCEDURE — 74011000250 HC RX REV CODE- 250: Performed by: NURSE ANESTHETIST, CERTIFIED REGISTERED

## 2022-07-22 PROCEDURE — 96374 THER/PROPH/DIAG INJ IV PUSH: CPT

## 2022-07-22 PROCEDURE — 74011250636 HC RX REV CODE- 250/636: Performed by: UROLOGY

## 2022-07-22 PROCEDURE — 77030012961 HC IRR KT CYSTO/TUR ICUM -A: Performed by: UROLOGY

## 2022-07-22 PROCEDURE — 74011250636 HC RX REV CODE- 250/636: Performed by: NURSE ANESTHETIST, CERTIFIED REGISTERED

## 2022-07-22 PROCEDURE — 99284 EMERGENCY DEPT VISIT MOD MDM: CPT

## 2022-07-22 DEVICE — URETERAL STENT
Type: IMPLANTABLE DEVICE | Site: URETER | Status: FUNCTIONAL
Brand: POLARIS™ ULTRA

## 2022-07-22 RX ORDER — FENTANYL CITRATE 50 UG/ML
25 INJECTION, SOLUTION INTRAMUSCULAR; INTRAVENOUS
Status: DISCONTINUED | OUTPATIENT
Start: 2022-07-22 | End: 2022-07-22 | Stop reason: HOSPADM

## 2022-07-22 RX ORDER — DEXAMETHASONE SODIUM PHOSPHATE 4 MG/ML
INJECTION, SOLUTION INTRA-ARTICULAR; INTRALESIONAL; INTRAMUSCULAR; INTRAVENOUS; SOFT TISSUE AS NEEDED
Status: DISCONTINUED | OUTPATIENT
Start: 2022-07-22 | End: 2022-07-22 | Stop reason: HOSPADM

## 2022-07-22 RX ORDER — HYDROMORPHONE HYDROCHLORIDE 1 MG/ML
1 INJECTION, SOLUTION INTRAMUSCULAR; INTRAVENOUS; SUBCUTANEOUS ONCE
Status: COMPLETED | OUTPATIENT
Start: 2022-07-22 | End: 2022-07-22

## 2022-07-22 RX ORDER — SODIUM CHLORIDE 0.9 % (FLUSH) 0.9 %
5-40 SYRINGE (ML) INJECTION AS NEEDED
Status: DISCONTINUED | OUTPATIENT
Start: 2022-07-22 | End: 2022-07-22 | Stop reason: HOSPADM

## 2022-07-22 RX ORDER — DOXYCYCLINE HYCLATE 100 MG
100 TABLET ORAL EVERY 12 HOURS
Qty: 6 TABLET | Refills: 0 | Status: SHIPPED | OUTPATIENT
Start: 2022-07-22

## 2022-07-22 RX ORDER — KETOROLAC TROMETHAMINE 30 MG/ML
INJECTION, SOLUTION INTRAMUSCULAR; INTRAVENOUS AS NEEDED
Status: DISCONTINUED | OUTPATIENT
Start: 2022-07-22 | End: 2022-07-22 | Stop reason: HOSPADM

## 2022-07-22 RX ORDER — HYDROMORPHONE HYDROCHLORIDE 2 MG/1
2 TABLET ORAL
Qty: 16 TABLET | Refills: 0 | Status: SHIPPED | OUTPATIENT
Start: 2022-07-22 | End: 2022-07-26

## 2022-07-22 RX ORDER — HYDROMORPHONE HYDROCHLORIDE 1 MG/ML
.2-.5 INJECTION, SOLUTION INTRAMUSCULAR; INTRAVENOUS; SUBCUTANEOUS
Status: DISCONTINUED | OUTPATIENT
Start: 2022-07-22 | End: 2022-07-22 | Stop reason: HOSPADM

## 2022-07-22 RX ORDER — SODIUM CHLORIDE 0.9 % (FLUSH) 0.9 %
5-40 SYRINGE (ML) INJECTION EVERY 8 HOURS
Status: DISCONTINUED | OUTPATIENT
Start: 2022-07-22 | End: 2022-07-22 | Stop reason: HOSPADM

## 2022-07-22 RX ORDER — ONDANSETRON 2 MG/ML
4 INJECTION INTRAMUSCULAR; INTRAVENOUS
Status: COMPLETED | OUTPATIENT
Start: 2022-07-22 | End: 2022-07-22

## 2022-07-22 RX ORDER — KETOROLAC TROMETHAMINE 30 MG/ML
30 INJECTION, SOLUTION INTRAMUSCULAR; INTRAVENOUS
Status: DISCONTINUED | OUTPATIENT
Start: 2022-07-22 | End: 2022-07-22

## 2022-07-22 RX ORDER — HYDROMORPHONE HYDROCHLORIDE 1 MG/ML
0.2 INJECTION, SOLUTION INTRAMUSCULAR; INTRAVENOUS; SUBCUTANEOUS
Status: COMPLETED | OUTPATIENT
Start: 2022-07-22 | End: 2022-07-22

## 2022-07-22 RX ORDER — ONDANSETRON 2 MG/ML
INJECTION INTRAMUSCULAR; INTRAVENOUS AS NEEDED
Status: DISCONTINUED | OUTPATIENT
Start: 2022-07-22 | End: 2022-07-22 | Stop reason: HOSPADM

## 2022-07-22 RX ORDER — MIDAZOLAM HYDROCHLORIDE 1 MG/ML
INJECTION, SOLUTION INTRAMUSCULAR; INTRAVENOUS AS NEEDED
Status: DISCONTINUED | OUTPATIENT
Start: 2022-07-22 | End: 2022-07-22 | Stop reason: HOSPADM

## 2022-07-22 RX ORDER — ATROPA BELLADONNA AND OPIUM 16.2; 3 MG/1; MG/1
SUPPOSITORY RECTAL AS NEEDED
Status: DISCONTINUED | OUTPATIENT
Start: 2022-07-22 | End: 2022-07-22 | Stop reason: HOSPADM

## 2022-07-22 RX ORDER — HYDROMORPHONE HYDROCHLORIDE 1 MG/ML
1 INJECTION, SOLUTION INTRAMUSCULAR; INTRAVENOUS; SUBCUTANEOUS
Status: COMPLETED | OUTPATIENT
Start: 2022-07-22 | End: 2022-07-22

## 2022-07-22 RX ORDER — CEFTRIAXONE 1 G/1
1 INJECTION, POWDER, FOR SOLUTION INTRAMUSCULAR; INTRAVENOUS
Status: COMPLETED | OUTPATIENT
Start: 2022-07-22 | End: 2022-07-22

## 2022-07-22 RX ORDER — KETOROLAC TROMETHAMINE 30 MG/ML
15 INJECTION, SOLUTION INTRAMUSCULAR; INTRAVENOUS
Status: COMPLETED | OUTPATIENT
Start: 2022-07-22 | End: 2022-07-22

## 2022-07-22 RX ORDER — PROPOFOL 10 MG/ML
INJECTION, EMULSION INTRAVENOUS AS NEEDED
Status: DISCONTINUED | OUTPATIENT
Start: 2022-07-22 | End: 2022-07-22 | Stop reason: HOSPADM

## 2022-07-22 RX ORDER — SODIUM CHLORIDE, SODIUM LACTATE, POTASSIUM CHLORIDE, CALCIUM CHLORIDE 600; 310; 30; 20 MG/100ML; MG/100ML; MG/100ML; MG/100ML
25 INJECTION, SOLUTION INTRAVENOUS CONTINUOUS
Status: DISCONTINUED | OUTPATIENT
Start: 2022-07-22 | End: 2022-07-22 | Stop reason: HOSPADM

## 2022-07-22 RX ORDER — ONDANSETRON 2 MG/ML
4 INJECTION INTRAMUSCULAR; INTRAVENOUS AS NEEDED
Status: DISCONTINUED | OUTPATIENT
Start: 2022-07-22 | End: 2022-07-22 | Stop reason: HOSPADM

## 2022-07-22 RX ORDER — MORPHINE SULFATE 2 MG/ML
2 INJECTION, SOLUTION INTRAMUSCULAR; INTRAVENOUS
Status: DISCONTINUED | OUTPATIENT
Start: 2022-07-22 | End: 2022-07-22 | Stop reason: HOSPADM

## 2022-07-22 RX ORDER — SODIUM CHLORIDE 0.9 % (FLUSH) 0.9 %
5-10 SYRINGE (ML) INJECTION ONCE
Status: COMPLETED | OUTPATIENT
Start: 2022-07-22 | End: 2022-07-22

## 2022-07-22 RX ORDER — FENTANYL CITRATE 50 UG/ML
INJECTION, SOLUTION INTRAMUSCULAR; INTRAVENOUS AS NEEDED
Status: DISCONTINUED | OUTPATIENT
Start: 2022-07-22 | End: 2022-07-22 | Stop reason: HOSPADM

## 2022-07-22 RX ORDER — LIDOCAINE HYDROCHLORIDE 10 MG/ML
0.1 INJECTION, SOLUTION EPIDURAL; INFILTRATION; INTRACAUDAL; PERINEURAL AS NEEDED
Status: DISCONTINUED | OUTPATIENT
Start: 2022-07-22 | End: 2022-07-22 | Stop reason: HOSPADM

## 2022-07-22 RX ORDER — DIPHENHYDRAMINE HYDROCHLORIDE 50 MG/ML
12.5 INJECTION, SOLUTION INTRAMUSCULAR; INTRAVENOUS AS NEEDED
Status: DISCONTINUED | OUTPATIENT
Start: 2022-07-22 | End: 2022-07-22 | Stop reason: HOSPADM

## 2022-07-22 RX ORDER — LIDOCAINE HYDROCHLORIDE 20 MG/ML
INJECTION, SOLUTION EPIDURAL; INFILTRATION; INTRACAUDAL; PERINEURAL AS NEEDED
Status: DISCONTINUED | OUTPATIENT
Start: 2022-07-22 | End: 2022-07-22 | Stop reason: HOSPADM

## 2022-07-22 RX ADMIN — DEXAMETHASONE SODIUM PHOSPHATE 4 MG: 4 INJECTION, SOLUTION INTRAMUSCULAR; INTRAVENOUS at 14:35

## 2022-07-22 RX ADMIN — ONDANSETRON 4 MG: 2 INJECTION INTRAMUSCULAR; INTRAVENOUS at 01:19

## 2022-07-22 RX ADMIN — PROPOFOL 40 MG: 10 INJECTION, EMULSION INTRAVENOUS at 14:28

## 2022-07-22 RX ADMIN — KETOROLAC TROMETHAMINE 30 MG: 30 INJECTION, SOLUTION INTRAMUSCULAR; INTRAVENOUS at 14:56

## 2022-07-22 RX ADMIN — FENTANYL CITRATE 50 MCG: 50 INJECTION, SOLUTION INTRAMUSCULAR; INTRAVENOUS at 14:26

## 2022-07-22 RX ADMIN — CEFTRIAXONE SODIUM 1 G: 1 INJECTION, POWDER, FOR SOLUTION INTRAMUSCULAR; INTRAVENOUS at 14:38

## 2022-07-22 RX ADMIN — SODIUM CHLORIDE, POTASSIUM CHLORIDE, SODIUM LACTATE AND CALCIUM CHLORIDE 25 ML/HR: 600; 310; 30; 20 INJECTION, SOLUTION INTRAVENOUS at 13:35

## 2022-07-22 RX ADMIN — FENTANYL CITRATE 50 MCG: 50 INJECTION, SOLUTION INTRAMUSCULAR; INTRAVENOUS at 14:40

## 2022-07-22 RX ADMIN — Medication 3 AMPULE: at 13:35

## 2022-07-22 RX ADMIN — PROPOFOL 160 MG: 10 INJECTION, EMULSION INTRAVENOUS at 14:26

## 2022-07-22 RX ADMIN — HYDROMORPHONE HYDROCHLORIDE 0.2 MG: 1 INJECTION, SOLUTION INTRAMUSCULAR; INTRAVENOUS; SUBCUTANEOUS at 10:27

## 2022-07-22 RX ADMIN — HYDROMORPHONE HYDROCHLORIDE 1 MG: 1 INJECTION, SOLUTION INTRAMUSCULAR; INTRAVENOUS; SUBCUTANEOUS at 01:19

## 2022-07-22 RX ADMIN — SODIUM CHLORIDE, PRESERVATIVE FREE 10 ML: 5 INJECTION INTRAVENOUS at 07:30

## 2022-07-22 RX ADMIN — ONDANSETRON HYDROCHLORIDE 4 MG: 2 INJECTION, SOLUTION INTRAMUSCULAR; INTRAVENOUS at 14:48

## 2022-07-22 RX ADMIN — HYDROMORPHONE HYDROCHLORIDE 1 MG: 1 INJECTION, SOLUTION INTRAMUSCULAR; INTRAVENOUS; SUBCUTANEOUS at 03:48

## 2022-07-22 RX ADMIN — CEFTRIAXONE SODIUM 1 G: 1 INJECTION, POWDER, FOR SOLUTION INTRAMUSCULAR; INTRAVENOUS at 04:11

## 2022-07-22 RX ADMIN — KETOROLAC TROMETHAMINE 15 MG: 30 INJECTION, SOLUTION INTRAMUSCULAR; INTRAVENOUS at 01:19

## 2022-07-22 RX ADMIN — SODIUM CHLORIDE 500 ML: 9 INJECTION, SOLUTION INTRAVENOUS at 10:01

## 2022-07-22 RX ADMIN — LIDOCAINE HYDROCHLORIDE 80 MG: 20 INJECTION, SOLUTION EPIDURAL; INFILTRATION; INTRACAUDAL; PERINEURAL at 14:26

## 2022-07-22 RX ADMIN — HYDROMORPHONE HYDROCHLORIDE 1 MG: 1 INJECTION, SOLUTION INTRAMUSCULAR; INTRAVENOUS; SUBCUTANEOUS at 07:28

## 2022-07-22 RX ADMIN — MIDAZOLAM HYDROCHLORIDE 2 MG: 1 INJECTION, SOLUTION INTRAMUSCULAR; INTRAVENOUS at 14:19

## 2022-07-22 NOTE — PROGRESS NOTES
Contacted [facility] to arrange ALS transport of patient to ED [facility] ED. Spoke with Huyen. Discussed patient condition, and potential need for pain medication en route. ETA is 3070. ED is aware.

## 2022-07-22 NOTE — ANESTHESIA POSTPROCEDURE EVALUATION
Procedure(s):  CYSTOSCOPY, RIGHT URETERAL STENT INSERTION. general    Anesthesia Post Evaluation      Multimodal analgesia: multimodal analgesia used between 6 hours prior to anesthesia start to PACU discharge  Patient location during evaluation: bedside  Patient participation: complete - patient participated  Level of consciousness: awake  Pain management: adequate  Airway patency: patent  Anesthetic complications: no  Cardiovascular status: acceptable  Respiratory status: acceptable  Hydration status: acceptable  Post anesthesia nausea and vomiting:  controlled  Final Post Anesthesia Temperature Assessment:  Normothermia (36.0-37.5 degrees C)      INITIAL Post-op Vital signs:   Vitals Value Taken Time   /70 07/22/22 1545   Temp 36.6 °C (97.8 °F) 07/22/22 1519   Pulse 96 07/22/22 1556   Resp 17 07/22/22 1556   SpO2 98 % 07/22/22 1556   Vitals shown include unvalidated device data.

## 2022-07-22 NOTE — ED NOTES
Patient had questions about the use of ordered antibiotics,  was made aware. Will continue to monitor.

## 2022-07-22 NOTE — DISCHARGE SUMMARY
Discharge Summary     Patient: Kamille Mayes MRN: [id number]  SSN: [id number]    YOB: 1990  Age: 33 y.o. Sex: female      Admit Date: 7/22/2022    Discharge Date: 7/22/2022     * Admission Diagnoses:  No admission diagnoses are documented for this encounter. * Discharge Diagnoses:   Hospital Problems as of 7/22/2022 Date Reviewed: 5/16/2022   None       * Procedures for this admission:   Procedure(s):  CYSTOSCOPY, RIGHT URETERAL STENT INSERTION      * Disposition: Home    * Discharged Condition: good and improved    * Hospital Course:  Stent placed    Discharge Medications:   Current Discharge Medication List        START taking these medications    Details   HYDROmorphone (DILAUDID) 2 mg tablet Take 1 Tablet by mouth every six (6) hours as needed for Pain for up to 4 days. Max Daily Amount: 8 mg. Qty: 16 Tablet, Refills: 0  Start date: 7/22/2022, End date: 7/26/2022    Associated Diagnoses: Kidney stone           CONTINUE these medications which have CHANGED    Details   doxycycline (VIBRA-TABS) 100 mg tablet Take 1 Tablet by mouth every twelve (12) hours. Qty: 6 Tablet, Refills: 0  Start date: 7/22/2022           CONTINUE these medications which have NOT CHANGED    Details   ondansetron (ZOFRAN ODT) 4 mg disintegrating tablet Take 1 Tablet by mouth every eight (8) hours as needed for Nausea. Qty: 6 Tablet, Refills: 0      ibuprofen (MOTRIN) 600 mg tablet Take 1 Tablet by mouth every six (6) hours as needed for Pain. Qty: 20 Tablet, Refills: 0              * Follow-up Care/Patient Instructions:   Activity: Activity as tolerated  Diet: Resume previous diet  Wound Care: None needed    Follow-up Information       Follow up With Specialties Details Why [address] [facility], [address], NP Nurse Practitioner   [place] [address]  [address] [facility] [address] [phone]               Signed By: Harjeet Fonseca MD     July 22, 2022

## 2022-07-22 NOTE — PERIOP NOTES
TRANSFER - OUT REPORT:    Verbal report given to [facility] (name) on Travis Becerril  being transferred to phase 2 (unit) for routine post - op       Report consisted of patients Situation, Background, Assessment and   Recommendations(SBAR). Information from the following report(s) Intake/Output, MAR, and Cardiac Rhythm NSR  was reviewed with the receiving nurse. Opportunity for questions and clarification was provided.       Patient transported with:   Registered Nurse

## 2022-07-22 NOTE — ED NOTES
QUINN on scene at [facility] to transport patient to ED [facility] ED.

## 2022-07-22 NOTE — PERIOP NOTES
1249 - TRANSFER - IN REPORT:    Verbal report received from [facility] RN(name) on Willow Hardy  being received from ED 15(unit) for ordered procedure      Report consisted of patients Situation, Background, Assessment and   Recommendations(SBAR). Information from the following report(s) ED Summary, Pre Procedure Checklist, and Procedure Verification was reviewed with the receiving nurse. Opportunity for questions and clarification was provided. Assessment completed upon patients arrival to unit and care assumed. 1307 - PT ARRIVED INTO PRE-OP HOLDING 16 - PT A&OX4, ARZATE SPON AND TO COMMAND. RESP EVEN AND UNLABORED. POX ON RA > 94%. ABD TENDER - PT C/O RIGHT FLANK PAIN 8/10 - AMB TO BR WITHOUT ASSIST. REPOSITIONED FOR COMFORT. PT REQUESTING TO PUMP AS SHE IS BREASTFEEDING. PT HAD OWN MACHINE. PUMPED X 5 MIN. RELIEF OBTAINED. PRE-OP TCHING DONE - PT VERBALIZES UNDERSTANDING. STRETCHER IN LOWEST POSITION, CB IN PLACE AND SR UP X2.  DR. BOYD IN TO SEE PT.  PT ASKED ABOUT BREAST FEEDING - DR. BOYD ASSISTED PT  - PT VERBALIZES UNDERSTANDING.

## 2022-07-22 NOTE — ANESTHESIA PREPROCEDURE EVALUATION
Relevant Problems   No relevant active problems       Anesthetic History   No history of anesthetic complications            Review of Systems / Medical History  Patient summary reviewed, nursing notes reviewed and pertinent labs reviewed    Pulmonary  Within defined limits                 Neuro/Psych   Within defined limits           Cardiovascular                  Exercise tolerance: <4 METS     GI/Hepatic/Renal         Renal disease: stones       Endo/Other        Morbid obesity     Other Findings   Comments: OBSTRUCTING LEFT KIDNEY STONE           Physical Exam    Airway  Mallampati: II  TM Distance: 4 - 6 cm  Neck ROM: normal range of motion   Mouth opening: Normal     Cardiovascular  Regular rate and rhythm,  S1 and S2 normal,  no murmur, click, rub, or gallop             Dental      Comments: 1 broken tooth   Pulmonary                 Abdominal  GI exam deferred       Other Findings            Anesthetic Plan    ASA: 3  Anesthesia type: general    Monitoring Plan: BIS      Induction: Intravenous  Anesthetic plan and risks discussed with: Patient

## 2022-07-22 NOTE — ED NOTES
TRANSFER - OUT REPORT:    Verbal report given to [facility] RN(name) on Toñito Holm  being transferred to [facility] ED(unit) for urgent transfer       Report consisted of patients Situation, Background, Assessment and   Recommendations(SBAR). Information from the following report(s) SBAR, Kardex, ED Summary, and MAR was reviewed with the receiving nurse. Lines:   Peripheral IV 07/22/22 Left Antecubital (Active)        Opportunity for questions and clarification was provided.       Patient transported with:   Monitor

## 2022-07-22 NOTE — CONSULTS
Urology Consult    Patient: Houston Hernandez MRN: [id number]  SSN: [id number]    YOB: 1990  Age: 33 y.o. Sex: female          Date of Consultation:  July 22, 2022  Requesting Physician: rTipp Douglas MD  Reason for Consultation: right hydronephrosis            Assessment/Plan:  S/p URS with laser 7/18/22 for RLP 8mm right renal stone and ureteral stent was pulled 7/21/22  Now with right flank pain and Right hydronephrosis with multiple obstructing ureteral stone fragments, 7,5 and 3mm in size. Other nonobstructing right renal stones   Leukocytosis. Not grossly infected, afebrile, UA relatively clean  Mild OLI    Discussed with pt CT findings , and need for surgical intervention to relieve obstruction , allow urine to drain and definitve stone treatment at a later time    -Dr. Kapoor Current requesting urgent cystoscopy possible right ureteroscopy with laser, ureteral stent placement today. Keep NPO. Nursing to obtain consent. Risks discussed which include but are not limited to infection, bleeding, ureteral injury, inability to place stents, possible need for PCN's. She voices understanding and wishes to proceed. Expected irritative voiding symptoms post procedure also described. She knows stents are temporary and that she will need to follow up for stent removal and stone treatment  -send urine for culture  -hopeful to d.c today after stent placement due to patient wedding tomorrow     Discussed with supervising MD, Dr. Kapoor Current        History of Present Illness:  Patient is a 33 y.o. female admitted 7/22/2022 to the hospital for No admission diagnoses are documented for this encounter. .  She  presents to the ED with cc of transfer for intractable pain. Patient states she had a stent placed on the right side and laser/ureteroscopy surgery for right-sided stone. She says that was done 4 days ago.   Her stent was removed yesterday in urology clinic , she started to have increasing pain.  She took oxybutynin for spasms, but states that did not relieve her symptoms. Her pain intensified last night, so she went to [facility] [address] count was 18, her CT revealed a 7 mm right UVJ stone with upstream hydroureter and hydronephrosis, 3 and 5 mm stones in proximal right ureter, and multiple punctate collecting system stones of the right kidney. Her urine did not suggest UTI, but she received Rocephin, Toradol, Dilaudid and fluids. She continues to have a 6 out of 10 pain involving the right flank, that radiates to the right abdomen. She denies fever or chills. She has nausea, but denies lightheadedness. Denies chest pain or shortness of breath. Chart reviewed. Currently denies chills or fevers. Voiding independently. CT a/p images reviewed personally along with Dr. Tierra Mathis  She is tearful on exam. She is getting  tomorrow. She also has a hx of left ureteral stent placement in may with stone treatment. CT Results  (Last 48 hours)                 07/22/22 0308  CT ABD PELV WO CONT Final result    Impression:  Obstructing linear 7 mm right ureterovesical junction stone with   upstream hydroureter ureter and hydronephrosis, 3 and 5 mm stones in the   proximal right ureter, and multiple punctate collecting system stones of the   right kidney. Narrative:  EXAM: CT ABD PELV WO CONT       INDICATION: flank pain ? stone       COMPARISON: CT 5/9/2022. IV CONTRAST: None. ORAL CONTRAST: None. TECHNIQUE:    Thin axial images were obtained through the abdomen and pelvis. Coronal and   sagittal reformats were generated. CT dose reduction was achieved through use of   a standardized protocol tailored for this examination and automatic exposure   control for dose modulation. The absence of intravenous contrast material reduces the sensitivity for   evaluation of the vasculature and solid organs.        FINDINGS:    LOWER THORAX: No significant abnormality in the incidentally imaged lower chest.   LIVER: Markedly hypodense with sparing about the gallbladder fossa. No focal   lesion otherwise. BILIARY TREE: Gallbladder is within normal limits. CBD is not dilated. SPLEEN: Unremarkable. PANCREAS: No focal abnormality. ADRENALS: Unremarkable. KIDNEYS/URETERS: Right hydronephrosis and hydroureter to linear 7 mm right   ureterovesical junction stone with  and there are multiple punctate collecting   system stones of the right kidney as well as 3 and 5 mm stones layering   dependently within the dilated proximal right ureter. No left hydronephrosis or   hydroureter or left renal or ureteral stones. STOMACH: Unremarkable. SMALL BOWEL: No dilatation or wall thickening. COLON: No dilatation or wall thickening. APPENDIX: No markable. PERITONEUM: No ascites or pneumoperitoneum. RETROPERITONEUM: No lymphadenopathy or aortic aneurysm. REPRODUCTIVE ORGANS: Uterus and ovaries are unremarkable. URINARY BLADDER: No mass or calculus. BONES: No destructive bone lesion. ABDOMINAL WALL: No mass or hernia. ADDITIONAL COMMENTS: N/A                     Past Medical History:  No Known Allergies   Prior to Admission medications    Medication Sig Start Date End Date Taking? Authorizing Provider   doxycycline (VIBRA-TABS) 100 mg tablet Take 1 Tablet by mouth every twelve (12) hours. 5/11/22   Jj Sorensen MD   ibuprofen (MOTRIN) 600 mg tablet Take 1 Tablet by mouth every six (6) hours as needed for Pain. 5/4/22   Harris Orozco MD   ondansetron (ZOFRAN ODT) 4 mg disintegrating tablet Take 1 Tablet by mouth every eight (8) hours as needed for Nausea. 5/4/22   Harris Orozco MD      PMHx:  has a past medical history of Nephrolithiasis. PSurgHx:  has no past surgical history on file. PSocHx:  reports that she has quit smoking. She has never used smokeless tobacco.   ROS:  Admission ROS by No admitting provider for patient encounter.  from 7/22/2022 were reviewed with the patient and changes (other than per HPI) include: none.     Physical Exam    General Appearance: NAD, awake  HENT: atraumatic, normal ears  Cardiovascular: not tachycardic, no LE edema  Respiratory: no distress, room air  Abdomen: soft, no suprapubic fullness or tenderness  : right cva tenderness  Extremities: moves all  Musculoskeletal: normal alignment of neck and head  Neuro: Appropriate, no focal neurological deficits  Mood/Affect: appropriate, A&O x 3      Lab Results   Component Value Date/Time    WBC 18.0 (H) 07/22/2022 01:25 AM    HCT 41.1 07/22/2022 01:25 AM    PLATELET 575 61/68/2298 01:25 AM    Sodium 140 07/22/2022 01:25 AM    Potassium 4.1 07/22/2022 01:25 AM    Chloride 102 07/22/2022 01:25 AM    CO2 25 07/22/2022 01:25 AM    BUN 14 07/22/2022 01:25 AM    Creatinine 1.18 (H) 07/22/2022 01:25 AM    Glucose 140 (H) 07/22/2022 01:25 AM    Calcium 9.7 07/22/2022 01:25 AM       UA:   Lab Results   Component Value Date/Time    Color YELLOW/STRAW 07/22/2022 02:20 AM    Appearance CLEAR 07/22/2022 02:20 AM    Specific gravity 1.027 07/22/2022 02:20 AM    pH (UA) 6.0 07/22/2022 02:20 AM    Protein 100 (A) 07/22/2022 02:20 AM    Glucose Negative 07/22/2022 02:20 AM    Ketone Negative 07/22/2022 02:20 AM    Bilirubin Negative 07/22/2022 02:20 AM    Urobilinogen 0.2 07/22/2022 02:20 AM    Nitrites Negative 07/22/2022 02:20 AM    Leukocyte Esterase Negative 07/22/2022 02:20 AM    Epithelial cells MODERATE (A) 07/22/2022 02:20 AM    Bacteria Negative 07/22/2022 02:20 AM    WBC 5-10 07/22/2022 02:20 AM    RBC 0-5 07/22/2022 02:20 AM         Signed By: Kyree Hodge NP  - July 22, 2022

## 2022-07-22 NOTE — ED TRIAGE NOTES
Patient states that she had kidney stint removed today and was not given pain medication and needs something for her pain.  Pain 10/10

## 2022-07-22 NOTE — ED NOTES
Hx of kidney stones with stent removal on left side in May; stent for right side placed on Monday and removed yesterday at [address] [facility] office. Pain started immediately after removal, spasm like, initially intermittent but more constant by midnight. Pt to [facility] ED. Treated with dilauded, zofran, toradol, and rocephin. Trf for hydronephrosis with 7mm stone. 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 Tearful at times.

## 2022-07-22 NOTE — BRIEF OP NOTE
Brief Postoperative Note    Patient: Stuart Lopez  YOB: 1990  MRN: [id number]    Date of Procedure: 7/22/2022     Pre-Op Diagnosis: right stent placement    Post-Op Diagnosis: Same as preoperative diagnosis. Procedure(s):  CYSTOSCOPY, RIGHT URETERAL STENT INSERTION    Surgeon(s):  Sumeet Osuna MD    Surgical Assistant: None    Anesthesia: Other     Estimated Blood Loss (mL): Minimal    Complications: None    Specimens: * No specimens in log *     Implants:   Implant Name Type Inv. Item Serial No.  Lot No. LRB No. Used Action   STENT URET DBL-PGTL [id number] -- PERCUFLEX - SNA  STENT URET DBL-PGTL [id number] -- PERCUFLEX NA [employer]_ [id number] Right 1 Implanted       Drains: * No LDAs found *    Findings:   Stone fragments within the urinary bladder. Right ureteral stent placed within the right collecting system.     Electronically Signed by Aayush Garcia MD on 7/22/2022 at 3:13 PM

## 2022-07-22 NOTE — PERIOP NOTES
Handoff Report from Operating Room to PACU    Report received from Yuli Mercado  and Meg Busbyhusvej An CRNA regarding Katerin Arriaga. Surgeon(s):  Lelo Davies MD  And Procedure(s) (LRB):  CYSTOSCOPY, RIGHT URETERAL STENT INSERTION (Right)  confirmed   with drains discussed. Anesthesia type, drugs, patient history, complications, estimated blood loss, vital signs, intake and output, and last pain medication were reviewed.

## 2022-07-22 NOTE — DISCHARGE INSTRUCTIONS
Post-operative Instructions from Dr. Albaro Quinteros    Diet:   [x]       Start with clear liquids and advance to small meals as tolerated. Activity:   [x]       Unrestricted   []       Light activity for 48 hours   [x]       No driving while using pain medications    Special Instructions:   [x]       You may have some blood in the urine, frequent urination, and burning with urination for several days. [x]       You have a ureteral stent in place and may experience occasional discomfort in the kidney area on that side, especially when urinating.   []       You have a copeland catheter in place. See below for instructions on care of the catheter. Medications:   [x]       Resume previous medications upon discharge except blood thinning medications (e.g. aspirin, coumadin, plavix). Blood thinning medications may be resumed in 7 days if your urine is clear unless otherwise instructed by the prescribing physician. [x]       You have been given a prescription for pain medication. This should be used sparingly as needed. Keep in mind that it may cause constipation. [x]       You have been given a prescription for medication to help with bladder discomfort. Use this as needed for burning with urination or feelings of urgency. Call for:   [x]       Fever > 101 degrees F   [x]       Uncontrolled pain, nausea or vomiting   [x]       Abdominal swelling   [x]       Catheter not draining   [x]       Large clots or persistent heavy blood in the urine   [x]       Unable to urinate      Follow-up as scheduled. If you do not have an appointment already, call [phone] and ask for Marnie Palomino to schedule follow-up with Dr. Shobha English.    DISCHARGE SUMMARY from Nurse    PATIENT INSTRUCTIONS:    After general anesthesia or intravenous sedation, for 24 hours or while taking prescription narcotics:    Have someone responsible help you with your care  Limit your activities  Do not drive and operate hazardous machinery  Do not make important personal, legal or business decisions  Do not drink alcoholic beverages  If you have not urinated within 8 hours after discharge, please contact your surgeon on call  Resume your medications unless otherwise instructed    From general anesthesia, intravenous sedation, or while taking prescription narcotics, you may experience:    Drowsiness, dizziness, sleepiness, or confusion  Difficulty remembering or delayed reaction times  Difficulty with your balance, especially while walking, move slowly and carefully, do not make sudden position changes  Difficulty focusing or blurred vision    You may not be aware of slight changes in your behavior and/or your reaction time because of the medication used during and after your procedure. Report the following to your surgeon:  Excessive pain, swelling, redness or odor of or around the surgical area  Temperature over 100.5  Nausea and vomiting lasting longer than 4 hours or if unable to take medications  Any signs of decreased circulation or nerve impairment to extremity: change in color, persistent numbness, tingling, coldness or increase pain  Any questions or concerns         IF YOU REPORT TO AN EMERGENCY ROOM, DOCTOR'S OFFICE OR HOSPITAL WITHIN 24 HOURS AFTER YOUR PROCEDURE, BRING THIS SHEET AND YOUR AFTER VISIT SUMMARY WITH YOU AND GIVE IT TO THE PHYSICIAN OR NURSE ATTENDING YOU. These are general instructions for a healthy lifestyle (if applicable): No smoking/ No tobacco products/ Avoid exposure to secondhand smoke  Surgeon General's Warning:  Quitting smoking now greatly reduces serious risk to your health.     Obesity, smoking, and sedentary lifestyle greatly increases your risk for illness    A healthy diet, regular physical exercise & weight monitoring are important for maintaining a healthy lifestyle    You may be retaining fluid if you have a history of heart failure or if you experience any of the following symptoms:  Weight gain of 3 pounds or more overnight or 5 pounds in a week, increased swelling in our hands or feet or shortness of breath while lying flat in bed. Please call your doctor as soon as you notice any of these symptoms; do not wait until your next office visit. A common side effect of anesthesia following surgery is nausea and/or vomiting. In order to decrease symptoms, it is wise to avoid foods that are high in fat, greasy foods, milk products, and spicy foods for the first 24 hours. Acceptable foods for the first 24 hours following surgery include but are not limited to:    soup  broth  toast   crackers   applesauce  bananas   mashed potatoes,  soft or scrambled eggs  oatmeal  jello    It is important to eat when taking your pain medication. This will help to prevent nausea. If possible, please try to time your meals with your medications. It is very important to stay hydrated following surgery. Sip fluids frequently while awake. Avoid acidic drinks such as citrus juices and soda for 24 hours. Carbonated beverages may cause bloating and gas. Acceptable fluids include:    water (flavor packets may add variety)  coffee or tea (in moderation)  Gatorade  Jcarlos-Aid  apple juice  cranberry juice    You are encouraged to cough and deep breathe every hour when awake. This will help to prevent respiratory complications following anesthesia. You may want to hug a pillow when coughing and sneezing to add additional support to the surgical area and to decrease discomfort if you had abdominal or chest surgery. If you are discharged home with support stockings, you may remove them after 24 hours. Support stockings are used to help prevent blood clots in the legs following surgery. TO PREVENT AN INFECTION      [address] YOUR HANDS    To prevent infection, good handwashing is the most important thing you or your caregiver can do.       Wash your hands with soap and water or use the hand  we gave you before you touch any wounds. SHOWER    Use the antibacterial soap we gave you when you take a shower. Shower with this soap until your wounds are healed. To reach all areas of your body, you may need someone to help you. Dont forget to clean your belly button with every shower. USE CLEAN SHEETS    Use freshly cleaned sheets on your bed after surgery. To keep the surgery site clean, do not allow pets to sleep with you while your wound is still healing. STOP SMOKING    Stop smoking, or at least cut back on smoking    Smoking slows your healing. CONTROL YOUR BLOOD SUGAR    High blood sugars slow wound healing. If you are diabetic, control your blood sugar levels before and after your surgery. Please take time to review all of your Home Care Instructions and Medication Information sheets provided in your discharge packet. If you have any questions, please contact your surgeon's office. Thank you. The discharge information has been reviewed with the patient and instruction recipient. The patient and instruction recipient verbalized understanding. Discharge medications reviewed with the patient and instruction recipient and appropriate educational materials and side effects teaching were provided. Please provide this summary of care documentation to your next provider. Hydromorphone (Dilaudid, Exalgo) - (By mouth)   Why this medicine is used:   Treats moderate to severe pain. This medicine is a narcotic pain reliever.   Contact a nurse or doctor right away if you have:  Extreme weakness, shallow breathing, fast or slow heartbeat  Severe confusion, lightheadedness, dizziness, fainting  Sweating or cold, clammy skin  Anxiety, restlessness, fever, sweating, muscle spasms, twitching, seeing or hearing things that are not there  Severe constipation, stomach pain, vomiting     Common side effects:  Mild constipation, nausea, diarrhea  Sleepiness, tiredness  Itching, rash  © 2017 [facility] Information is for End User's use only and may not be sold, redistributed or otherwise used for commercial purposes.

## 2022-07-22 NOTE — ED PROVIDER NOTES
EMERGENCY DEPARTMENT HISTORY AND PHYSICAL EXAM      Date: 7/22/2022  Patient Name: Trenton Crowley    History of Presenting Illness     Chief Complaint   Patient presents with    Flank Pain     Pain at right flank radiating to RLQ since stent removed yesterday. Last dose of dilaudid at 0728 prior to transfer. AB dose initiated at [facility]. Pain now 6/10. History Provided By: Patient and medical records    HPI: Trenton Crowley, 33 y.o. female presents to the ED with cc of transfer for intractable pain. Patient states she had a stent placed on the right side and laser surgery for right-sided stone. She says that was done 4 days ago. Her stent was removed yesterday, she started to have increasing pain. She took oxybutynin for spasms, but states that did not relieve her symptoms. Her pain intensified last night, so she went to [address] [place] [address] Drive count was 18, her CT revealed a 7 mm right UVJ stone with upstream hydroureter and hydronephrosis, 3 and 5 mm stones in proximal right ureter, and multiple punctate collecting system stones of the right kidney. Her urine did not suggest UTI, but she received Rocephin, Toradol, Dilaudid and fluids. She continues to have a 6 out of 10 pain involving the right flank, that radiates to the right abdomen. She denies fever or chills. She has nausea, but denies lightheadedness. Denies chest pain or shortness of breath. There are no other complaints, changes, or physical findings at this time.     PCP: Chris Montes NP    Current Facility-Administered Medications on File Prior to Encounter   Medication Dose Route Frequency Provider Last Rate Last Admin    [COMPLETED] sodium chloride (NS) flush 5-10 mL  5-10 mL IntraVENous Matt Kumar MD   10 mL at 07/22/22 0730    [COMPLETED] ondansetron (ZOFRAN) injection 4 mg  4 mg IntraVENous NOW Paulino Ray MD   4 mg at 07/22/22 0119    [COMPLETED] ketorolac (TORADOL) injection 15 mg  15 mg IntraVENous NOW Cristin Anne MD   15 mg at 07/22/22 0119    [COMPLETED] HYDROmorphone (DILAUDID) syringe 1 mg  1 mg IntraVENous NOW Cristin Anne MD   1 mg at 07/22/22 0119    [COMPLETED] HYDROmorphone (DILAUDID) syringe 1 mg  1 mg IntraVENous Maribel Cordova MD   1 mg at 07/22/22 0348    [COMPLETED] cefTRIAXone (ROCEPHIN) 1 g in 0.9% sodium chloride (MBP/ADV) 50 mL MBP  1 g IntraVENous NOW Cristin Anne MD   IV Completed at 07/22/22 5290    [COMPLETED] HYDROmorphone (DILAUDID) syringe 1 mg  1 mg IntraVENous Sarah Hull MD   1 mg at 07/22/22 8703     Current Outpatient Medications on File Prior to Encounter   Medication Sig Dispense Refill    doxycycline (VIBRA-TABS) 100 mg tablet Take 1 Tablet by mouth every twelve (12) hours. 24 Tablet 0    ibuprofen (MOTRIN) 600 mg tablet Take 1 Tablet by mouth every six (6) hours as needed for Pain. 20 Tablet 0    ondansetron (ZOFRAN ODT) 4 mg disintegrating tablet Take 1 Tablet by mouth every eight (8) hours as needed for Nausea. 6 Tablet 0       Past History     Past Medical History:  Past Medical History:   Diagnosis Date    Nephrolithiasis        Past Surgical History:  No past surgical history on file. Family History:  Family History   Problem Relation Age of Onset    Cancer Mother         Cervical Cancer    Heart Disease Father     Diabetes Father     Other Father         Back problems, DDD       Social History:  Social History     Tobacco Use    Smoking status: Former    Smokeless tobacco: Never       Allergies:  No Known Allergies      Review of Systems   Review of Systems   Constitutional:  Negative for fever. HENT:  Negative for congestion. Eyes: Negative. Respiratory:  Negative for shortness of breath. Cardiovascular:  Negative for chest pain. Gastrointestinal:  Positive for abdominal pain and nausea. Endocrine: Negative for heat intolerance. Genitourinary:  Positive for flank pain. Musculoskeletal:  Negative for back pain. Skin:  Negative for rash. Allergic/Immunologic: Negative for immunocompromised state. Neurological:  Negative for dizziness. Hematological:  Does not bruise/bleed easily. Psychiatric/Behavioral: Negative. All other systems reviewed and are negative. Physical Exam   Physical Exam  Vitals and nursing note reviewed. Constitutional:       General: She is not in acute distress. Appearance: She is well-developed. HENT:      Head: Normocephalic. Cardiovascular:      Rate and Rhythm: Normal rate and regular rhythm. Pulses: Normal pulses. Heart sounds: Normal heart sounds. Pulmonary:      Effort: Pulmonary effort is normal.      Breath sounds: Normal breath sounds. Abdominal:      General: Bowel sounds are normal.      Palpations: Abdomen is soft. Tenderness: There is abdominal tenderness. Comments: Right flank and right mid abdominal tenderness   Musculoskeletal:         General: Normal range of motion. Cervical back: Normal range of motion and neck supple. Skin:     General: Skin is warm and dry. Neurological:      General: No focal deficit present. Mental Status: She is alert and oriented to person, place, and time.    Psychiatric:         Mood and Affect: Mood normal.         Behavior: Behavior normal.       Diagnostic Study Results     Labs -     Recent Results (from the past 12 hour(s))   CBC WITH AUTOMATED DIFF    Collection Time: 07/22/22  1:25 AM   Result Value Ref Range    WBC 18.0 (H) 3.6 - 11.0 K/uL    RBC 4.86 3.80 - 5.20 M/uL    HGB 13.7 11.5 - 16.0 g/dL    HCT 41.1 35.0 - 47.0 %    MCV 84.6 80.0 - 99.0 FL    MCH 28.2 26.0 - 34.0 PG    MCHC 33.3 30.0 - 36.5 g/dL    RDW 13.9 11.5 - 14.5 %    PLATELET 794 509 - 645 K/uL    MPV 9.6 8.9 - 12.9 FL    NRBC 0.0 0  WBC    ABSOLUTE NRBC 0.00 0.00 - 0.01 K/uL    NEUTROPHILS 73 32 - 75 %    LYMPHOCYTES 18 12 - 49 %    MONOCYTES 7 5 - 13 %    EOSINOPHILS 1 0 - 7 %    BASOPHILS 0 0 - 1 %    IMMATURE GRANULOCYTES 1 (H) 0.0 - 0.5 %    ABS. NEUTROPHILS 13.2 (H) 1.8 - 8.0 K/UL    ABS. LYMPHOCYTES 3.2 0.8 - 3.5 K/UL    ABS. MONOCYTES 1.3 (H) 0.0 - 1.0 K/UL    ABS. EOSINOPHILS 0.2 0.0 - 0.4 K/UL    ABS. BASOPHILS 0.0 0.0 - 0.1 K/UL    ABS. IMM. GRANS. 0.1 (H) 0.00 - 0.04 K/UL    DF AUTOMATED     METABOLIC PANEL, COMPREHENSIVE    Collection Time: 07/22/22  1:25 AM   Result Value Ref Range    Sodium 140 136 - 145 mmol/L    Potassium 4.1 3.5 - 5.1 mmol/L    Chloride 102 97 - 108 mmol/L    CO2 25 21 - 32 mmol/L    Anion gap 13 5 - 15 mmol/L    Glucose 140 (H) 65 - 100 mg/dL    BUN 14 6 - 20 MG/DL    Creatinine 1.18 (H) 0.55 - 1.02 MG/DL    BUN/Creatinine ratio 12 12 - 20      GFR est AA >60 >60 ml/min/1.73m2    GFR est non-AA 53 (L) >60 ml/min/1.73m2    Calcium 9.7 8.5 - 10.1 MG/DL    Bilirubin, total 0.3 0.2 - 1.0 MG/DL    ALT (SGPT) 51 12 - 78 U/L    AST (SGOT) 25 15 - 37 U/L    Alk.  phosphatase 126 (H) 45 - 117 U/L    Protein, total 8.0 6.4 - 8.2 g/dL    Albumin 3.8 3.5 - 5.0 g/dL    Globulin 4.2 (H) 2.0 - 4.0 g/dL    A-G Ratio 0.9 (L) 1.1 - 2.2     URINALYSIS W/ RFLX MICROSCOPIC    Collection Time: 07/22/22  2:20 AM   Result Value Ref Range    Color YELLOW/STRAW      Appearance CLEAR CLEAR      Specific gravity 1.027 1.003 - 1.030      pH (UA) 6.0 5.0 - 8.0      Protein 100 (A) NEG mg/dL    Glucose Negative NEG mg/dL    Ketone Negative NEG mg/dL    Bilirubin Negative NEG      Blood LARGE (A) NEG      Urobilinogen 0.2 0.2 - 1.0 EU/dL    Nitrites Negative NEG      Leukocyte Esterase Negative NEG     HCG URINE, QL    Collection Time: 07/22/22  2:20 AM   Result Value Ref Range    HCG urine, QL Negative NEG     URINE MICROSCOPIC ONLY    Collection Time: 07/22/22  2:20 AM   Result Value Ref Range    WBC 5-10 0 - 4 /hpf    RBC 0-5 0 - 5 /hpf    Epithelial cells MODERATE (A) FEW /lpf    Bacteria Negative NEG /hpf       Radiologic Studies -   No orders to display     CT Results  (Last 48 hours)                 07/22/22 6367 CT ABD PELV WO CONT Final result    Impression:  Obstructing linear 7 mm right ureterovesical junction stone with   upstream hydroureter ureter and hydronephrosis, 3 and 5 mm stones in the   proximal right ureter, and multiple punctate collecting system stones of the   right kidney. Narrative:  EXAM: CT ABD PELV WO CONT       INDICATION: flank pain ? stone       COMPARISON: CT 5/9/2022. IV CONTRAST: None. ORAL CONTRAST: None. TECHNIQUE:    Thin axial images were obtained through the abdomen and pelvis. Coronal and   sagittal reformats were generated. CT dose reduction was achieved through use of   a standardized protocol tailored for this examination and automatic exposure   control for dose modulation. The absence of intravenous contrast material reduces the sensitivity for   evaluation of the vasculature and solid organs. FINDINGS:    LOWER THORAX: No significant abnormality in the incidentally imaged lower chest.   LIVER: Markedly hypodense with sparing about the gallbladder fossa. No focal   lesion otherwise. BILIARY TREE: Gallbladder is within normal limits. CBD is not dilated. SPLEEN: Unremarkable. PANCREAS: No focal abnormality. ADRENALS: Unremarkable. KIDNEYS/URETERS: Right hydronephrosis and hydroureter to linear 7 mm right   ureterovesical junction stone with  and there are multiple punctate collecting   system stones of the right kidney as well as 3 and 5 mm stones layering   dependently within the dilated proximal right ureter. No left hydronephrosis or   hydroureter or left renal or ureteral stones. STOMACH: Unremarkable. SMALL BOWEL: No dilatation or wall thickening. COLON: No dilatation or wall thickening. APPENDIX: No markable. PERITONEUM: No ascites or pneumoperitoneum. RETROPERITONEUM: No lymphadenopathy or aortic aneurysm. REPRODUCTIVE ORGANS: Uterus and ovaries are unremarkable. URINARY BLADDER: No mass or calculus.    BONES: No destructive bone lesion. ABDOMINAL WALL: No mass or hernia. ADDITIONAL COMMENTS: N/A                 CXR Results  (Last 48 hours)      None            Medical Decision Making   I am the first provider for this patient. I reviewed the vital signs, available nursing notes, past medical history, past surgical history, family history and social history. Vital Signs-Reviewed the patient's vital signs. Patient Vitals for the past 12 hrs:   Temp Pulse Resp BP SpO2   07/22/22 0930 98.2 °F (36.8 °C) 73 18 108/76 97 %       Records Reviewed: Nursing Notes, Old Medical Records, Previous Radiology Studies, and Previous Laboratory Studies    Provider Notes (Medical Decision Making):   Ureteral stone, dehydration, intractable pain    ED Course:   Initial assessment performed. The patients presenting problems have been discussed, and they are in agreement with the care plan formulated and outlined with them. I have encouraged them to ask questions as they arise throughout their visit. Consult note:     Patient was evaluated by nurse practitioner Eva Marmolejo, [place] [facility]. Dr. Bronwyn Rouse is taking the patient to the [address] [place], [place] [address] Time:   0      Disposition:  OR    DISCHARGE PLAN:  1. Current Discharge Medication List        2. Follow-up Information    None       3. Return to ED if worse     Diagnosis     Clinical Impression:   1. Kidney stone        Attestations:    Quin Morocho MD        Please note that this dictation was completed with be[employer], the computer voice recognition software. Quite often unanticipated grammatical, syntax, homophones, and other interpretive errors are inadvertently transcribed by the computer software. Please disregard these errors. Please excuse any errors that have escaped final proofreading. Thank you.

## 2022-08-23 NOTE — OP NOTES
[address]  OPERATIVE REPORT    Name:  Charisse Evans  MR#:  [id number]  :  1990  ACCOUNT #:  [de-identified]  DATE OF SERVICE:  2022    PREOPERATIVE DIAGNOSIS:  Right ureteral stone and hydronephrosis. POSTOPERATIVE DIAGNOSIS:  Right ureteral stone and hydronephrosis. PROCEDURE PERFORMED:  Right ureteral stent placed without complication. SURGEON:  Vahe Akbar MD    ASSISTANT:  None. ANESTHESIA:  General.    COMPLICATIONS:  None. SPECIMENS REMOVED:  None. IMPLANTS:  None. ESTIMATED BLOOD LOSS:  Minimal.    FINDINGS:  Stone fragments within urinary bladder. Right ureteral stent placed within the right collecting system. DESCRIPTION OF THE PROCEDURE IN DETAIL:  After informed consent was obtained, the patient was brought to the operating room and placed in supine position where general anesthesia was undertaken. She was then prepped and draped in dorsal lithotomy position. A proper time-out was performed including confirmation of preoperative antibiotics. To begin the procedure, a standard cystourethroscopy was performed. There were no abnormalities located within the urethra, within the urinary bladder. There were several tiny stones that were located within the urinary bladder. They were irrigated out. Attention was then turned to the right ureteral orifice which was cannulated with a Sensor wire. The wire was advanced up into the collecting system. At this time, a 5 x 26 double-J ureteral stent was placed under fluoroscopic guidance with a good curl noted in the kidney as well as in the urinary bladder. The string was removed. The patient's bladder was emptied. She was awoken in the operating room in stable condition without complication. DISPOSITION:  The patient will ultimately require either a ureteroscopy or stent in the clinic.       MD SAM [email or web address]  D:  2022 14:34  T:  2022 0:30  JOB #: [id number]

## 2022-09-14 ENCOUNTER — TRANSCRIBE ORDER (OUTPATIENT)
Dept: SCHEDULING | Age: 32
End: 2022-09-14

## 2022-09-14 DIAGNOSIS — N20.1 CALCULUS OF URETER: ICD-10-CM

## 2022-09-14 DIAGNOSIS — N13.30 HYDRONEPHROSIS: Primary | ICD-10-CM

## 2022-09-19 ENCOUNTER — TRANSCRIBE ORDER (OUTPATIENT)
Dept: SCHEDULING | Age: 32
End: 2022-09-19

## 2022-09-19 DIAGNOSIS — N13.30 HYDRONEPHROSIS, RIGHT: Primary | ICD-10-CM

## 2022-09-19 DIAGNOSIS — N20.1 URETERAL CALCULUS: Primary | ICD-10-CM

## 2022-09-23 ENCOUNTER — HOSPITAL ENCOUNTER (OUTPATIENT)
Dept: NUCLEAR MEDICINE | Age: 32
Discharge: HOME | End: 2022-09-23
Attending: STUDENT IN AN ORGANIZED HEALTH CARE EDUCATION/TRAINING PROGRAM
Payer: MEDICAID

## 2022-09-23 ENCOUNTER — HOSPITAL ENCOUNTER (OUTPATIENT)
Dept: CT IMAGING | Age: 32
Discharge: HOME | End: 2022-09-23
Attending: STUDENT IN AN ORGANIZED HEALTH CARE EDUCATION/TRAINING PROGRAM
Payer: MEDICAID

## 2022-09-23 DIAGNOSIS — N13.30 HYDRONEPHROSIS, RIGHT: ICD-10-CM

## 2022-09-23 DIAGNOSIS — N20.1 URETERAL CALCULUS: ICD-10-CM

## 2022-09-23 PROCEDURE — 74178 CT ABD&PLV WO CNTR FLWD CNTR: CPT

## 2022-09-23 PROCEDURE — 74011250636 HC RX REV CODE- 250/636: Performed by: STUDENT IN AN ORGANIZED HEALTH CARE EDUCATION/TRAINING PROGRAM

## 2022-09-23 PROCEDURE — 74011000636 HC RX REV CODE- 636: Performed by: STUDENT IN AN ORGANIZED HEALTH CARE EDUCATION/TRAINING PROGRAM

## 2022-09-23 PROCEDURE — 78707 K FLOW/FUNCT IMAGE W/O DRUG: CPT

## 2022-09-23 RX ORDER — FUROSEMIDE 10 MG/ML
40 INJECTION INTRAMUSCULAR; INTRAVENOUS ONCE
Status: COMPLETED | OUTPATIENT
Start: 2022-09-23 | End: 2022-09-23

## 2022-09-23 RX ORDER — BETIATIDE 1 MG/1
5.2 INJECTION, POWDER, LYOPHILIZED, FOR SOLUTION INTRAVENOUS
Status: COMPLETED | OUTPATIENT
Start: 2022-09-23 | End: 2022-09-23

## 2022-09-23 RX ADMIN — FUROSEMIDE 40 MG: 10 INJECTION, SOLUTION INTRAMUSCULAR; INTRAVENOUS at 12:19

## 2022-09-23 RX ADMIN — IOPAMIDOL 100 ML: 612 INJECTION, SOLUTION INTRAVENOUS at 11:39

## 2022-09-23 RX ADMIN — BETIATIDE 5.2 MILLICURIE: 1 INJECTION, POWDER, LYOPHILIZED, FOR SOLUTION INTRAVENOUS at 12:08

## 2023-04-21 DIAGNOSIS — N20.1 CALCULUS OF URETER: Primary | ICD-10-CM

## 2023-07-11 NOTE — ED PROVIDER NOTES
[address] [place] [address]   EMERGENCY DEPARTMENT          Date: 7/22/2022  Patient: Stephan Alonzo MRN: [id number]  SSN: [id number]    YOB: 1990  Age: 33 y.o. Sex: female      PCP: Gilda Lopez NP  The patient arrived by private car and is accompanied by spouse. History of Presenting Illness     Chief Complaint   Patient presents with    Flank Pain       History Provided By: Patient and Patient's     HPI: Stephan Alonzo is a 33 y.o. female, pmhx kidney stones, Lyme disease, E. coli bacteremia from renal stones,, who presents ambulatory to the ED c/o right flank pain. Patient has a history of renal stones and was admitted to [facility] for E. coli sepsis with left renal stone in May of this year. She had left hydronephrosis with a stent placed at that time. .  She states that today she had a stent removed that was placed after laser surgery to treat a right-sided stone that was performed on July 18. On the trip home she noted that she had increased pain in the left kidney. She states the pain is intolerable and worse than any  kidney stone she has ever had. She has some mild nausea. No documented fever, she has had some sweats with the pain. Hematuria noted. Past History     Past Medical History:   Diagnosis Date    Nephrolithiasis        No past surgical history on file.     Family History   Problem Relation Age of Onset    Cancer Mother         Cervical Cancer    Heart Disease Father     Diabetes Father     Other Father         Back problems, DDD       Social History     Tobacco Use    Smoking status: Former    Smokeless tobacco: Never       No Known Allergies    Current Facility-Administered Medications   Medication Dose Route Frequency Provider Last Rate Last Admin    sodium chloride (NS) flush 5-10 mL  5-10 mL IntraVENous eRbeca Urrutia MD         Current Outpatient Medications   Medication Sig Dispense Refill    doxycycline (VIBRA-TABS) 100 mg Goal Outcome Evaluation:  [id number]    Orientations: AOx4, intermittently confused   Vitals/Pain: VSS, Pt on RA. Pt c/o LLE pain- PRN given per eMAR  Tele: Afib CVR  Lines/Drains: RUE midline WDL, infusing per eMAR. LUE PIVx1 SL  Skin/Wounds: Right heel, bilateral shins, sacrum and under breast wounds (see WOC note) wound cares completed   GI/: Nails WDL CDI, no BM on shift   LS: Diminished   Labs: Abnormal/Trends, Electrolyte Replacement- Pt on magnesium and phosphorus protocols. Blood sugar checks before meals and at bedtime   Ambulation/Assist: Ax2         tablet Take 1 Tablet by mouth every twelve (12) hours. 24 Tablet 0    ibuprofen (MOTRIN) 600 mg tablet Take 1 Tablet by mouth every six (6) hours as needed for Pain. 20 Tablet 0    ondansetron (ZOFRAN ODT) 4 mg disintegrating tablet Take 1 Tablet by mouth every eight (8) hours as needed for Nausea. 6 Tablet 0         Review of Systems     Review of Systems   All other systems reviewed and are negative. Physical Exam     Physical Exam  Vitals and nursing note reviewed. Constitutional:       General: She is in acute distress. Appearance: She is obese. She is diaphoretic. Comments: Patient screaming in pain walking slowly, bent over the bed, and unable to find position of comfort   HENT:      Head: Normocephalic and atraumatic. Right Ear: External ear normal.      Left Ear: External ear normal.      Mouth/Throat:      Mouth: Mucous membranes are moist.   Eyes:      Extraocular Movements: Extraocular movements intact. Conjunctiva/sclera: Conjunctivae normal.      Pupils: Pupils are equal, round, and reactive to light. Cardiovascular:      Rate and Rhythm: Tachycardia present. Heart sounds: Normal heart sounds. Pulmonary:      Effort: Pulmonary effort is normal. No respiratory distress. Breath sounds: Normal breath sounds. Abdominal:      Palpations: Abdomen is soft. Tenderness: There is abdominal tenderness. There is right CVA tenderness and left CVA tenderness. Musculoskeletal:         General: No signs of injury. Normal range of motion. Cervical back: Normal range of motion and neck supple. Skin:     General: Skin is warm. Capillary Refill: Capillary refill takes less than 2 seconds. Coloration: Skin is not pale. Neurological:      General: No focal deficit present. Mental Status: She is oriented to person, place, and time. Cranial Nerves: No cranial nerve deficit. Motor: No weakness.       Coordination: Coordination normal.      Gait: Gait normal.   Psychiatric:         Mood and Affect: Mood normal.         Behavior: Behavior normal.         Thought Content: Thought content normal.         Judgment: Judgment normal.         Diagnostic Study Results     Labs -     Recent Results (from the past 48 hour(s))   CBC WITH AUTOMATED DIFF    Collection Time: 07/22/22  1:25 AM   Result Value Ref Range    WBC 18.0 (H) 3.6 - 11.0 K/uL    RBC 4.86 3.80 - 5.20 M/uL    HGB 13.7 11.5 - 16.0 g/dL    HCT 41.1 35.0 - 47.0 %    MCV 84.6 80.0 - 99.0 FL    MCH 28.2 26.0 - 34.0 PG    MCHC 33.3 30.0 - 36.5 g/dL    RDW 13.9 11.5 - 14.5 %    PLATELET 871 400 - 386 K/uL    MPV 9.6 8.9 - 12.9 FL    NRBC 0.0 0  WBC    ABSOLUTE NRBC 0.00 0.00 - 0.01 K/uL    NEUTROPHILS 73 32 - 75 %    LYMPHOCYTES 18 12 - 49 %    MONOCYTES 7 5 - 13 %    EOSINOPHILS 1 0 - 7 %    BASOPHILS 0 0 - 1 %    IMMATURE GRANULOCYTES 1 (H) 0.0 - 0.5 %    ABS. NEUTROPHILS 13.2 (H) 1.8 - 8.0 K/UL    ABS. LYMPHOCYTES 3.2 0.8 - 3.5 K/UL    ABS. MONOCYTES 1.3 (H) 0.0 - 1.0 K/UL    ABS. EOSINOPHILS 0.2 0.0 - 0.4 K/UL    ABS. BASOPHILS 0.0 0.0 - 0.1 K/UL    ABS. IMM. GRANS. 0.1 (H) 0.00 - 0.04 K/UL    DF AUTOMATED     METABOLIC PANEL, COMPREHENSIVE    Collection Time: 07/22/22  1:25 AM   Result Value Ref Range    Sodium 140 136 - 145 mmol/L    Potassium 4.1 3.5 - 5.1 mmol/L    Chloride 102 97 - 108 mmol/L    CO2 25 21 - 32 mmol/L    Anion gap 13 5 - 15 mmol/L    Glucose 140 (H) 65 - 100 mg/dL    BUN 14 6 - 20 MG/DL    Creatinine 1.18 (H) 0.55 - 1.02 MG/DL    BUN/Creatinine ratio 12 12 - 20      GFR est AA >60 >60 ml/min/1.73m2    GFR est non-AA 53 (L) >60 ml/min/1.73m2    Calcium 9.7 8.5 - 10.1 MG/DL    Bilirubin, total 0.3 0.2 - 1.0 MG/DL    ALT (SGPT) 51 12 - 78 U/L    AST (SGOT) 25 15 - 37 U/L    Alk.  phosphatase 126 (H) 45 - 117 U/L    Protein, total 8.0 6.4 - 8.2 g/dL    Albumin 3.8 3.5 - 5.0 g/dL    Globulin 4.2 (H) 2.0 - 4.0 g/dL    A-G Ratio 0.9 (L) 1.1 - 2.2     URINALYSIS W/ RFLX MICROSCOPIC Collection Time: 07/22/22  2:20 AM   Result Value Ref Range    Color YELLOW/STRAW      Appearance CLEAR CLEAR      Specific gravity 1.027 1.003 - 1.030      pH (UA) 6.0 5.0 - 8.0      Protein 100 (A) NEG mg/dL    Glucose Negative NEG mg/dL    Ketone Negative NEG mg/dL    Bilirubin Negative NEG      Blood LARGE (A) NEG      Urobilinogen 0.2 0.2 - 1.0 EU/dL    Nitrites Negative NEG      Leukocyte Esterase Negative NEG     HCG URINE, QL    Collection Time: 07/22/22  2:20 AM   Result Value Ref Range    HCG urine, QL Negative NEG     URINE MICROSCOPIC ONLY    Collection Time: 07/22/22  2:20 AM   Result Value Ref Range    WBC 5-10 0 - 4 /hpf    RBC 0-5 0 - 5 /hpf    Epithelial cells MODERATE (A) FEW /lpf    Bacteria Negative NEG /hpf        Radiologic Studies -   CT Results  (Last 48 hours)                 07/22/22 0308  CT ABD PELV WO CONT Final result    Impression:  Obstructing linear 7 mm right ureterovesical junction stone with   upstream hydroureter ureter and hydronephrosis, 3 and 5 mm stones in the   proximal right ureter, and multiple punctate collecting system stones of the   right kidney. Narrative:  EXAM: CT ABD PELV WO CONT       INDICATION: flank pain ? stone       COMPARISON: CT 5/9/2022. IV CONTRAST: None. ORAL CONTRAST: None. TECHNIQUE:    Thin axial images were obtained through the abdomen and pelvis. Coronal and   sagittal reformats were generated. CT dose reduction was achieved through use of   a standardized protocol tailored for this examination and automatic exposure   control for dose modulation. The absence of intravenous contrast material reduces the sensitivity for   evaluation of the vasculature and solid organs. FINDINGS:    LOWER THORAX: No significant abnormality in the incidentally imaged lower chest.   LIVER: Markedly hypodense with sparing about the gallbladder fossa. No focal   lesion otherwise. BILIARY TREE: Gallbladder is within normal limits.  CBD is not dilated. SPLEEN: Unremarkable. PANCREAS: No focal abnormality. ADRENALS: Unremarkable. KIDNEYS/URETERS: Right hydronephrosis and hydroureter to linear 7 mm right   ureterovesical junction stone with  and there are multiple punctate collecting   system stones of the right kidney as well as 3 and 5 mm stones layering   dependently within the dilated proximal right ureter. No left hydronephrosis or   hydroureter or left renal or ureteral stones. STOMACH: Unremarkable. SMALL BOWEL: No dilatation or wall thickening. COLON: No dilatation or wall thickening. APPENDIX: No markable. PERITONEUM: No ascites or pneumoperitoneum. RETROPERITONEUM: No lymphadenopathy or aortic aneurysm. REPRODUCTIVE ORGANS: Uterus and ovaries are unremarkable. URINARY BLADDER: No mass or calculus. BONES: No destructive bone lesion. ABDOMINAL WALL: No mass or hernia. ADDITIONAL COMMENTS: N/A                 CT ABD PELV WO CONT   Final Result   Obstructing linear 7 mm right ureterovesical junction stone with   upstream hydroureter ureter and hydronephrosis, 3 and 5 mm stones in the   proximal right ureter, and multiple punctate collecting system stones of the   right kidney. Procedures     Procedures      Medical Decision Making     Records Reviewed: Nursing Notes, Old Medical Records, Previous Radiology Studies, and Previous Laboratory Studies    Vital Signs  Patient Vitals for the past 24 hrs:   Temp Pulse Resp BP SpO2   07/22/22 0056 98.2 °F (36.8 °C) (!) 108 22 133/88 99 %       Pulse Oximetry Analysis - 99% on RA      I am the first provider for this patient.     I reviewed the vital signs, available nursing notes, past medical history, past surgical history, family history and social history, performed a physical exam and reviewed labs and xrays from this visit    MDM  Number of Diagnoses or Management Options  Hydronephrosis concurrent with and due to calculi of kidney and ureter: established, worsening  Intractable pain: established, worsening     Amount and/or Complexity of Data Reviewed  Clinical lab tests: ordered and reviewed  Tests in the radiology section of CPT®: ordered and reviewed  Tests in the medicine section of CPT®: ordered and reviewed  Obtain history from someone other than the patient: yes ()  Review and summarize past medical records: yes  Discuss the patient with other providers: yes (Dr David Corbett)    Risk of Complications, Morbidity, and/or Mortality  Presenting problems: high  Diagnostic procedures: high  Management options: moderate  General comments: Differential includes renal stone, infarction, UTI, musculoskeletal pain, aneurysm    Patient Progress  Patient progress: improved        ED Course     Initial assessment performed. The patients presenting problems have been discussed, and they are in agreement with the care plan formulated and outlined with them. I have encouraged them to ask questions as they arise throughout their visit. ED Course as of 07/22/22 0728   Fri Jul 22, 2022   0346 Pain initially was controlled with medication, is starting to return. Patient request more pain medication. WBC is elevated at 18,000, and patient has history of recent E. coli bacteremia from obstructed ureter. CT scan today demonstrates  Obstructing linear 7 mm right ureterovesical junction stone with  upstream hydroureter ureter and hydronephrosis, 3 and 5 mm stones in the  proximal right ureter, and multiple punctate collecting system stones of the  right kidney [PS]   [address] discussed with Lila Kwong who accepts patient in transfer to [facility] for urology consultation not available here. Patient will be given Dilaudid for pain control and Rocephin IV due to history of hydronephrosis, elevated white count and recent history of E. coli bacteremia. [PS]   [id number] Patient stable hemodynamically, pain started to come back, will treat as needed.   Awaiting transfer crew. [PS]      ED Course User Index  [PS] Jose Guadalupe Steele MD        Orders Placed This Encounter    CT ABD PELV WO CONT    CBC WITH AUTOMATED DIFF    CMP    URINALYSIS W/ RFLX MICROSCOPIC    LAB PREGNANCY TEST    URINE MICROSCOPIC ONLY    SALINE LOCK IV ONE TIME STAT    sodium chloride (NS) flush 5-10 mL    ondansetron (ZOFRAN) injection 4 mg    ketorolac (TORADOL) injection 15 mg    HYDROmorphone (DILAUDID) syringe 1 mg    HYDROmorphone (DILAUDID) syringe 1 mg    cefTRIAXone (ROCEPHIN) 1 g in 0.9% sodium chloride (MBP/ADV) 50 mL MBP       MEDICATIONS GIVEN:    Medications   sodium chloride (NS) flush 5-10 mL (has no administration in time range)   ondansetron (ZOFRAN) injection 4 mg (4 mg IntraVENous Given 7/22/22 0119)   ketorolac (TORADOL) injection 15 mg (15 mg IntraVENous Given 7/22/22 0119)   HYDROmorphone (DILAUDID) syringe 1 mg (1 mg IntraVENous Given 7/22/22 0119)   HYDROmorphone (DILAUDID) syringe 1 mg (1 mg IntraVENous Given 7/22/22 0348)   cefTRIAXone (ROCEPHIN) 1 g in 0.9% sodium chloride (MBP/ADV) 50 mL MBP (1 g IntraVENous New Bag 7/22/22 0411)         Diagnosis     Clinical Impression:   1. Hydronephrosis concurrent with and due to calculi of kidney and ureter    2. Intractable pain            Disposition     Transfer Note:    I have reviewed all pertinent and currently available diagnostic test results for this visit including, but not limited to, labs, xrays, and EKGs. I have reviewed all pertinent and currently available medical records, past medical history, social history and family history. My plan of care and further evaluation and/or disposition is based on these results, as well as the initial, and subsequent, history and physical exam, as well as any additional complaints during the visit. Laboratory tests, medications, x-rays, diagnosis, and need for admission or transfer have been reviewed and discussed with the patient and/or family.  Pt and/or family have had the opportunity to ask questions about their care. Patient and/or family verbalized understanding and agreement with care plan. After review of patient's ED evaluation I feel patient will benefit from transfer to another hospital for a higher level of care due to need for urology consult not available here    Please note that this dictation was completed with [employer], the computer voice recognition software. Quite often unanticipated grammatical, syntax, homophones, and other interpretive errors are inadvertently transcribed by the computer software. Please disregard these errors. Please excuse any errors that have escaped final proofreading. Rudy Ramesh MD    Patient is being transferred due to the need for specialized care not available at our facility. I feel that the patient is stable for transfer by ambulance. Ambulance transfer is justified by the need for continuous monitoring, IV fluids, and/or oxygen, and potential need for intervention by ambulance personnel en route.

## (undated) DEVICE — CYSTO MRMC: Brand: MEDLINE INDUSTRIES, INC.

## (undated) DEVICE — MARKER,SKIN,WI/RULER AND LABELS: Brand: MEDLINE

## (undated) DEVICE — CANISTER, RIGID, 3000CC: Brand: MEDLINE INDUSTRIES, INC.

## (undated) DEVICE — GOWN,SIRUS,NONRNF,SETINSLV,XL,20/CS: Brand: MEDLINE

## (undated) DEVICE — GLOVE ORANGE PI 7 1/2   MSG9075

## (undated) DEVICE — TUBING, SUCTION, 1/4" X 10', STRAIGHT: Brand: MEDLINE

## (undated) DEVICE — URETERAL STENT
Type: IMPLANTABLE DEVICE | Site: URETER | Status: NON-FUNCTIONAL
Brand: POLARIS™ ULTRA
Removed: 2022-07-22

## (undated) DEVICE — OPEN-END URETERAL CATHETER: Brand: COOK

## (undated) DEVICE — COVER,MAYO STAND,STERILE: Brand: MEDLINE

## (undated) DEVICE — SOLUTION IRRIG 1000ML STRL H2O USP PLAS POUR BTL

## (undated) DEVICE — SOLUTION IRRIG 3000ML 0.9% SOD CHL USP UROMATIC PLAS CONT

## (undated) DEVICE — TUBING IRRIG L77IN DIA0.241IN L BOR FOR CYSTO W/ NVENT

## (undated) DEVICE — SOLUTION IRRIGATION H2O 0797305] ICU MEDICAL INC]

## (undated) DEVICE — GDWIRE UROL STR 150CM FLX TP -- BX/5 SENSOR